# Patient Record
Sex: FEMALE | Race: WHITE | NOT HISPANIC OR LATINO | Employment: FULL TIME | URBAN - METROPOLITAN AREA
[De-identification: names, ages, dates, MRNs, and addresses within clinical notes are randomized per-mention and may not be internally consistent; named-entity substitution may affect disease eponyms.]

---

## 2017-02-22 ENCOUNTER — TELEPHONE (OUTPATIENT)
Dept: FAMILY MEDICINE | Facility: OTHER | Age: 47
End: 2017-02-22

## 2017-02-22 NOTE — TELEPHONE ENCOUNTER
Reason for call:  Medication  Reason for Call:  Medication or medication refill:    Do you use a [facility]?  Name of the pharmacy and phone number for the current request:  [facility] .[phone]    Name of the medication requested: change in medication for hypertension due to side effect of dry mouth    Other request: states has discussed this with Dr Paz in the past    Can we leave a detailed message on this number? YES    Phone number patient can be reached at: Cell number on file:    Telephone Information:   Mobile [phone]       Best Time: aware Dr Paz out until Friday 2/24    Call taken on 2/22/2017 at 2:54 PM by Jailyn Valencia

## 2017-02-22 NOTE — TELEPHONE ENCOUNTER
Spoke to pt to get clarification of message below, pt states that current BP med, chlorthalidone, is making her mouth dry, wondering if her BP medication can be changed to something else. Says she does not have any saliva and she is exercising and still no saliva. Pt states she does not want to come to clinic if required for a visit as she does not want to get sick. Will route to TC for review. Please advise.

## 2017-02-28 ENCOUNTER — VIRTUAL VISIT (OUTPATIENT)
Dept: FAMILY MEDICINE | Facility: OTHER | Age: 47
End: 2017-02-28
Payer: COMMERCIAL

## 2017-02-28 DIAGNOSIS — I10 HYPERTENSION GOAL BP (BLOOD PRESSURE) < 140/90: Primary | ICD-10-CM

## 2017-02-28 PROCEDURE — 99441 ZZC PHYSICIAN TELEPHONE EVALUATION 5-10 MIN: CPT | Performed by: FAMILY MEDICINE

## 2017-02-28 RX ORDER — METOPROLOL SUCCINATE 25 MG/1
25 TABLET, EXTENDED RELEASE ORAL DAILY
Qty: 90 TABLET | Refills: 3 | Status: SHIPPED | OUTPATIENT
Start: 2017-02-28 | End: 2017-04-25

## 2017-02-28 ASSESSMENT — ANXIETY QUESTIONNAIRES
7. FEELING AFRAID AS IF SOMETHING AWFUL MIGHT HAPPEN: NOT AT ALL
2. NOT BEING ABLE TO STOP OR CONTROL WORRYING: NOT AT ALL
5. BEING SO RESTLESS THAT IT IS HARD TO SIT STILL: NOT AT ALL
GAD7 TOTAL SCORE: 0
6. BECOMING EASILY ANNOYED OR IRRITABLE: NOT AT ALL
3. WORRYING TOO MUCH ABOUT DIFFERENT THINGS: NOT AT ALL
1. FEELING NERVOUS, ANXIOUS, OR ON EDGE: NOT AT ALL

## 2017-02-28 ASSESSMENT — PATIENT HEALTH QUESTIONNAIRE - PHQ9: 5. POOR APPETITE OR OVEREATING: NOT AT ALL

## 2017-02-28 NOTE — PROGRESS NOTES
"Hannah Krishna is a 47 year old female who is being evaluated via a telephone visit.      The patient has been notified of following:     \"This telephone visit will be conducted via a call between you and your physician/provider. We have found that certain health care needs can be provided without the need for a physical exam.  This service lets us provide the care you need with a short phone conversation.  If a prescription is necessary we can send it directly to your pharmacy.  If lab work is needed we can place an order for that and you can then stop by our lab to have the test done at a later time.    We will bill your insurance company for this service.  Please check with your medical insurance if this type of visit is covered. You may be responsible for the cost of this type of visit if insurance coverage is denied.  The typical cost is $30 (10min), $59 (11-20min) and $85 (21-30min).  Most often these visits are shorter than 10 minutes.    If during the course of the call the physician/provider feels a telephone visit is not appropriate, you will not be charged for this service.\"       Consent has been obtained for this service by 2 care team members: yes. See the scanned image in the medical record.    Hannah Krishna complains of  Hypertension      I have reviewed and updated the patient's Past Medical History, Social History, Family History and Medication List.    ALLERGIES  No known drug allergies    Kim Juan CMA   (MA signature)    Additional provider notes: Patient wants to change BP medications secondary to dry mouth.  Can't keep saliva in mouth, especially when working out.  Had been on metoprolol in the past, had stopped it as her BP was doing well, but then her BP increased off the medication.    Assessment/Plan:  1. Hypertension goal BP (blood pressure) < 140/90  Stop the chlorthalidone, restart metoprolol.  Have BP checked in pharmacy in the next 1-2 weeks.    - metoprolol (TOPROL-XL) 25 MG 24 " hr tablet; Take 1 tablet (25 mg) by mouth daily  Dispense: 90 tablet; Refill: 3     I have reviewed the note as documented above.  This accurately captures the substance of my conversation with the patient,  Electronically signed by Merlene Paz MD on 2/28/2017      Total time of call between patient and provider was 5 minutes

## 2017-02-28 NOTE — MR AVS SNAPSHOT
After Visit Summary   2/28/2017    Hannah Krishna    MRN: [id number]           Patient Information     Date Of Birth          1970        Visit Information        Provider Department      2/28/2017 11:20 AM Merlene Paz MD [facility]        Today's Diagnoses     Hypertension goal BP (blood pressure) < 140/90    -  1       Follow-ups after your visit        Your next 10 appointments already scheduled     Feb 28, 2017 11:20 AM CST   Telephone Visit with Merlene Paz MD   [facility]    [address]  [facility] [place] [address] [phone]           Note: this is not an onsite visit; there is no need to come to the facility.              Who to contact     If you have questions or need follow up information about today's clinic visit or your schedule please contact [facility] directly at [phone].  Normal or non-critical lab and imaging results will be communicated to you by Outernethart, letter or phone within 4 business days after the clinic has received the results. If you do not hear from us within 7 days, please contact the clinic through Outernethart or phone. If you have a critical or abnormal lab result, we will notify you by phone as soon as possible.  Submit refill requests through BioProtect or call your pharmacy and they will forward the refill request to us. Please allow 3 business days for your refill to be completed.          Additional Information About Your Visit        MyChart Information     BioProtect gives you secure access to your electronic health record. If you see a primary care provider, you can also send messages to your care team and make appointments. If you have questions, please call your primary care clinic.  If you do not have a primary care provider, please call [phone] and they will assist you.        Care EveryWhere ID     This is your Care EveryWhere ID. This could be  used by other organizations to access your [place] medical records  [id number]         Blood Pressure from Last 3 Encounters:   11/25/16 126/82   09/06/16 112/70   07/08/16 120/90    Weight from Last 3 Encounters:   11/25/16 230 lb (104.3 kg)   09/06/16 231 lb (104.8 kg)   08/22/16 230 lb (104.3 kg)              Today, you had the following     No orders found for display         Today's Medication Changes          These changes are accurate as of: 2/28/17 10:27 AM.  If you have any questions, ask your nurse or doctor.               Start taking these medicines.        Dose/Directions    metoprolol 25 MG 24 hr tablet   Commonly known as:  TOPROL-XL   Used for:  Hypertension goal BP (blood pressure) < 140/90   Started by:  Merlene Paz MD        Dose:  25 mg   Take 1 tablet (25 mg) by mouth daily   Quantity:  90 tablet   Refills:  3         Stop taking these medicines if you haven't already. Please contact your care team if you have questions.     chlorthalidone 25 MG tablet   Commonly known as:  HYGROTON   Stopped by:  Merlene Paz MD                Where to get your medicines      These medications were sent to [facility] [address]  [address] [address]     Phone:  [phone]     metoprolol 25 MG 24 hr tablet                Primary Care Provider Office Phone # Fax #    Merlene Paz [phone]       [facility] [address]  [facility] [address]        Thank you!     Thank you for choosing [facility]  for your care. Our goal is always to provide you with excellent care. Hearing back from our patients is one way we can continue to improve our services. Please take a few minutes to complete the written survey that you may receive in the mail after your visit with us. Thank you!             Your Updated Medication List - Protect others around you: Learn how to safely use, store and  throw away your medicines at www.disposemymeds.org.          This list is accurate as of: 2/28/17 10:27 AM.  Always use your most recent med list.                   Brand Name Dispense Instructions for use    buPROPion 300 MG 24 hr tablet    WELLBUTRIN XL    90 tablet    Take 1 tablet (300 mg) by mouth every morning       escitalopram 10 MG tablet    LEXAPRO    90 tablet    Take 1 tablet (10 mg) by mouth daily       FISH OIL PO      Reported on 2/28/2017       metoprolol 25 MG 24 hr tablet    TOPROL-XL    90 tablet    Take 1 tablet (25 mg) by mouth daily       order for DME     2 Device    Equipment being ordered: bilateral wrist splints       VITAMIN B-12 CR PO      Reported on 2/28/2017       VITAMIN B-6 PO      Reported on 2/28/2017

## 2017-03-01 ASSESSMENT — PATIENT HEALTH QUESTIONNAIRE - PHQ9: SUM OF ALL RESPONSES TO PHQ QUESTIONS 1-9: 0

## 2017-03-01 ASSESSMENT — ANXIETY QUESTIONNAIRES: GAD7 TOTAL SCORE: 0

## 2017-03-15 ENCOUNTER — ALLIED HEALTH/NURSE VISIT (OUTPATIENT)
Dept: FAMILY MEDICINE | Facility: OTHER | Age: 47
End: 2017-03-15
Payer: COMMERCIAL

## 2017-03-15 VITALS — SYSTOLIC BLOOD PRESSURE: 118 MMHG | DIASTOLIC BLOOD PRESSURE: 70 MMHG | HEART RATE: 60 BPM

## 2017-03-15 DIAGNOSIS — I10 HYPERTENSION GOAL BP (BLOOD PRESSURE) < 140/90: Primary | ICD-10-CM

## 2017-03-15 PROCEDURE — 99207 ZZC NO CHARGE NURSE ONLY: CPT | Performed by: FAMILY MEDICINE

## 2017-03-15 NOTE — PROGRESS NOTES
Hannah Krishna is enrolled/participating in the retail pharmacy Blood Pressure Goals Achievement Program (BPGAP).  Hannah Krishna was evaluated at [facility] on March 15, 2017 at which time her blood pressure was:    BP Readings from Last 3 Encounters:   03/15/17 118/70   11/25/16 126/82   09/06/16 112/70       Hannah Krishna is not experiencing symptoms.    Follow-Up: BP is at goal of < 140/90mmHg (patient 18+ years of age with or without diabetes).     Completed by:  Alannah Haywood, Pharm.D., [facility], [phone]

## 2017-03-15 NOTE — MR AVS SNAPSHOT
After Visit Summary   3/15/2017    Hannah Krishna    MRN: [id number]           Patient Information     Date Of Birth          1970        Visit Information        Provider Department      3/15/2017 3:43 PM Merlene Pza MD [facility]        Today's Diagnoses     Hypertension goal BP (blood pressure) < 140/90    -  1       Follow-ups after your visit        Who to contact     If you have questions or need follow up information about today's clinic visit or your schedule please contact [facility] directly at [phone].  Normal or non-critical lab and imaging results will be communicated to you by Retargetlyhart, letter or phone within 4 business days after the clinic has received the results. If you do not hear from us within 7 days, please contact the clinic through Retargetlyhart or phone. If you have a critical or abnormal lab result, we will notify you by phone as soon as possible.  Submit refill requests through Valeritas or call your pharmacy and they will forward the refill request to us. Please allow 3 business days for your refill to be completed.          Additional Information About Your Visit        MyChart Information     Valeritas gives you secure access to your electronic health record. If you see a primary care provider, you can also send messages to your care team and make appointments. If you have questions, please call your primary care clinic.  If you do not have a primary care provider, please call [phone] and they will assist you.        Care EveryWhere ID     This is your Care EveryWhere ID. This could be used by other organizations to access your [place] medical records  [id number]        Your Vitals Were     Pulse                   60            Blood Pressure from Last 3 Encounters:   03/15/17 118/70   11/25/16 126/82   09/06/16 112/70    Weight from Last 3 Encounters:   11/25/16 230 lb (104.3 kg)   09/06/16 231 lb (104.8 kg)   08/22/16 230  lb (104.3 kg)              Today, you had the following     No orders found for display       Primary Care Provider Office Phone # Fax #    Merlene GANNON MD [place] [phone]       [facility] [address]  [place] [place] [facility] [address]        Thank you!     Thank you for choosing [facility]  for your care. Our goal is always to provide you with excellent care. Hearing back from our patients is one way we can continue to improve our services. Please take a few minutes to complete the written survey that you may receive in the mail after your visit with us. Thank you!             Your Updated Medication List - Protect others around you: Learn how to safely use, store and throw away your medicines at www.disposemymeds.org.          This list is accurate as of: 3/15/17  3:56 PM.  Always use your most recent med list.                   Brand Name Dispense Instructions for use    buPROPion 300 MG 24 hr tablet    WELLBUTRIN XL    90 tablet    Take 1 tablet (300 mg) by mouth every morning       escitalopram 10 MG tablet    LEXAPRO    90 tablet    Take 1 tablet (10 mg) by mouth daily       FISH OIL PO      Reported on 2/28/2017       metoprolol 25 MG 24 hr tablet    TOPROL-XL    90 tablet    Take 1 tablet (25 mg) by mouth daily       order for DME     2 Device    Equipment being ordered: bilateral wrist splints       VITAMIN B-12 CR PO      Reported on 2/28/2017       VITAMIN B-6 PO      Reported on 2/28/2017

## 2017-04-20 NOTE — PROGRESS NOTES
SUBJECTIVE:                                                    Hannah Krishna is a 47 year old female who presents to clinic today for the following health issues:    History of Present Illness   Hypertension:     Outpatient blood pressures:  Are being checked    Blood pressures checked at:  Home    Dietary sodium intake::  No added salt diet    Has lost 20# in the last 5 months, working out at [employer]  Works out 6 days a week.      Started metoprolol after her virtual visit, but couldn't get her target heart rate, so she stopped it after 4 days.  Had BP checked in March off her medication and it was good.      Problem list and histories reviewed & adjusted, as indicated.  Additional history: as documented    Patient Active Problem List   Diagnosis     Obesity     Polycystic ovaries     Endometriosis of uterus     Hyperlipidemia, unspecified     Major depressive disorder, recurrent episode, moderate (H)     Hypertension goal BP (blood pressure) < 140/90     Anxiety     Esophageal reflux     Lateral epicondylitis     Lesion of left ulnar nerve     Complete tear or right rotator cuff     Hair loss     Past Surgical History:   Procedure Laterality Date     ARTHROSCOPY SHOULDER BICEPS TENODESIS REPAIR Right 4/8/2016    Procedure: ARTHROSCOPY SHOULDER BICEPS TENODESIS REPAIR;  Surgeon: Gilbert Cortes DO;  Location: [facility]     ARTHROSCOPY SHOULDER DECOMPRESSION Right 4/8/2016    Procedure: ARTHROSCOPY SHOULDER DECOMPRESSION;  Surgeon: Gilbert Cortes DO;  Location: [facility]     ARTHROSCOPY SHOULDER DISTAL CLAVICLE REPAIR Right 4/8/2016    Procedure: ARTHROSCOPY SHOULDER DISTAL CLAVICLE RESECTION;  Surgeon: Gilbert Cortes DO;  Location: [facility] OR     ARTHROSCOPY SHOULDER, OPEN ROTATOR CUFF REPAIR, COMBINED Right 4/8/2016    Procedure: COMBINED ARTHROSCOPY SHOULDER, OPEN ROTATOR CUFF REPAIR;  Surgeon: Gilbert Cortes DO;  Location: [facility]     BUNIONECTOMY  10/24/2013    Procedure:  "BUNIONECTOMY;;  Surgeon: Ariel Mcdermott DPM;  Location: [facility] OR     C LIGATE FALLOPIAN TUBE,POSTPARTUM  1998    Tubal Ligation     C NONSPECIFIC PROCEDURE  2002    kidney stones removed     C NONSPECIFIC PROCEDURE  060503    lasik surgery     C STRESS ECHO TEST NL  11/2005    neg (low probability of ischemic disease)     EXCISE EXOSTOSIS FOOT  10/24/2013    Procedure: EXCISE EXOSTOSIS FOOT;  left excision of accessory navicular, bunion repair with osteotomy, and navicular remodeling;  Surgeon: Ariel Mcdermott DPM;  Location: [facility] OR     EYE SURGERY       HC RAD RX IODINE 123 SODIUM IODIDE  MICC, UP  MICC  10/2005    I 123 thryoid -WNL     HEAD & NECK SURGERY      Why is this one highlighted on yes already??     OSTEOTOMY FOOT  10/24/2013    Procedure: OSTEOTOMY FOOT;;  Surgeon: Ariel Mcdermott DPM;  Location: [facility] OR       Social History   Substance Use Topics     Smoking status: Former Smoker     Packs/day: 0.10     Years: 18.00     Types: Cigarettes     Quit date: 11/7/2014     Smokeless tobacco: Never Used      Comment: since 2007     Alcohol use Yes      Comment: weekends <2-4 drinks     Family History   Problem Relation Age of Onset     Adopted: Yes     Unknown/Adopted Mother      Unknown/Adopted Father      Unknown/Adopted Maternal Grandmother      Unknown/Adopted Maternal Grandfather      Unknown/Adopted Paternal Grandmother      Unknown/Adopted Paternal Grandfather            ROS:  C: NEGATIVE for fever, chills  E/M: NEGATIVE for ear, mouth and throat problems  R: NEGATIVE for significant cough or SOB  CV: NEGATIVE for chest pain, palpitations or peripheral edema    OBJECTIVE:                                                    /74  Pulse 57  Temp 98.7  F (37.1  C) (Temporal)  Resp 12  Ht 5' 3.19\" (1.605 m)  Wt 212 lb (96.2 kg)  SpO2 99%  BMI 37.33 kg/m2  Body mass index is 37.33 kg/(m^2).  Gen: no apparent distress  Chest: clear to auscultation without wheeze, rale or " "rhonchi  Cor: regular rate and rhythm without murmur  Ext: warm and dry without edema  Psych: Alert and oriented times 3; coherent speech, normal   rate and volume, able to articulate logical thoughts, able   to abstract reason, no tangential thoughts, no hallucinations   or delusions  Her affect is bright     ASSESSMENT/PLAN:                                                      BMI:   Estimated body mass index is 37.33 kg/(m^2) as calculated from the following:    Height as of this encounter: 5' 3.19\" (1.605 m).    Weight as of this encounter: 212 lb (96.2 kg).   Weight management plan: Continue [facility]      1. Non morbid obesity, unspecified obesity type  Her hypertension has resolved with her weight loss, OK to stay off medications.  Congratulated her on weight loss so far, she is motivated to continue.    2. Major depressive disorder, recurrent episode, moderate (H)  Discussed her mood, it is great, discussed decreasing medication, she is not ready for that currently, she is concerned about recurrent symptoms and wants to continue to do well, will continue medication without change.    Merlene Paz MD  [facility]  "

## 2017-04-25 ENCOUNTER — OFFICE VISIT (OUTPATIENT)
Dept: FAMILY MEDICINE | Facility: OTHER | Age: 47
End: 2017-04-25
Payer: COMMERCIAL

## 2017-04-25 VITALS
RESPIRATION RATE: 12 BRPM | TEMPERATURE: 98.7 F | HEART RATE: 57 BPM | HEIGHT: 63 IN | DIASTOLIC BLOOD PRESSURE: 74 MMHG | WEIGHT: 212 LBS | OXYGEN SATURATION: 99 % | BODY MASS INDEX: 37.56 KG/M2 | SYSTOLIC BLOOD PRESSURE: 122 MMHG

## 2017-04-25 DIAGNOSIS — F33.1 MAJOR DEPRESSIVE DISORDER, RECURRENT EPISODE, MODERATE (H): ICD-10-CM

## 2017-04-25 DIAGNOSIS — E66.9 NON MORBID OBESITY, UNSPECIFIED OBESITY TYPE: Primary | ICD-10-CM

## 2017-04-25 PROCEDURE — 99213 OFFICE O/P EST LOW 20 MIN: CPT | Performed by: FAMILY MEDICINE

## 2017-04-25 ASSESSMENT — ANXIETY QUESTIONNAIRES
7. FEELING AFRAID AS IF SOMETHING AWFUL MIGHT HAPPEN: 0 = NOT AT ALL
GAD7 TOTAL SCORE: 1

## 2017-04-25 ASSESSMENT — PAIN SCALES - GENERAL: PAINLEVEL: NO PAIN (0)

## 2017-04-25 NOTE — NURSING NOTE
"Chief Complaint   Patient presents with     Hypertension     Hyperlipidemia     Panel Management     ht       Initial /74  Pulse 57  Temp 98.7  F (37.1  C) (Temporal)  Resp 12  Ht 5' 3.19\" (1.605 m)  Wt 212 lb (96.2 kg)  SpO2 99%  BMI 37.33 kg/m2 Estimated body mass index is 37.33 kg/(m^2) as calculated from the following:    Height as of this encounter: 5' 3.19\" (1.605 m).    Weight as of this encounter: 212 lb (96.2 kg).  Medication Reconciliation: complete  Lucia Abdalla CMA (AAMA)    "

## 2017-04-25 NOTE — MR AVS SNAPSHOT
"              After Visit Summary   4/25/2017    Hannah Krishna    MRN: [id number]           Patient Information     Date Of Birth          1970        Visit Information        Provider Department      4/25/2017 1:00 PM Merlene Paz MD [facility]         Follow-ups after your visit        Who to contact     If you have questions or need follow up information about today's clinic visit or your schedule please contact [facility] directly at [phone].  Normal or non-critical lab and imaging results will be communicated to you by CollegeWikishart, letter or phone within 4 business days after the clinic has received the results. If you do not hear from us within 7 days, please contact the clinic through CollegeWikishart or phone. If you have a critical or abnormal lab result, we will notify you by phone as soon as possible.  Submit refill requests through ShowClix or call your pharmacy and they will forward the refill request to us. Please allow 3 business days for your refill to be completed.          Additional Information About Your Visit        MyChart Information     ShowClix gives you secure access to your electronic health record. If you see a primary care provider, you can also send messages to your care team and make appointments. If you have questions, please call your primary care clinic.  If you do not have a primary care provider, please call [phone] and they will assist you.        Care EveryWhere ID     This is your Care EveryWhere ID. This could be used by other organizations to access your [place] medical records  [id number]        Your Vitals Were     Pulse Temperature Respirations Height Pulse Oximetry BMI (Body Mass Index)    57 98.7  F (37.1  C) (Temporal) 12 5' 3.19\" (1.605 m) 99% 37.33 kg/m2       Blood Pressure from Last 3 Encounters:   04/25/17 122/74   03/15/17 118/70   11/25/16 126/82    Weight from Last 3 Encounters:   04/25/17 212 lb (96.2 kg)   11/25/16 " 230 lb (104.3 kg)   09/06/16 231 lb (104.8 kg)              Today, you had the following     No orders found for display         Today's Medication Changes          These changes are accurate as of: 4/25/17  1:10 PM.  If you have any questions, ask your nurse or doctor.               Stop taking these medicines if you haven't already. Please contact your care team if you have questions.     metoprolol 25 MG 24 hr tablet   Commonly known as:  TOPROL-XL   Stopped by:  Merlene Paz MD                    Primary Care Provider Office Phone # Fax #    Merlene Paz [phone]       [facility] [address]  [facility] [address]        Thank you!     Thank you for choosing [facility]  for your care. Our goal is always to provide you with excellent care. Hearing back from our patients is one way we can continue to improve our services. Please take a few minutes to complete the written survey that you may receive in the mail after your visit with us. Thank you!             Your Updated Medication List - Protect others around you: Learn how to safely use, store and throw away your medicines at www.disposemymeds.org.          This list is accurate as of: 4/25/17  1:10 PM.  Always use your most recent med list.                   Brand Name Dispense Instructions for use    buPROPion 300 MG 24 hr tablet    WELLBUTRIN XL    90 tablet    Take 1 tablet (300 mg) by mouth every morning       escitalopram 10 MG tablet    LEXAPRO    90 tablet    Take 1 tablet (10 mg) by mouth daily       FISH OIL PO      Reported on 4/25/2017       VITAMIN B-12 CR PO      Reported on 4/25/2017       VITAMIN B-6 PO      Reported on 4/25/2017

## 2017-06-29 NOTE — PROGRESS NOTES
[address]  [facility] [address]  [phone]  Dept: [phone]    PRE-OP EVALUATION:  Today's date: 2017    Answers for HPI/ROS submitted by the patient on 2017   CHITRA 7 TOTAL SCORE: 0  If you checked off any problems, how difficult have these problems made it for you to do your work, take care of things at home, or get along with other people?: Not difficult at all  PHQ9 TOTAL SCORE: 0    Hannah Krishna (: 1970) presents for pre-operative evaluation assessment as requested by Dr. Lara.  She requires evaluation and anesthesia risk assessment prior to undergoing surgery/procedure for treatment of Left Rotator Cuff .  Proposed procedure: Left Rotator Cuff    Date of Surgery/ Procedure: 7/10/17  Time of Surgery/ Procedure: 10:00 am  Hospital/Surgical Facility: [facility]  Fax number for surgical facility: [phone]  Primary Physician: Merlene Paz  Type of Anesthesia Anticipated: General    Patient has a Health Care Directive or Living Will:  NO    Preop Questions 2017   1.  Do you have a history of heart attack, stroke, stent, bypass or surgery on an artery in the head, neck, heart or legs? No   2.  Do you ever have any pain or discomfort in your chest? No   3.  Do you have a history of  Heart Failure? No   4.   Are you troubled by shortness of breath when:  walking on a level surface, or up a slight hill, or at night? No   5.  Do you currently have a cold, bronchitis or other respiratory infection? No   6.  Do you have a cough, shortness of breath, or wheezing? No   7.  Do you sometimes get pains in the calves of your legs when you walk? No   8. Do you or anyone in your family have previous history of blood clots? No   9.  Do you or does anyone in your family have a serious bleeding problem such as prolonged bleeding following surgeries or cuts? No   10. Have you ever had problems with anemia or been  told to take iron pills? No   11. Have you had any abnormal blood loss such as black, tarry or bloody stools, or abnormal vaginal bleeding? No   12. Have you ever had a blood transfusion? No   13. Have you or any of your relatives ever had problems with anesthesia? No   14. Do you have sleep apnea, excessive snoring or daytime drowsiness? No   15. Do you have any prosthetic heart valves? No   16. Do you have prosthetic joints? No   17. Is there any chance that you may be pregnant? No       HPI:                                                      Brief HPI related to upcoming procedure: left shoulder pain worsening for many months      See problem list for active medical problems.  Problems all longstanding and stable, except as noted/documented.  See ROS for pertinent symptoms related to these conditions.                                                                                                  .    MEDICAL HISTORY:                                                      Patient Active Problem List    Diagnosis Date Noted     Esophageal reflux 12/11/2013     Priority: Medium     Anxiety 07/31/2013     Priority: Medium     Major depressive disorder, recurrent episode, moderate (H) 12/12/2011     Priority: Medium     Hyperlipidemia, unspecified 10/31/2010     Priority: Medium     Diagnosis updated by automated process. Provider to review and confirm.       Endometriosis of uterus 08/25/2003     Priority: Medium     Polycystic ovaries 04/08/2003     Priority: Medium     Obesity 04/11/2001     Priority: Medium     Problem list name updated by automated process. Provider to review        Past Medical History:   Diagnosis Date     Chest pain, unspecified 11/2005    atypical - stress echo neg     Chronic depressive personality disorder      Headache 7/31/2013     Hypertension      Lateral epicondylitis 7/28/2015     Malaise and fatigue 8/1/2005     Obesity, unspecified      Other malaise and fatigue 8/2005      Polycystic ovaries     per pt     Sleep disturbance, unspecified 10/2005    see sleep study     Unspecified disorder of thyroid 9/2005    thyroid nodule. I-123 WNL.     Past Surgical History:   Procedure Laterality Date     ARTHROSCOPY SHOULDER BICEPS TENODESIS REPAIR Right 4/8/2016    Procedure: ARTHROSCOPY SHOULDER BICEPS TENODESIS REPAIR;  Surgeon: Gilbert Cortes DO;  [facility]     ARTHROSCOPY SHOULDER DECOMPRESSION Right 4/8/2016    Procedure: ARTHROSCOPY SHOULDER DECOMPRESSION;  Surgeon: Gilbert Cortes DO;  [facility]     ARTHROSCOPY SHOULDER DISTAL CLAVICLE REPAIR Right 4/8/2016    Procedure: ARTHROSCOPY SHOULDER DISTAL CLAVICLE RESECTION;  Surgeon: Gilbert Cortes DO;  Location[facility]     ARTHROSCOPY SHOULDER, OPEN ROTATOR CUFF REPAIR, COMBINED Right 4/8/2016    Procedure: COMBINED ARTHROSCOPY SHOULDER, OPEN ROTATOR CUFF REPAIR;  Surgeon: Gilbert Cortes DO;  [facility]     BUNIONECTOMY  10/24/2013    Procedure: BUNIONECTOMY;;  Surgeon: Ariel Mcdermott DPM;  Location: [facility]     C LIGATE FALLOPIAN TUBE,POSTPARTUM  1998    Tubal Ligation     C NONSPECIFIC PROCEDURE  2002    kidney stones removed     C NONSPECIFIC PROCEDURE  060503    lasik surgery     C STRESS ECHO TEST NL  11/2005    neg (low probability of ischemic disease)     EXCISE EXOSTOSIS FOOT  10/24/2013    Procedure: EXCISE EXOSTOSIS FOOT;  left excision of accessory navicular, bunion repair with osteotomy, and navicular remodeling;  Surgeon: Ariel Mcdermott DPM;  Location: [facility]     EYE SURGERY       HC RAD RX IODINE 123 SODIUM IODIDE  MICC, UP  MICC  10/2005    I 123 thryoid -WNL     HEAD & NECK SURGERY      Why is this one highlighted on yes already??     OSTEOTOMY FOOT  10/24/2013    Procedure: OSTEOTOMY FOOT;;  Surgeon: Ariel Mcdermott DPM;  Location: [facility]     Current Outpatient Prescriptions   Medication Sig Dispense Refill     buPROPion (WELLBUTRIN XL) 300 MG  "24 hr tablet Take 1 tablet (300 mg) by mouth every morning 90 tablet 3     escitalopram (LEXAPRO) 10 MG tablet Take 1 tablet (10 mg) by mouth daily 90 tablet 3     OTC products: None, except as noted above    Allergies   Allergen Reactions     No Known Drug Allergies       Latex Allergy: NO    Social History   Substance Use Topics     Smoking status: Former Smoker     Packs/day: 0.10     Years: 18.00     Types: Cigarettes     Quit date: 11/7/2014     Smokeless tobacco: Never Used      Comment: since 2007     Alcohol use Yes      Comment: weekends <2-4 drinks     History   Drug Use No        REVIEW OF SYSTEMS:                                                    C: NEGATIVE for fever, chills, change in weight  I: NEGATIVE for worrisome rashes, moles or lesions  E: NEGATIVE for vision changes or irritation  E/M: NEGATIVE for ear, mouth and throat problems  R: NEGATIVE for significant cough or SOB  CV: NEGATIVE for chest pain, palpitations or peripheral edema  GI: NEGATIVE for nausea, abdominal pain, heartburn, or change in bowel habits  : NEGATIVE for frequency, dysuria, or hematuria  MUSCULOSKELETAL: left shoulder pain  N: NEGATIVE for weakness, dizziness or paresthesias  E: NEGATIVE for temperature intolerance, skin/hair changes  H: NEGATIVE for bleeding problems  P: NEGATIVE for changes in mood or affect    EXAM:                                                    /72 (BP Location: Left arm, Patient Position: Chair, Cuff Size: Adult Regular)  Pulse 68  Temp 97.6  F (36.4  C) (Oral)  Resp 16  Ht 5' 3\" (1.6 m)  Wt 205 lb (93 kg)  SpO2 100%  BMI 36.31 kg/m2    GENERAL APPEARANCE: healthy, alert and no distress     EYES: EOMI, PERRL     HENT: ear canals and TM's normal and nose and mouth without ulcers or lesions     NECK: no adenopathy, no asymmetry, masses, or scars and thyroid normal to palpation     RESP: lungs clear to auscultation - no rales, rhonchi or wheezes     CV: regular rates and rhythm, normal " S1 S2, no S3 or S4 and no murmur, click or rub     ABDOMEN:  soft, nontender, no HSM or masses and bowel sounds normal     MS: decreased range of motion left shoulder     SKIN: no suspicious lesions or rashes     NEURO: Normal strength and tone, sensory exam grossly normal, mentation intact and speech normal     PSYCH: mentation appears normal. and affect normal/bright     LYMPHATICS: No axillary, cervical, or supraclavicular nodes    DIAGNOSTICS:                                                    No labs or EKG required     Recent Labs   Lab Test  07/08/16   0953  08/28/15   0750  06/02/14   0857   HGB  13.5   --   13.7   PLT  352   --   369   NA  138  138   --    POTASSIUM  4.4  4.1   --    CR  0.86  0.98   --         IMPRESSION:                                                    Reason for surgery/procedure: left rotator cuff syndrome    The proposed surgical procedure is considered INTERMEDIATE risk.    REVISED CARDIAC RISK INDEX  The patient has the following serious cardiovascular risks for perioperative complications such as (MI, PE, VFib and 3  AV Block):  No serious cardiac risks  INTERPRETATION: 0 risks: Class I (very low risk - 0.4% complication rate)    The patient has the following additional risks for perioperative complications:  No identified additional risks      ICD-10-CM    1. Preop general physical exam Z01.818    2. Tear of left rotator cuff, unspecified tear extent M75.102        RECOMMENDATIONS:                                                      --Patient is to take all scheduled medications on the day of surgery (she plans to take her pills after she returns home)    APPROVAL GIVEN to proceed with proposed procedure, without further diagnostic evaluation       Signed Electronically by: Merlene Paz MD    Copy of this evaluation report is provided to requesting physician.    [facility] Preop Guidelines

## 2017-07-07 ENCOUNTER — OFFICE VISIT (OUTPATIENT)
Dept: FAMILY MEDICINE | Facility: OTHER | Age: 47
End: 2017-07-07
Payer: COMMERCIAL

## 2017-07-07 VITALS
HEART RATE: 68 BPM | SYSTOLIC BLOOD PRESSURE: 124 MMHG | WEIGHT: 205 LBS | DIASTOLIC BLOOD PRESSURE: 72 MMHG | BODY MASS INDEX: 36.32 KG/M2 | RESPIRATION RATE: 16 BRPM | OXYGEN SATURATION: 100 % | HEIGHT: 63 IN | TEMPERATURE: 97.6 F

## 2017-07-07 DIAGNOSIS — Z01.818 PREOP GENERAL PHYSICAL EXAM: Primary | ICD-10-CM

## 2017-07-07 DIAGNOSIS — M75.102 TEAR OF LEFT ROTATOR CUFF, UNSPECIFIED TEAR EXTENT: ICD-10-CM

## 2017-07-07 PROCEDURE — 99214 OFFICE O/P EST MOD 30 MIN: CPT | Performed by: FAMILY MEDICINE

## 2017-07-07 ASSESSMENT — PATIENT HEALTH QUESTIONNAIRE - PHQ9
10. IF YOU CHECKED OFF ANY PROBLEMS, HOW DIFFICULT HAVE THESE PROBLEMS MADE IT FOR YOU TO DO YOUR WORK, TAKE CARE OF THINGS AT HOME, OR GET ALONG WITH OTHER PEOPLE: NOT DIFFICULT AT ALL
SUM OF ALL RESPONSES TO PHQ QUESTIONS 1-9: 0
SUM OF ALL RESPONSES TO PHQ QUESTIONS 1-9: 0

## 2017-07-07 ASSESSMENT — ANXIETY QUESTIONNAIRES
GAD7 TOTAL SCORE: 0
4. TROUBLE RELAXING: NOT AT ALL
GAD7 TOTAL SCORE: 0
7. FEELING AFRAID AS IF SOMETHING AWFUL MIGHT HAPPEN: NOT AT ALL
2. NOT BEING ABLE TO STOP OR CONTROL WORRYING: NOT AT ALL
1. FEELING NERVOUS, ANXIOUS, OR ON EDGE: NOT AT ALL
5. BEING SO RESTLESS THAT IT IS HARD TO SIT STILL: NOT AT ALL
GAD7 TOTAL SCORE: 0
6. BECOMING EASILY ANNOYED OR IRRITABLE: NOT AT ALL
7. FEELING AFRAID AS IF SOMETHING AWFUL MIGHT HAPPEN: NOT AT ALL
3. WORRYING TOO MUCH ABOUT DIFFERENT THINGS: NOT AT ALL

## 2017-07-07 ASSESSMENT — PAIN SCALES - GENERAL: PAINLEVEL: NO PAIN (0)

## 2017-07-07 NOTE — MR AVS SNAPSHOT
After Visit Summary   7/7/2017    Hannah Krishna    MRN: [id number]           Patient Information     Date Of Birth          1970        Visit Information        Provider Department      7/7/2017 11:00 AM Merlene Paz MD [facility]        Today's Diagnoses     Preop general physical exam    -  1    Tear of left rotator cuff, unspecified tear extent          Care Instructions      Before Your Surgery      Call your surgeon if there is any change in your health. This includes signs of a cold or flu (such as a sore throat, runny nose, cough, rash or fever).    Do not smoke, drink alcohol or take over the counter medicine (unless your surgeon or primary care doctor tells you to) for the 24 hours before and after surgery.    If you take prescribed drugs: Follow your doctor s orders about which medicines to take and which to stop until after surgery.    Eating and drinking prior to surgery: follow the instructions from your surgeon    Take a shower or bath the night before surgery. Use the soap your surgeon gave you to gently clean your skin. If you do not have soap from your surgeon, use your regular soap. Do not shave or scrub the surgery site.  Wear clean pajamas and have clean sheets on your bed.           Follow-ups after your visit        Who to contact     If you have questions or need follow up information about today's clinic visit or your schedule please contact [facility] directly at [phone].  Normal or non-critical lab and imaging results will be communicated to you by MyChart, letter or phone within 4 business days after the clinic has received the results. If you do not hear from us within 7 days, please contact the clinic through [employer] or phone. If you have a critical or abnormal lab result, we will notify you by phone as soon as possible.  Submit refill requests through [employer] or call your pharmacy and they will forward the refill request  "to us. Please allow 3 business days for your refill to be completed.          Additional Information About Your Visit        MyChart Information     [employer] gives you secure access to your electronic health record. If you see a primary care provider, you can also send messages to your care team and make appointments. If you have questions, please call your primary care clinic.  If you do not have a primary care provider, please call [phone] and they will assist you.        Care EveryWhere ID     This is your Care EveryWhere ID. This could be used by other organizations to access your [facility] medical records  [id number]        Your Vitals Were     Pulse Temperature Respirations Height Pulse Oximetry BMI (Body Mass Index)    68 97.6  F (36.4  C) (Oral) 16 5' 3\" (1.6 m) 100% 36.31 kg/m2       Blood Pressure from Last 3 Encounters:   07/07/17 124/72   04/25/17 122/74   03/15/17 118/70    Weight from Last 3 Encounters:   07/07/17 205 lb (93 kg)   04/25/17 212 lb (96.2 kg)   11/25/16 230 lb (104.3 kg)              Today, you had the following     No orders found for display       Primary Care Provider Office Phone # Fax #    Merlene GANNON MD [facility] [phone]       [facility] [address]  [facility] [address]        Equal Access to Services     LONNY FARIAS : Hadii aad ku hadasho Soomaali, waaxda luqadaha, qaybta kaalmada kenrick, gurmeet brown . So [facility] [phone].    ATENCIÓN: Si habla español, tiene a manley disposición servicios gratuitos de asistencia lingüística. Eugenio al [phone].    We comply with applicable federal civil rights laws and [place] laws. We do not discriminate on the basis of race, color, national origin, age, disability sex, sexual orientation or gender identity.            Thank you!     Thank you for choosing [facility]  for your care. Our goal is always to provide you with excellent care. Hearing back from " our patients is one way we can continue to improve our services. Please take a few minutes to complete the written survey that you may receive in the mail after your visit with us. Thank you!             Your Updated Medication List - Protect others around you: Learn how to safely use, store and throw away your medicines at www.disposemymeds.org.          This list is accurate as of: 7/7/17 11:27 AM.  Always use your most recent med list.                   Brand Name Dispense Instructions for use Diagnosis    buPROPion 300 MG 24 hr tablet    WELLBUTRIN XL    90 tablet    Take 1 tablet (300 mg) by mouth every morning    Major depressive disorder, recurrent episode, moderate (H)       escitalopram 10 MG tablet    LEXAPRO    90 tablet    Take 1 tablet (10 mg) by mouth daily    Anxiety, Major depressive disorder, recurrent episode, moderate (H)

## 2017-07-07 NOTE — NURSING NOTE
"Chief Complaint   Patient presents with     Pre-Op Exam     Panel Management     lipid, dap, phq9/humberto, bmp       Initial /72 (BP Location: Left arm, Patient Position: Chair, Cuff Size: Adult Regular)  Pulse 68  Temp 97.6  F (36.4  C) (Oral)  Resp 16  Ht 5' 3\" (1.6 m)  Wt 205 lb (93 kg)  SpO2 100%  BMI 36.31 kg/m2 Estimated body mass index is 36.31 kg/(m^2) as calculated from the following:    Height as of this encounter: 5' 3\" (1.6 m).    Weight as of this encounter: 205 lb (93 kg).  Medication Reconciliation: complete   Migdalia Mccarthy CMA (AAMA)      "

## 2017-07-08 ASSESSMENT — ANXIETY QUESTIONNAIRES: GAD7 TOTAL SCORE: 0

## 2017-07-08 ASSESSMENT — PATIENT HEALTH QUESTIONNAIRE - PHQ9: SUM OF ALL RESPONSES TO PHQ QUESTIONS 1-9: 0

## 2017-12-04 DIAGNOSIS — F33.1 MAJOR DEPRESSIVE DISORDER, RECURRENT EPISODE, MODERATE (H): ICD-10-CM

## 2017-12-06 RX ORDER — BUPROPION HYDROCHLORIDE 300 MG/1
300 TABLET ORAL EVERY MORNING
Qty: 30 TABLET | Refills: 0 | Status: SHIPPED | OUTPATIENT
Start: 2017-12-06 | End: 2017-12-28

## 2017-12-06 NOTE — TELEPHONE ENCOUNTER
Routing refill request to provider for review/approval because:  Patient needs to be seen because:  Pt's last mood f/u was on 4/25/17.    Lucretia Odonnell RN

## 2017-12-28 DIAGNOSIS — F33.1 MAJOR DEPRESSIVE DISORDER, RECURRENT EPISODE, MODERATE (H): ICD-10-CM

## 2017-12-28 DIAGNOSIS — F41.9 ANXIETY: ICD-10-CM

## 2018-01-02 RX ORDER — BUPROPION HYDROCHLORIDE 300 MG/1
TABLET ORAL
Qty: 30 TABLET | Refills: 0 | Status: SHIPPED | OUTPATIENT
Start: 2018-01-02 | End: 2018-02-12

## 2018-01-02 RX ORDER — ESCITALOPRAM OXALATE 10 MG/1
TABLET ORAL
Qty: 30 TABLET | Refills: 0 | Status: SHIPPED | OUTPATIENT
Start: 2018-01-02 | End: 2018-02-09

## 2018-01-02 NOTE — TELEPHONE ENCOUNTER
Requested Prescriptions   Pending Prescriptions Disp Refills     buPROPion (WELLBUTRIN XL) 300 MG 24 hr tablet [Pharmacy Med Name: BUPROPION HCL ER (XL) 300MG TB24] 30 tablet 0     Sig: TAKE ONE TABLET BY MOUTH EVERY MORNING **DUE FOR FOLLOW UP    SSRIs Protocol Passed    12/28/2017 12:56 PM       Passed - PHQ-9 score less than 5 in past 6 months    The PHQ-9 criteria is meant to fail. It requires a PHQ-9 score review  PHQ-9 SCORE 2/28/2017 4/25/2017 7/7/2017   Total Score - - -   Total Score MyChart - 0 0   Total Score 0 - 0          Passed - Medication is Bupropion    If the medication is Bupropion (Wellbutrin), and the patient is taking for smoking cessation; OK to refill.         Passed - Patient is age 18 or older       Passed - No active pregnancy on record       Passed - No positive pregnancy test in last 12 months       Passed - Recent (6 mo) or future visit with authorizing provider's specialty    Patient had office visit in the last 6 months or has a visit in the next 30 days with authorizing provider.  See chart review.             buPROPion (WELLBUTRIN XL) 300 MG 24 hr tablet  Routing refill request to provider for review/approval because:  Jacque given x1 and patient did not follow up, please advise  Patient needs to be seen because:  Due for mood follow up (scheduled)  Due for updated PHQ-9    Next 5 appointments (look out 90 days)     Cj 15, 2018  8:00 AM CST   [facility]    [address] [facility] [address]  [facility] [address]   [phone]                Roseline Arias, RN, BSN

## 2018-01-02 NOTE — TELEPHONE ENCOUNTER
Refill given for additional one month will need ov for further refills due for labs, phq9 and pe.     Thank you  Cindy Thomas CNP

## 2018-01-02 NOTE — TELEPHONE ENCOUNTER
Requested Prescriptions   Pending Prescriptions Disp Refills     escitalopram (LEXAPRO) 10 MG tablet [Pharmacy Med Name: ESCITALOPRAM OXALATE 10MG TABS] 90 tablet 3     Sig: TAKE ONE TABLET BY MOUTH EVERY DAY    SSRIs Protocol Passed    12/28/2017 12:56 PM       Passed - PHQ-9 score less than 5 in past 6 months    The PHQ-9 criteria is meant to fail. It requires a PHQ-9 score review  PHQ-9 SCORE 2/28/2017 4/25/2017 7/7/2017   Total Score - - -   Total Score MyChart - 0 0   Total Score 0 - 0            Passed - Patient is age 18 or older       Passed - No active pregnancy on record       Passed - No positive pregnancy test in last 12 months       Passed - Recent (6 mo) or future visit with authorizing provider's specialty    Patient had office visit in the last 6 months or has a visit in the next 30 days with authorizing provider.  See chart review.             escitalopram (LEXAPRO) 10 MG tablet  Medication is being filled for 1 time refill only due to:  Patient needs to be seen because due for mood follow up with updated PHQ-9.   Next 5 appointments (look out 90 days)     Cj 15, 2018  8:00 AM CST   Office Visit with [facility]ita [facility]ez MD   [facility]    [address] [id number] [phone]                Roseline Arias, RN, BSN

## 2018-01-02 NOTE — TELEPHONE ENCOUNTER
Pt returned call,relayed message below. Pt scheduled OV for pe/labs phq9 for 02/16/17 with [facility]

## 2018-02-08 NOTE — PROGRESS NOTES
SUBJECTIVE:   CC: Hannah Krishna is an 48 year old woman who presents for preventive health visit.     Physical   Annual:     Getting at least 3 servings of Calcium per day::  Yes    Bi-annual eye exam::  Yes    Dental care twice a year::  Yes    Sleep apnea or symptoms of sleep apnea::  None    Diet::  Regular (no restrictions)    Frequency of exercise::  2-3 days/week    Duration of exercise::  15-30 minutes    Taking medications regularly::  Yes    Medication side effects::  None            Today's PHQ-2 Score:   PHQ-2 ( 1999 [employer]) 2/16/2018   Q1: Little interest or pleasure in doing things 0   Q2: Feeling down, depressed or hopeless 0   PHQ-2 Score 0   Q1: Little interest or pleasure in doing things Not at all   Q2: Feeling down, depressed or hopeless Not at all   PHQ-2 Score 0   Answers for HPI/ROS submitted by the patient on 2/16/2018   PHQ-2 Score: 0  If you checked off any problems, how difficult have these problems made it for you to do your work, take care of things at home, or get along with other people?: Not difficult at all  PHQ9 TOTAL SCORE: 1  CHITRA 7 TOTAL SCORE: 0      Abuse: Current or Past(Physical, Sexual or Emotional)- No  Do you feel safe in your environment - Yes    Social History   Substance Use Topics     Smoking status: Former Smoker     Packs/day: 0.10     Years: 18.00     Types: Cigarettes     Quit date: 11/7/2014     Smokeless tobacco: Never Used      Comment: since 2007     Alcohol use Yes      Comment: weekends <2-4 drinks     Alcohol Use 2/16/2018   If you drink alcohol, do you typically have greater than 3 drinks per day OR greater than 7 drinks per week?   No   No flowsheet data found.    Reviewed orders with patient.  Reviewed health maintenance and updated orders accordingly - Yes    Patient under age 50, mutual decision reflected in health maintenance.      Pertinent mammograms are reviewed under the imaging tab.  History of abnormal Pap smear: NO - age 30- 65 PAP every 3  "years recommended    Reviewed and updated as needed this visit by clinical staff  Tobacco  Allergies  Meds  Problems  Med Hx  Surg Hx  Fam Hx  Soc Hx          Reviewed and updated as needed this visit by Provider  Allergies  Meds  Problems            Review of Systems   Constitutional: Negative for chills and fever.   HENT: Negative for congestion, ear pain, hearing loss and sore throat.    Eyes: Negative for pain and visual disturbance.   Respiratory: Negative for cough and shortness of breath.    Cardiovascular: Negative for chest pain, palpitations and peripheral edema.   Gastrointestinal: Negative for abdominal pain, constipation, diarrhea, heartburn, hematochezia and nausea.   Genitourinary: Positive for menstrual problem (Heavy menses with clots). Negative for dysuria, frequency, genital sores, hematuria, pelvic pain, urgency and vaginal discharge.   Musculoskeletal: Positive for arthralgias. Negative for joint swelling and myalgias.   Skin: Negative for rash.   Neurological: Positive for headaches. Negative for dizziness, weakness and paresthesias.   Psychiatric/Behavioral: Negative for mood changes. The patient is not nervous/anxious.           OBJECTIVE:   /78 (BP Location: Right arm, Patient Position: Chair, Cuff Size: Adult Large)  Pulse 70  Temp 98.2  F (36.8  C) (Temporal)  Resp 14  Ht 5' 3\" (1.6 m)  Wt 220 lb (99.8 kg)  SpO2 97%  BMI 38.97 kg/m2  Physical Exam   Constitutional: She is oriented to person, place, and time. She appears well-developed and well-nourished. No distress.   HENT:   Right Ear: Tympanic membrane and external ear normal.   Left Ear: Tympanic membrane and external ear normal.   Nose: Nose normal.   Mouth/Throat: Oropharynx is clear and moist. No oropharyngeal exudate.   Eyes: Conjunctivae are normal. Pupils are equal, round, and reactive to light. Right eye exhibits no discharge. Left eye exhibits no discharge.   Neck: Neck supple. No tracheal deviation " present. No thyromegaly present.   Cardiovascular: Normal rate, regular rhythm, S1 normal, S2 normal, normal heart sounds and normal pulses.  Exam reveals no S3, no S4 and no friction rub.    No murmur heard.  Pulmonary/Chest: Effort normal and breath sounds normal. No respiratory distress. She has no wheezes. She has no rales.   Abdominal: Soft. Bowel sounds are normal. She exhibits no mass. There is no hepatosplenomegaly. There is no tenderness.   Genitourinary: No breast swelling, tenderness or discharge.   Musculoskeletal: Normal range of motion. She exhibits no edema.   Lymphadenopathy:     She has no cervical adenopathy.   Neurological: She is alert and oriented to person, place, and time. She has normal strength and normal reflexes. She exhibits normal muscle tone.   Skin: Skin is warm and dry. No rash noted.   Psychiatric: She has a normal mood and affect. Judgment and thought content normal. Cognition and memory are normal.     ASSESSMENT/PLAN:   1. Encounter for routine adult health examination without abnormal findings    - Lipid panel reflex to direct LDL Fasting    2. Anxiety  Has ran out of her medication for the last week due to financial concerns.  However now she has the money to  her medications.  She noticed is that she is more irritable and her mood is poor when she is off of the medication.  Refills given    - escitalopram (LEXAPRO) 10 MG tablet; Take 1 tablet (10 mg) by mouth daily  Dispense: 90 tablet; Refill: 3    3. Major depressive disorder, recurrent episode, moderate (H)  As above.    - escitalopram (LEXAPRO) 10 MG tablet; Take 1 tablet (10 mg) by mouth daily  Dispense: 90 tablet; Refill: 3  - buPROPion (WELLBUTRIN XL) 300 MG 24 hr tablet; Take 1 tablet (300 mg) by mouth every morning  Dispense: 90 tablet; Refill: 3    4. Hyperlipidemia, unspecified hyperlipidemia type    - Glucose    5. Morbid obesity (H)  Recommended starting to improve her exercise and work on her diet in order  "to achieve weight loss    6. Encounter for screening mammogram for breast cancer    - MA Screening Digital Bilateral; Future    7. Excessive or frequent menstruation  She would like to discuss with gynecology regarding her options for menstrual control    - TSH with free T4 reflex  - CBC with platelets  - OB/GYN REFERRAL    COUNSELING:  Reviewed preventive health counseling, as reflected in patient instructions    BP Screening:   Last 3 BP Readings:    BP Readings from Last 3 Encounters:   02/16/18 124/78   07/07/17 124/72   04/25/17 122/74       The following was recommended to the patient:  Re-screen BP within a year and recommended lifestyle modifications     reports that she quit smoking about 3 years ago. Her smoking use included Cigarettes. She has a 1.80 pack-year smoking history. She has never used smokeless tobacco.    Estimated body mass index is 38.97 kg/(m^2) as calculated from the following:    Height as of this encounter: 5' 3\" (1.6 m).    Weight as of this encounter: 220 lb (99.8 kg).   Weight management plan: Discussed healthy diet and exercise guidelines and patient will follow up in 12 months in clinic to re-evaluate.    Counseling Resources:  ATP IV Guidelines  Pooled Cohorts Equation Calculator  Breast Cancer Risk Calculator  FRAX Risk Assessment  ICSI Preventive Guidelines  Dietary Guidelines for Americans, 2010  USDA's MyPlate  ASA Prophylaxis  Lung CA Screening    Merlene Paz MD  [facility]  "

## 2018-02-09 DIAGNOSIS — F41.9 ANXIETY: ICD-10-CM

## 2018-02-09 DIAGNOSIS — F33.1 MAJOR DEPRESSIVE DISORDER, RECURRENT EPISODE, MODERATE (H): ICD-10-CM

## 2018-02-12 DIAGNOSIS — F33.1 MAJOR DEPRESSIVE DISORDER, RECURRENT EPISODE, MODERATE (H): ICD-10-CM

## 2018-02-12 RX ORDER — BUPROPION HYDROCHLORIDE 300 MG/1
TABLET ORAL
Qty: 30 TABLET | Refills: 0 | Status: SHIPPED | OUTPATIENT
Start: 2018-02-12 | End: 2018-02-16

## 2018-02-12 RX ORDER — ESCITALOPRAM OXALATE 10 MG/1
TABLET ORAL
Qty: 30 TABLET | Refills: 0 | Status: SHIPPED | OUTPATIENT
Start: 2018-02-12 | End: 2018-02-16

## 2018-02-16 ENCOUNTER — OFFICE VISIT (OUTPATIENT)
Dept: FAMILY MEDICINE | Facility: OTHER | Age: 48
End: 2018-02-16
Payer: COMMERCIAL

## 2018-02-16 VITALS
TEMPERATURE: 98.2 F | RESPIRATION RATE: 14 BRPM | SYSTOLIC BLOOD PRESSURE: 124 MMHG | BODY MASS INDEX: 38.98 KG/M2 | DIASTOLIC BLOOD PRESSURE: 78 MMHG | HEART RATE: 70 BPM | WEIGHT: 220 LBS | OXYGEN SATURATION: 97 % | HEIGHT: 63 IN

## 2018-02-16 DIAGNOSIS — F33.1 MAJOR DEPRESSIVE DISORDER, RECURRENT EPISODE, MODERATE (H): ICD-10-CM

## 2018-02-16 DIAGNOSIS — E66.01 MORBID OBESITY (H): ICD-10-CM

## 2018-02-16 DIAGNOSIS — E78.5 HYPERLIPIDEMIA, UNSPECIFIED HYPERLIPIDEMIA TYPE: ICD-10-CM

## 2018-02-16 DIAGNOSIS — F41.9 ANXIETY: ICD-10-CM

## 2018-02-16 DIAGNOSIS — Z00.00 ENCOUNTER FOR ROUTINE ADULT HEALTH EXAMINATION WITHOUT ABNORMAL FINDINGS: Primary | ICD-10-CM

## 2018-02-16 DIAGNOSIS — N92.0 EXCESSIVE OR FREQUENT MENSTRUATION: ICD-10-CM

## 2018-02-16 DIAGNOSIS — Z12.31 ENCOUNTER FOR SCREENING MAMMOGRAM FOR BREAST CANCER: ICD-10-CM

## 2018-02-16 LAB
CHOLEST SERPL-MCNC: 183 MG/DL
ERYTHROCYTE [DISTWIDTH] IN BLOOD BY AUTOMATED COUNT: 13.4 % (ref 10–15)
GLUCOSE SERPL-MCNC: 95 MG/DL (ref 70–99)
HCT VFR BLD AUTO: 36.4 % (ref 35–47)
HDLC SERPL-MCNC: 53 MG/DL
HGB BLD-MCNC: 11.9 G/DL (ref 11.7–15.7)
LDLC SERPL CALC-MCNC: 108 MG/DL
MCH RBC QN AUTO: 29.3 PG (ref 26.5–33)
MCHC RBC AUTO-ENTMCNC: 32.7 G/DL (ref 31.5–36.5)
MCV RBC AUTO: 90 FL (ref 78–100)
NONHDLC SERPL-MCNC: 130 MG/DL
PLATELET # BLD AUTO: 333 10E9/L (ref 150–450)
RBC # BLD AUTO: 4.06 10E12/L (ref 3.8–5.2)
TRIGL SERPL-MCNC: 108 MG/DL
TSH SERPL DL<=0.005 MIU/L-ACNC: 2.67 MU/L (ref 0.4–4)
WBC # BLD AUTO: 5.5 10E9/L (ref 4–11)

## 2018-02-16 PROCEDURE — 36415 COLL VENOUS BLD VENIPUNCTURE: CPT | Performed by: FAMILY MEDICINE

## 2018-02-16 PROCEDURE — 84443 ASSAY THYROID STIM HORMONE: CPT | Performed by: FAMILY MEDICINE

## 2018-02-16 PROCEDURE — 80061 LIPID PANEL: CPT | Performed by: FAMILY MEDICINE

## 2018-02-16 PROCEDURE — 82947 ASSAY GLUCOSE BLOOD QUANT: CPT | Performed by: FAMILY MEDICINE

## 2018-02-16 PROCEDURE — 85027 COMPLETE CBC AUTOMATED: CPT | Performed by: FAMILY MEDICINE

## 2018-02-16 PROCEDURE — 99396 PREV VISIT EST AGE 40-64: CPT | Performed by: FAMILY MEDICINE

## 2018-02-16 RX ORDER — BUPROPION HYDROCHLORIDE 300 MG/1
300 TABLET ORAL EVERY MORNING
Qty: 90 TABLET | Refills: 3 | Status: SHIPPED | OUTPATIENT
Start: 2018-02-16 | End: 2019-03-13

## 2018-02-16 RX ORDER — ESCITALOPRAM OXALATE 10 MG/1
10 TABLET ORAL DAILY
Qty: 90 TABLET | Refills: 3 | Status: SHIPPED | OUTPATIENT
Start: 2018-02-16 | End: 2019-03-13

## 2018-02-16 ASSESSMENT — ANXIETY QUESTIONNAIRES
3. WORRYING TOO MUCH ABOUT DIFFERENT THINGS: NOT AT ALL
4. TROUBLE RELAXING: NOT AT ALL
1. FEELING NERVOUS, ANXIOUS, OR ON EDGE: NOT AT ALL
GAD7 TOTAL SCORE: 0
GAD7 TOTAL SCORE: 0
5. BEING SO RESTLESS THAT IT IS HARD TO SIT STILL: NOT AT ALL
GAD7 TOTAL SCORE: 0
7. FEELING AFRAID AS IF SOMETHING AWFUL MIGHT HAPPEN: NOT AT ALL
2. NOT BEING ABLE TO STOP OR CONTROL WORRYING: NOT AT ALL
7. FEELING AFRAID AS IF SOMETHING AWFUL MIGHT HAPPEN: NOT AT ALL
6. BECOMING EASILY ANNOYED OR IRRITABLE: NOT AT ALL

## 2018-02-16 ASSESSMENT — ENCOUNTER SYMPTOMS
CONSTIPATION: 0
CHILLS: 0
EYE PAIN: 0
JOINT SWELLING: 0
PARESTHESIAS: 0
HEMATOCHEZIA: 0
SHORTNESS OF BREATH: 0
FREQUENCY: 0
PALPITATIONS: 0
SORE THROAT: 0
WEAKNESS: 0
DIARRHEA: 0
NERVOUS/ANXIOUS: 0
FEVER: 0
ARTHRALGIAS: 1
COUGH: 0
ABDOMINAL PAIN: 0
DYSURIA: 0
NAUSEA: 0
DIZZINESS: 0
MYALGIAS: 0
HEMATURIA: 0
HEADACHES: 1
HEARTBURN: 0

## 2018-02-16 ASSESSMENT — PATIENT HEALTH QUESTIONNAIRE - PHQ9
SUM OF ALL RESPONSES TO PHQ QUESTIONS 1-9: 1
10. IF YOU CHECKED OFF ANY PROBLEMS, HOW DIFFICULT HAVE THESE PROBLEMS MADE IT FOR YOU TO DO YOUR WORK, TAKE CARE OF THINGS AT HOME, OR GET ALONG WITH OTHER PEOPLE: NOT DIFFICULT AT ALL
SUM OF ALL RESPONSES TO PHQ QUESTIONS 1-9: 1

## 2018-02-16 ASSESSMENT — PAIN SCALES - GENERAL: PAINLEVEL: NO PAIN (0)

## 2018-02-16 NOTE — MR AVS SNAPSHOT
After Visit Summary   2/16/2018    Hannah Krishna    MRN: [id number]           Patient Information     Date Of Birth          1970        Visit Information        Provider Department      2/16/2018 11:40 AM Merlene Paz MD [facility]        Today's Diagnoses     Encounter for routine adult health examination without abnormal findings    -  1    Anxiety        Major depressive disorder, recurrent episode, moderate (H)        Hyperlipidemia, unspecified hyperlipidemia type        Morbid obesity (H)        Encounter for screening mammogram for breast cancer        Excessive or frequent menstruation          Care Instructions      Preventive Health Recommendations  Female Ages 40 to 49    Yearly exam:     See your health care provider every year in order to  1. Review health changes.   2. Discuss preventive care.    3. Review your medicines if your doctor prescribed any.      Get a Pap test every three years (unless you have an abnormal result and your provider advises testing more often).      If you get Pap tests with HPV test, you only need to test every 5 years, unless you have an abnormal result. You do not need a Pap test if your uterus was removed (hysterectomy) and you have not had cancer.      You should be tested each year for STDs (sexually transmitted diseases), if you're at risk.       Ask your doctor if you should have a mammogram.      Have a colonoscopy (test for colon cancer) if someone in your family has had colon cancer or polyps before age 50.       Have a cholesterol test every 5 years.       Have a diabetes test (fasting glucose) after age 45. If you are at risk for diabetes, you should have this test every 3 years.    Shots: Get a flu shot each year. Get a tetanus shot every 10 years.     Nutrition:     Eat at least 5 servings of fruits and vegetables each day.    Eat whole-grain bread, whole-wheat pasta and brown rice instead of white grains and  rice.    Talk to your provider about Calcium and Vitamin D.     Lifestyle    Exercise at least 150 minutes a week (an average of 30 minutes a day, 5 days a week). This will help you control your weight and prevent disease.    Limit alcohol to one drink per day.    No smoking.     Wear sunscreen to prevent skin cancer.    See your dentist every six months for an exam and cleaning.          Follow-ups after your visit        Additional Services     OB/GYN REFERRAL       Your provider has referred you to:  [facility]: [facility] [phone]   [email or web address]    Please be aware that coverage of these services is subject to the terms and limitations of your health insurance plan.  Call member services at your health plan with any benefit or coverage questions.      Please bring the following with you to your appointment:    (1) Any X-Rays, CTs or MRIs which have been performed.  Contact the facility where they were done to arrange for  prior to your scheduled appointment.   (2) List of current medications   (3) This referral request   (4) Any documents/labs given to you for this referral                  Future tests that were ordered for you today     Open Future Orders        Priority Expected Expires Ordered    MA Screening Digital Bilateral Routine  2/16/2019 2/16/2018            Who to contact     If you have questions or need follow up information about today's clinic visit or your schedule please contact [facility] directly at [phone].  Normal or non-critical lab and imaging results will be communicated to you by MyChart, letter or phone within 4 business days after the clinic has received the results. If you do not hear from us within 7 days, please contact the clinic through MyChart or phone. If you have a critical or abnormal lab result, we will notify you by phone as soon as possible.  Submit refill requests through [employer] or call  "your pharmacy and they will forward the refill request to us. Please allow 3 business days for your refill to be completed.          Additional Information About Your Visit        [employer] Information     [employer] gives you secure access to your electronic health record. If you see a primary care provider, you can also send messages to your care team and make appointments. If you have questions, please call your primary care clinic.  If you do not have a primary care provider, please call [phone] and they will assist you.        Care EveryWhere ID     This is your Care EveryWhere ID. This could be used by other organizations to access your [facility] medical records  [id number]        Your Vitals Were     Pulse Temperature Respirations Height Pulse Oximetry BMI (Body Mass Index)    70 98.2  F (36.8  C) (Temporal) 14 5' 3\" (1.6 m) 97% 38.97 kg/m2       Blood Pressure from Last 3 Encounters:   02/16/18 124/78   07/07/17 124/72   04/25/17 122/74    Weight from Last 3 Encounters:   02/16/18 220 lb (99.8 kg)   07/07/17 205 lb (93 kg)   04/25/17 212 lb (96.2 kg)              We Performed the Following     CBC with platelets     Glucose     Lipid panel reflex to direct LDL Fasting     OB/GYN REFERRAL     TSH with free T4 reflex          Today's Medication Changes          These changes are accurate as of 2/16/18 12:17 PM.  If you have any questions, ask your nurse or doctor.               These medicines have changed or have updated prescriptions.        Dose/Directions    buPROPion 300 MG 24 hr tablet   Commonly known as:  WELLBUTRIN XL   This may have changed:    - how much to take  - how to take this  - when to take this  - additional instructions   Used for:  Major depressive disorder, recurrent episode, moderate (H)   Changed by:  Merlene Paz MD        Dose:  300 mg   Take 1 tablet (300 mg) by mouth every morning   Quantity:  90 tablet   Refills:  3       escitalopram 10 MG tablet   Commonly known as:  " LEXAPRO   This may have changed:  See the new instructions.   Used for:  Anxiety, Major depressive disorder, recurrent episode, moderate (H)   Changed by:  Merlene Paz MD        Dose:  10 mg   Take 1 tablet (10 mg) by mouth daily   Quantity:  90 tablet   Refills:  3            Where to get your medicines      These medications were [place] [facility] [place] [address]  [address] [facility] [address]     Phone:  [phone]     buPROPion 300 MG 24 hr tablet    escitalopram 10 MG tablet                Primary Care Provider Office Phone # Fax #    Merlene Paz [phone]       [address]  [facility] [address]        Equal Access to Services     [facility]: Hadii aad ku hadasho Soomaali, waaxda luqadaha, qaybta kaalmada adeegyada, gurmeet brown . So [facility] [phone].    ATENCIÓN: Si habla español, tiene a manley disposición servicios gratuitos de asistencia lingüística. [facility] [phone].    We comply with applicable federal civil rights laws and [place] laws. We do not discriminate on the basis of race, color, national origin, age, disability, sex, sexual orientation, or gender identity.            Thank you!     Thank you for choosing [facility]  for your care. Our goal is always to provide you with excellent care. Hearing back from our patients is one way we can continue to improve our services. Please take a few minutes to complete the written survey that you may receive in the mail after your visit with us. Thank you!             Your Updated Medication List - Protect others around you: Learn how to safely use, store and throw away your medicines at www.disposemymeds.org.          This list is accurate as of 2/16/18 12:17 PM.  Always use your most recent med list.                   Brand Name Dispense Instructions for use Diagnosis    buPROPion 300 MG 24 hr tablet    WELLBUTRIN XL     90 tablet    Take 1 tablet (300 mg) by mouth every morning    Major depressive disorder, recurrent episode, moderate (H)       escitalopram 10 MG tablet    LEXAPRO    90 tablet    Take 1 tablet (10 mg) by mouth daily    Anxiety, Major depressive disorder, recurrent episode, moderate (H)

## 2018-02-16 NOTE — NURSING NOTE
"Chief Complaint   Patient presents with     Physical     Panel Management     height, flu, lipid, phq9       Initial /78 (BP Location: Right arm, Patient Position: Chair, Cuff Size: Adult Large)  Pulse 70  Temp 98.2  F (36.8  C) (Temporal)  Resp 14  Ht 5' 3\" (1.6 m)  Wt 220 lb (99.8 kg)  SpO2 97%  BMI 38.97 kg/m2 Estimated body mass index is 38.97 kg/(m^2) as calculated from the following:    Height as of this encounter: 5' 3\" (1.6 m).    Weight as of this encounter: 220 lb (99.8 kg).  Medication Reconciliation: complete   Ирина Arita CMA      "

## 2018-02-17 ASSESSMENT — PATIENT HEALTH QUESTIONNAIRE - PHQ9: SUM OF ALL RESPONSES TO PHQ QUESTIONS 1-9: 1

## 2018-02-17 ASSESSMENT — ANXIETY QUESTIONNAIRES: GAD7 TOTAL SCORE: 0

## 2018-03-08 ENCOUNTER — RADIANT APPOINTMENT (OUTPATIENT)
Dept: ULTRASOUND IMAGING | Facility: OTHER | Age: 48
End: 2018-03-08
Attending: OBSTETRICS & GYNECOLOGY
Payer: COMMERCIAL

## 2018-03-08 ENCOUNTER — OFFICE VISIT (OUTPATIENT)
Dept: OBGYN | Facility: OTHER | Age: 48
End: 2018-03-08
Payer: COMMERCIAL

## 2018-03-08 VITALS
DIASTOLIC BLOOD PRESSURE: 84 MMHG | WEIGHT: 219.5 LBS | SYSTOLIC BLOOD PRESSURE: 144 MMHG | HEART RATE: 62 BPM | BODY MASS INDEX: 38.88 KG/M2

## 2018-03-08 DIAGNOSIS — N93.9 ABNORMAL UTERINE BLEEDING (AUB): Primary | ICD-10-CM

## 2018-03-08 DIAGNOSIS — N93.9 ABNORMAL UTERINE BLEEDING (AUB): ICD-10-CM

## 2018-03-08 PROCEDURE — 99214 OFFICE O/P EST MOD 30 MIN: CPT | Performed by: OBSTETRICS & GYNECOLOGY

## 2018-03-08 PROCEDURE — 76856 US EXAM PELVIC COMPLETE: CPT

## 2018-03-08 PROCEDURE — 76830 TRANSVAGINAL US NON-OB: CPT

## 2018-03-08 NOTE — MR AVS SNAPSHOT
After Visit Summary   3/8/2018    Hannah Krishna    MRN: [id number]           Patient Information     Date Of Birth          1970        Visit Information        Provider Department      3/8/2018 1:00 PM Radha Oakley MD [facility]        Today's Diagnoses     Abnormal uterine bleeding (AUB)    -  1       Follow-ups after your visit        Future tests that were ordered for you today     Open Future Orders        Priority Expected Expires Ordered    US Pelvic Complete with Transvaginal Routine  6/6/2018 3/8/2018            Who to contact     If you have questions or need follow up information about today's clinic visit or your schedule please contact [facility] directly at [phone].  Normal or non-critical lab and imaging results will be communicated to you by MyChart, letter or phone within 4 business days after the clinic has received the results. If you do not hear from us within 7 days, please contact the clinic through [employer] or phone. If you have a critical or abnormal lab result, we will notify you by phone as soon as possible.  Submit refill requests through [employer] or call your pharmacy and they will forward the refill request to us. Please allow 3 business days for your refill to be completed.          Additional Information About Your Visit        MyChart Information     [employer] gives you secure access to your electronic health record. If you see a primary care provider, you can also send messages to your care team and make appointments. If you have questions, please call your primary care clinic.  If you do not have a primary care provider, please call [phone] and they will assist you.        Care EveryWhere ID     This is your Care EveryWhere ID. This could be used by other organizations to access your [facility] medical records  [id number]        Your Vitals Were     Pulse Last Period BMI (Body Mass Index)             62 03/03/2018  (Approximate) 38.88 kg/m2          Blood Pressure from Last 3 Encounters:   03/08/18 144/84   02/16/18 124/78   07/07/17 124/72    Weight from Last 3 Encounters:   03/08/18 219 lb 8 oz (99.6 kg)   02/16/18 220 lb (99.8 kg)   07/07/17 205 lb (93 kg)               Primary Care Provider Office Phone # Fax #    Merlene GANNON MD [place] [phone]       [address]  [facility] [place] [facility] [address]        Equal Access to Services     [facility]: Hadii paige wilder hadasho Soomaali, waaxda luqadaha, qaybta kaalmada ademallika, gurmeet brown . So [facility] [phone].    ATENCIÓN: Si habla español, tiene a manley disposición servicios gratuitos de asistencia lingüística. [facility] [phone].    We comply with applicable federal civil rights laws and [place] laws. We do not discriminate on the basis of race, color, national origin, age, disability, sex, sexual orientation, or gender identity.            Thank you!     Thank you for choosing [facility]  for your care. Our goal is always to provide you with excellent care. Hearing back from our patients is one way we can continue to improve our services. Please take a few minutes to complete the written survey that you may receive in the mail after your visit with us. Thank you!             Your Updated Medication List - Protect others around you: Learn how to safely use, store and throw away your medicines at www.disposemymeds.org.          This list is accurate as of 3/8/18  1:23 PM.  Always use your most recent med list.                   Brand Name Dispense Instructions for use Diagnosis    buPROPion 300 MG 24 hr tablet    WELLBUTRIN XL    90 tablet    Take 1 tablet (300 mg) by mouth every morning    Major depressive disorder, recurrent episode, moderate (H)       escitalopram 10 MG tablet    LEXAPRO    90 tablet    Take 1 tablet (10 mg) by mouth daily    Anxiety, Major depressive disorder, recurrent episode, moderate (H)

## 2018-03-08 NOTE — NURSING NOTE
"Chief Complaint   Patient presents with     Abnormal Uterine Bleeding     heavy peirods       Initial /84 (BP Location: Right arm, Patient Position: Chair, Cuff Size: Adult Regular)  Pulse 62  Wt 219 lb 8 oz (99.6 kg)  LMP 2018 (Approximate)  BMI 38.88 kg/m2 Estimated body mass index is 38.88 kg/(m^2) as calculated from the following:    Height as of 18: 5' 3\" (1.6 m).    Weight as of this encounter: 219 lb 8 oz (99.6 kg).  BP completed using cuff size: regular        The following HM Due: mammogram      The following patient reported/Care Every where data was sent to:  P ABSTRACT QUALITY INITIATIVES [[id number]]       orders have been placed    Sunita Campbell CMA  2018           "

## 2018-03-08 NOTE — PROGRESS NOTES
OB/GYN   3/8/2018      NAME:  Hannah Krishna  PCP:  CortneyronMerlene  MRN:  [id number]    Impression / Plan     48 year old  with:      ICD-10-CM    1. Abnormal uterine bleeding (AUB) N93.9 US Pelvic Complete with Transvaginal     Body mass index is 38.88 kg/(m^2).     Discussed possible etiologies for abnormal uterine bleeding.  Plan US.    Consider Mirena, endometrial biopsy with placement.     I will call her with the result and plan to have her come in for follow up and possible IUD placement/endometrial biopsy.         Chief Complaint     Chief Complaint   Patient presents with     Abnormal Uterine Bleeding     heavy peirods       HPI     Hannah Krishna is a  48 year old female who is seen for heavy bleeding    Patient's last menstrual period was 2018 (approximate). Monthly.  5-7 days.  Heavy for 3 days.  Clots.  Changes a tampon every couple of hours or so when it is heavy.  Regular and super tampons.   Midol helps but makes tired.      Patient has had a tubal.    She has a history of htn.      Date of Last Pap Smear:   Lab Results   Component Value Date    PAP NIL 2016         Problem List     Patient Active Problem List    Diagnosis Date Noted     Morbid obesity (H) 2018     Priority: Medium     Anxiety 2013     Priority: Medium     Major depressive disorder, recurrent episode, moderate (H) 2011     Priority: Medium     Hyperlipidemia, unspecified 10/31/2010     Priority: Medium     Endometriosis of uterus 2003     Priority: Medium     Polycystic ovaries 2003     Priority: Medium       Medications     Current Outpatient Prescriptions   Medication     escitalopram (LEXAPRO) 10 MG tablet     buPROPion (WELLBUTRIN XL) 300 MG 24 hr tablet     No current facility-administered medications for this visit.         Allergies     Allergies   Allergen Reactions     No Known Drug Allergies        Past Medical/Surgical History     Past Medical History:    Diagnosis Date     Chest pain, unspecified 11/2005    atypical - stress echo neg     Chronic depressive personality disorder      Esophageal reflux 12/11/2013     Headache 7/31/2013     Hypertension      Lateral epicondylitis 7/28/2015     Malaise and fatigue 8/1/2005     Obesity, unspecified      Other malaise and fatigue 8/2005     Polycystic ovaries     per pt     Sleep disturbance, unspecified 10/2005    see sleep study     Unspecified disorder of thyroid 9/2005    thyroid nodule. I-123 WNL.       Past Surgical History:   Procedure Laterality Date     ARTHROSCOPY SHOULDER BICEPS TENODESIS REPAIR Right 4/8/2016    Procedure: ARTHROSCOPY SHOULDER BICEPS TENODESIS REPAIR;  Surgeon: Gilbert Cortes DO;  [facility]     ARTHROSCOPY SHOULDER DECOMPRESSION Right 4/8/2016    Procedure: ARTHROSCOPY SHOULDER DECOMPRESSION;  Surgeon: Gilbert Cortes DO;  [facility]     ARTHROSCOPY SHOULDER DISTAL CLAVICLE REPAIR Right 4/8/2016    Procedure: ARTHROSCOPY SHOULDER DISTAL CLAVICLE RESECTION;  Surgeon: Gilbert Cortes DO;  [facility]     ARTHROSCOPY SHOULDER, OPEN ROTATOR CUFF REPAIR, COMBINED Right 4/8/2016    Procedure: COMBINED ARTHROSCOPY SHOULDER, OPEN ROTATOR CUFF REPAIR;  Surgeon: Gilbert Cortes DO;  [facility]     BUNIONECTOMY  10/24/2013    Procedure: BUNIONECTOMY;;  Surgeon: Ariel Mcdermott DPM;  Location:  OR     C LIGATE FALLOPIAN TUBE,POSTPARTUM  1998    Tubal Ligation     C NONSPECIFIC PROCEDURE  2002    kidney stones removed     C NONSPECIFIC PROCEDURE  060503    lasik surgery     C STRESS ECHO TEST NL  11/2005    neg (low probability of ischemic disease)     EXCISE EXOSTOSIS FOOT  10/24/2013    Procedure: EXCISE EXOSTOSIS FOOT;  left excision of accessory navicular, bunion repair with osteotomy, and navicular remodeling;  Surgeon: Ariel Mcdermott DPM;  [facility]     EYE SURGERY       HC RAD RX IODINE 123 SODIUM IODIDE  [facility]   [facility] [place]    I 123 thryoid -WNL     HEAD & NECK SURGERY      Why is this one highlighted on yes already??     OSTEOTOMY FOOT  10/24/2013    Procedure: OSTEOTOMY FOOT;;  Surgeon: Ariel Mcdermott DPM;  Location:  OR        Social History     Social History     Social History     Marital status:      Spouse name: N/A     Number of children: 2     Years of education: 12     Occupational History     [employer]     Social History Main Topics     Smoking status: Former Smoker     Packs/day: 0.10     Years: 18.00     Types: Cigarettes     Quit date: 11/7/2014     Smokeless tobacco: Never Used      Comment: since 2007     Alcohol use Yes      Comment: weekends <2-4 drinks     Drug use: No     Sexual activity: Yes     Partners: Male     Birth control/ protection: Surgical      Comment: tubal ligation     Other Topics Concern     Parent/Sibling W/ Cabg, Mi Or Angioplasty Before 65f 55m? No     Social History Narrative    Denies domestic violence issues.       Family History      Family History   Problem Relation Age of Onset     Adopted: Yes     Unknown/Adopted Mother      Unknown/Adopted Father      Unknown/Adopted Maternal Grandmother      Unknown/Adopted Maternal Grandfather      Unknown/Adopted Paternal Grandmother      Unknown/Adopted Paternal Grandfather        ROS     A 10 organ review of systems was asked and the pertinent positives and negatives are listed in the HPI. All other organ systems can be considered negative.     Physical Exam   Vitals: /84 (BP Location: Right arm, Patient Position: Chair, Cuff Size: Adult Regular)  Pulse 62  Wt 219 lb 8 oz (99.6 kg)  LMP 03/03/2018 (Approximate)  BMI 38.88 kg/m2    General: Comfortable, no obvious distress, normal obese body habitus  NPsych: Alert and orientated x 3. Appropriate affect, good insight.   : deferred until next visit.       Labs/Imaging       Labs were reviewed in Epic   TSH   Date Value Ref Range  Status   02/16/2018 2.67 0.40 - 4.00 mU/L Final     Hemoglobin   Date Value Ref Range Status   02/16/2018 11.9 11.7 - 15.7 g/dL Final       Imaging was reviewed in Epic.   No recent US    25 minutes was spent with patient, more than 50% counseling and coordinating care    Radha Oakley MD

## 2018-03-14 DIAGNOSIS — F41.9 ANXIETY: ICD-10-CM

## 2018-03-14 DIAGNOSIS — F33.1 MAJOR DEPRESSIVE DISORDER, RECURRENT EPISODE, MODERATE (H): ICD-10-CM

## 2018-03-14 RX ORDER — ESCITALOPRAM OXALATE 10 MG/1
TABLET ORAL
Qty: 30 TABLET | Refills: 0 | OUTPATIENT
Start: 2018-03-14

## 2018-03-14 NOTE — TELEPHONE ENCOUNTER
escitalopram    Sent 2/16/2018 with 1 year supply. Refill not appropriate at this time.     Yadira Gardiner, RN, BSN

## 2018-04-09 ENCOUNTER — OFFICE VISIT (OUTPATIENT)
Dept: OBGYN | Facility: OTHER | Age: 48
End: 2018-04-09
Payer: COMMERCIAL

## 2018-04-09 VITALS
WEIGHT: 219 LBS | DIASTOLIC BLOOD PRESSURE: 90 MMHG | BODY MASS INDEX: 38.79 KG/M2 | SYSTOLIC BLOOD PRESSURE: 150 MMHG | HEART RATE: 64 BPM

## 2018-04-09 DIAGNOSIS — N93.9 ABNORMAL UTERINE BLEEDING (AUB): ICD-10-CM

## 2018-04-09 DIAGNOSIS — Z30.430 ENCOUNTER FOR IUD INSERTION: Primary | ICD-10-CM

## 2018-04-09 PROBLEM — Z97.5 IUD (INTRAUTERINE DEVICE) IN PLACE: Status: ACTIVE | Noted: 2018-04-09

## 2018-04-09 PROCEDURE — 58300 INSERT INTRAUTERINE DEVICE: CPT | Performed by: OBSTETRICS & GYNECOLOGY

## 2018-04-09 PROCEDURE — 88305 TISSUE EXAM BY PATHOLOGIST: CPT | Performed by: OBSTETRICS & GYNECOLOGY

## 2018-04-09 PROCEDURE — 58100 BIOPSY OF UTERUS LINING: CPT | Performed by: OBSTETRICS & GYNECOLOGY

## 2018-04-09 PROCEDURE — 99201 ZZC OFFICE/OUTPT VISIT, NEW, LEVEL I: CPT | Mod: 25 | Performed by: OBSTETRICS & GYNECOLOGY

## 2018-04-09 NOTE — MR AVS SNAPSHOT
After Visit Summary   4/9/2018    Hannah Krishna    MRN: [id number]           Patient Information     Date Of Birth          1970        Visit Information        Provider Department      4/9/2018 1:30 PM Radha Oakley MD [facility]        Today's Diagnoses     Encounter for IUD insertion    -  1    Abnormal uterine bleeding (AUB)          Care Instructions    You had a Mirena  intrauterine device (IUD) placed today.  You should abstain from intercourse for one week after insertion.   The IUD will be effective for 5 years and then will need removal or replacement.    Call if:     You have bad pain in your lower belly     You have excessive bleeding    You cannot feel the string of the IUD or if the string seems shorter than usual    You think your IUD might have moved or fallen out    You had sex with someone who has or might have an STD, or you think you have an STD    You have foul-smelling discharge    You have an unexplained fever    If you think you might be pregnant                 Follow-ups after your visit        Follow-up notes from your care team     Return in about 3 months (around 7/9/2018), or if symptoms worsen or fail to improve.      Who to contact     If you have questions or need follow up information about today's clinic visit or your schedule please contact [facility] directly at [phone].  Normal or non-critical lab and imaging results will be communicated to you by MyChart, letter or phone within 4 business days after the clinic has received the results. If you do not hear from us within 7 days, please contact the clinic through MyChart or phone. If you have a critical or abnormal lab result, we will notify you by phone as soon as possible.  Submit refill requests through [employer] or call your pharmacy and they will forward the refill request to us. Please allow 3 business days for your refill to be completed.          Additional  Information About Your Visit        [employer] Information     [employer] gives you secure access to your electronic health record. If you see a primary care provider, you can also send messages to your care team and make appointments. If you have questions, please call your primary care clinic.  If you do not have a primary care provider, please call [phone] and they will assist you.        Care EveryWhere ID     This is your Care EveryWhere ID. This could be used by other organizations to access your [facility] medical records  [id number]        Your Vitals Were     Pulse Last Period BMI (Body Mass Index)             64 03/31/2018 38.79 kg/m2          Blood Pressure from Last 3 Encounters:   04/09/18 150/90   03/08/18 144/84   02/16/18 124/78    Weight from Last 3 Encounters:   04/09/18 219 lb (99.3 kg)   03/08/18 219 lb 8 oz (99.6 kg)   02/16/18 220 lb (99.8 kg)              We Performed the Following     ENDOMETRIAL BIOPSY W/O CERVICAL DILATION     HC LEVONORGESTREL IU 52MG 5 YR     INSERTION INTRAUTERINE DEVICE     Surgical pathology exam        Primary Care Provider Office Phone # Fax #    Merlene GANNON MD [place] [phone]       [address]  [place] [facility] [address]        Equal Access to Services     LONNY FARIAS : Hadii aad ku hadasho Soomaali, waaxda luqadaha, qaybta kaalmada adeegyada, gurmeet brown . So [facility] [phone].    ATENCIÓN: Si habla español, tiene a manley disposición servicios gratuitos de asistencia lingüística. Llame al [phone].    We comply with applicable federal civil rights laws and [place] laws. We do not discriminate on the basis of race, color, national origin, age, disability, sex, sexual orientation, or gender identity.            Thank you!     Thank you for choosing [facility]  for your care. Our goal is always to provide you with excellent care. Hearing back from our patients is one way we can continue to improve  our services. Please take a few minutes to complete the written survey that you may receive in the mail after your visit with us. Thank you!             Your Updated Medication List - Protect others around you: Learn how to safely use, store and throw away your medicines at www.disposemymeds.org.          This list is accurate as of 4/9/18  1:54 PM.  Always use your most recent med list.                   Brand Name Dispense Instructions for use Diagnosis    buPROPion 300 MG 24 hr tablet    WELLBUTRIN XL    90 tablet    Take 1 tablet (300 mg) by mouth every morning    Major depressive disorder, recurrent episode, moderate (H)       escitalopram 10 MG tablet    LEXAPRO    90 tablet    Take 1 tablet (10 mg) by mouth daily    Anxiety, Major depressive disorder, recurrent episode, moderate (H)

## 2018-04-09 NOTE — PROGRESS NOTES
OB/GYN  2018      NAME:  Hannah Krishna  PCP:  Merlene Paz  MRN:  [id number]    Impression / Plan     48 year old  with:      ICD-10-CM    1. Encounter for IUD insertion Z30.430 INSERTION INTRAUTERINE DEVICE     HC LEVONORGESTREL IU 52MG 5 YR   2. Abnormal uterine bleeding (AUB) N93.9 INSERTION INTRAUTERINE DEVICE     HC LEVONORGESTREL IU 52MG 5 YR     ENDOMETRIAL BIOPSY W/O CERVICAL DILATION     Surgical pathology exam     Obesity - Body mass index is 38.79 kg/(m^2).       Discussed management options for abnormal uterine bleeding and ultrasound findings. Patient would like to proceed with endometrial biopsy and IUD insertion as scheduled. See below.    Chief Complaint     Chief Complaint   Patient presents with     IUD     placement       HPI     Hannah Krishna is a  48 year old female who is seen for IUD placement.     I last saw the patient 2018 for abnormal uterine bleeding.    US 3/8/18:  Uterus 8.1 x 4.6 x 5 cm.  EMS 3 mm.  Normal US.      Management options were discussed at her last visit. She would like to proceed with IUD placement today for management of abnormal uterine bleeding.      Patient's last menstrual period was 2018.         Problem List     Patient Active Problem List    Diagnosis Date Noted     Morbid obesity (H) 2018     Priority: Medium     Anxiety 2013     Priority: Medium     Major depressive disorder, recurrent episode, moderate (H) 2011     Priority: Medium     Hyperlipidemia, unspecified 10/31/2010     Priority: Medium     Endometriosis of uterus 2003     Priority: Medium     Polycystic ovaries 2003     Priority: Medium       Medications     Current Outpatient Prescriptions   Medication     escitalopram (LEXAPRO) 10 MG tablet     buPROPion (WELLBUTRIN XL) 300 MG 24 hr tablet     No current facility-administered medications for this visit.         Allergies     Allergies   Allergen Reactions     No Known Drug  Allergies        ROS     A /GI organ review of systems was asked and the pertinent positives and negatives are listed in the HPI.     Physical Exam   Vitals: /90 (BP Location: Right arm, Patient Position: Chair, Cuff Size: Adult Regular)  Pulse 64  Wt 219 lb (99.3 kg)  LMP 03/31/2018  BMI 38.79 kg/m2    General: Comfortable, no obvious distress, obese body habitus    Psych: Alert and orientated x 3. Appropriate affect, good insight.   : Normal female external genitalia.  No lesions.  Urethral meatus normal.  Speculum exam reveals a normal vaginal vault, normal cervix.  No abnormal discharge.      Labs/Imaging       Labs were reviewed in Epic   TSH   Date Value Ref Range Status   02/16/2018 2.67 0.40 - 4.00 mU/L Final     Hemoglobin   Date Value Ref Range Status   02/16/2018 11.9 11.7 - 15.7 g/dL Final       Imaging was reviewed in Epic.       10 minutes was spent with patient, more than 50% counseling and coordinating care, exclusive of IUD and endometrial biopsy     Radha Oakley MD          IUD INSERTION    Hannah Krishna is a 48 year old  Women who presents today requesting placement of an Mirena iud.    We discussed risks, benefits, and alternatives including but not limited to:     Possibility of pregnancy and ectopic pregnancy.    Possibility of pelvic inflammatory disease, particularly in the first 20 days and with new partners.    Risk of uterine perforation or IUD expulsion.    Possibility of difficult removal.    Spotting or heavy bleeding.    Cramping, pain or infection during or after insertion.    The patient was given patient information on the IUD and the patient education brochure from the .  She understands the main indication for removal is abnormal bleeding.  The patient has given consent to proceed with placement of the IUD.  She wishes to proceed.  All questions answered.    PROCEDURE:    PROCEDURE: ENDOMETRIAL BIOPSY      After discussing the procedure and obtaining  consent the patient was positioned in lithotomy and the cervix visualized with the speculum. The cervix was cleansed with betadine, allis placed and the pipelle was inserted to  8  cm and small amount of tissue obtained. She tolerated this without difficulty.         Type of IUD: Mirena   After the endometrial biopsy, the IUD is inserted into the uterus under sterile conditions in the usual fashion.    Lot number [id number] and expiration date Oct 2020.  NDC#  20303-595-63 The IUD string is then cut to 3.5 cm.    The patient tolerated this procedure without immediate complication.  The patient is to return or call immediately for any unexplained fever, abdominal or pelvic pain, excessive bleeding, possibility of pregnancy, foul-smelling discharge, sense that the IUD has been expelled.  All questions were answered.  Patient is aware that the IUD will be effective for 5 years and then will need removal or replacement.  Barrier methods for the first week advised.    Return to clinic in 3 months for IUD check as needed    Radha Oakley MD

## 2018-04-09 NOTE — NURSING NOTE
"Chief Complaint   Patient presents with     IUD     placement       Initial /90 (BP Location: Right arm, Patient Position: Chair, Cuff Size: Adult Regular)  Pulse 64  Wt 219 lb (99.3 kg)  LMP 2018  BMI 38.79 kg/m2 Estimated body mass index is 38.79 kg/(m^2) as calculated from the following:    Height as of 18: 5' 3\" (1.6 m).    Weight as of this encounter: 219 lb (99.3 kg).  BP completed using cuff size: regular        The following HM Due: NONE      The following patient reported/Care Every where data was sent to:  P ABSTRACT QUALITY INITIATIVES [90325]       N/a    Sunita Campbell CMA  2018           "

## 2018-04-09 NOTE — PATIENT INSTRUCTIONS
You had a Mirena  intrauterine device (IUD) placed today.  You should abstain from intercourse for one week after insertion.   The IUD will be effective for 5 years and then will need removal or replacement.    Call if:     You have bad pain in your lower belly     You have excessive bleeding    You cannot feel the string of the IUD or if the string seems shorter than usual    You think your IUD might have moved or fallen out    You had sex with someone who has or might have an STD, or you think you have an STD    You have foul-smelling discharge    You have an unexplained fever    If you think you might be pregnant

## 2018-04-11 LAB — COPATH REPORT: NORMAL

## 2018-07-26 NOTE — PROGRESS NOTES
SUBJECTIVE:   Hannah Krishna is a 48 year old female who presents to clinic today for the following health issues:      History of Present Illness     Depression & Anxiety Follow-up:     Depression/Anxiety:  Depression only    Status since last visit::  Stable    Other associated symptoms of depression::  None    Significant life event::  YES    Current substance use::  None    Anxiety/Panic symptoms::  No       Today's PHQ-9         PHQ-9 Total Score:     (P) 0   PHQ-9 Q9 Suicidal ideation:   (P) Not at all   Thoughts of suicide or self harm:      Self-harm Plan:        Self-harm Action:          Safety concerns for self or others:       CHITRA-7 Total Score: (P) 0  Answers for HPI/ROS submitted by the patient on 7/31/2018   If you checked off any problems, how difficult have these problems made it for you to do your work, take care of things at home, or get along with other people?: Not difficult at all  PHQ9 TOTAL SCORE: 0  CHITRA 7 TOTAL SCORE: 0    Concern - lump on head  Onset: several years    Description:   Lump on left side of skull that is growing    Intensity: moderate    Progression of Symptoms:  worsening    Accompanying Signs & Symptoms:  Tenderness to touch    Previous history of similar problem:   yes    Precipitating factors:   Worsened by: none    Alleviating factors:  Improved by: none    Therapies Tried and outcome: none   Problem list and histories reviewed & adjusted, as indicated.  Additional history: as documented    Patient Active Problem List   Diagnosis     Polycystic ovaries     Endometriosis of uterus     Hyperlipidemia, unspecified     Major depressive disorder, recurrent episode, moderate (H)     Anxiety     Morbid obesity (H)     IUD (intrauterine device) in place     Past Surgical History:   Procedure Laterality Date     ARTHROSCOPY SHOULDER BICEPS TENODESIS REPAIR Right 4/8/2016    Procedure: ARTHROSCOPY SHOULDER BICEPS TENODESIS REPAIR;  Surgeon: Gilbert Cortes DO;   [facility]     ARTHROSCOPY SHOULDER DECOMPRESSION Right 4/8/2016    Procedure: ARTHROSCOPY SHOULDER DECOMPRESSION;  Surgeon: Gilbert Cortes DO;  [facility]     ARTHROSCOPY SHOULDER DISTAL CLAVICLE REPAIR Right 4/8/2016    Procedure: ARTHROSCOPY SHOULDER DISTAL CLAVICLE RESECTION;  Surgeon: Gilbert Cortes DO;  [facility]     ARTHROSCOPY SHOULDER, OPEN ROTATOR CUFF REPAIR, COMBINED Right 4/8/2016    Procedure: COMBINED ARTHROSCOPY SHOULDER, OPEN ROTATOR CUFF REPAIR;  Surgeon: Gilbert Cortes DO;  [facility]     BUNIONECTOMY  10/24/2013    Procedure: BUNIONECTOMY;;  Surgeon: Ariel Mcdermott DPM;  [facility]     C LIGATE FALLOPIAN TUBE,POSTPARTUM  1998    Tubal Ligation     C NONSPECIFIC PROCEDURE  2002    kidney stones removed     C NONSPECIFIC PROCEDURE  060503    lasik surgery     C STRESS ECHO TEST NL  11/2005    neg (low probability of ischemic disease)     EXCISE EXOSTOSIS FOOT  10/24/2013    Procedure: EXCISE EXOSTOSIS FOOT;  left excision of accessory navicular, bunion repair with osteotomy, and navicular remodeling;  Surgeon: Ariel Mcdermott DPM;  Location: [facility]     EYE SURGERY       HC RAD RX IODINE 123 SODIUM IODIDE  [facility], UP  [facility]  10/2005    I 123 thryoid -WNL     HEAD & NECK SURGERY      Why is this one highlighted on yes already??     OSTEOTOMY FOOT  10/24/2013    Procedure: OSTEOTOMY FOOT;;  Surgeon: Ariel Mcdermott DPM;  Location: [facility]       Social History   Substance Use Topics     Smoking status: Former Smoker     Packs/day: 0.10     Years: 18.00     Types: Cigarettes     Quit date: 11/7/2014     Smokeless tobacco: Never Used      Comment: since 2007     Alcohol use Yes      Comment: weekends <2-4 drinks     Family History   Problem Relation Age of Onset     Adopted: Yes     Unknown/Adopted Mother      Unknown/Adopted Father      Unknown/Adopted Maternal Grandmother      Unknown/Adopted Maternal Grandfather       Unknown/Adopted Paternal Grandmother      Unknown/Adopted Paternal Grandfather            ROS:  CONSTITUTIONAL: NEGATIVE for fever, chills, change in weight  ENT/MOUTH: NEGATIVE for ear, mouth and throat problems  RESP: NEGATIVE for significant cough or SOB  CV: NEGATIVE for chest pain, palpitations or peripheral edema    OBJECTIVE:     /76 (BP Location: Right arm, Patient Position: Chair, Cuff Size: Adult Large)  Pulse 72  Temp 97.6  F (36.4  C) (Temporal)  Resp 16  Wt 219 lb (99.3 kg)  SpO2 98%  BMI 38.79 kg/m2  Body mass index is 38.79 kg/(m^2).  Gen: no apparent distress  Head:  Top of head there is a 1.5 cm subcutaneous mass consistent with cyst.  No overlying skin disruption or rash.  No tenderness.  Psych: Alert and oriented times 3; coherent speech, normal   rate and volume, able to articulate logical thoughts, able   to abstract reason, no tangential thoughts, no hallucinations   or delusions  Her affect is neutral.    ASSESSMENT/PLAN:     1. Cyst of skin  Suspect pilar cyst.  It is growing and bothering her.  She would like this removed.  Will schedule for removal in the office.      Merlene Paz MD  [facility]

## 2018-07-29 ASSESSMENT — ANXIETY QUESTIONNAIRES
1. FEELING NERVOUS, ANXIOUS, OR ON EDGE: NOT AT ALL
6. BECOMING EASILY ANNOYED OR IRRITABLE: NOT AT ALL
4. TROUBLE RELAXING: NOT AT ALL
7. FEELING AFRAID AS IF SOMETHING AWFUL MIGHT HAPPEN: NOT AT ALL
7. FEELING AFRAID AS IF SOMETHING AWFUL MIGHT HAPPEN: NOT AT ALL
5. BEING SO RESTLESS THAT IT IS HARD TO SIT STILL: NOT AT ALL
2. NOT BEING ABLE TO STOP OR CONTROL WORRYING: NOT AT ALL
GAD7 TOTAL SCORE: 0
3. WORRYING TOO MUCH ABOUT DIFFERENT THINGS: NOT AT ALL
GAD7 TOTAL SCORE: 0

## 2018-07-29 ASSESSMENT — PATIENT HEALTH QUESTIONNAIRE - PHQ9: SUM OF ALL RESPONSES TO PHQ QUESTIONS 1-9: 0

## 2018-07-30 ASSESSMENT — ANXIETY QUESTIONNAIRES
5. BEING SO RESTLESS THAT IT IS HARD TO SIT STILL: NOT AT ALL
3. WORRYING TOO MUCH ABOUT DIFFERENT THINGS: NOT AT ALL
1. FEELING NERVOUS, ANXIOUS, OR ON EDGE: NOT AT ALL
7. FEELING AFRAID AS IF SOMETHING AWFUL MIGHT HAPPEN: NOT AT ALL
GAD7 TOTAL SCORE: 0
6. BECOMING EASILY ANNOYED OR IRRITABLE: NOT AT ALL
2. NOT BEING ABLE TO STOP OR CONTROL WORRYING: NOT AT ALL
4. TROUBLE RELAXING: NOT AT ALL
GAD7 TOTAL SCORE: 0

## 2018-07-30 ASSESSMENT — PATIENT HEALTH QUESTIONNAIRE - PHQ9
SUM OF ALL RESPONSES TO PHQ QUESTIONS 1-9: 0
SUM OF ALL RESPONSES TO PHQ QUESTIONS 1-9: 0

## 2018-07-31 ENCOUNTER — OFFICE VISIT (OUTPATIENT)
Dept: FAMILY MEDICINE | Facility: OTHER | Age: 48
End: 2018-07-31
Payer: COMMERCIAL

## 2018-07-31 VITALS
HEART RATE: 72 BPM | SYSTOLIC BLOOD PRESSURE: 118 MMHG | TEMPERATURE: 97.6 F | DIASTOLIC BLOOD PRESSURE: 76 MMHG | BODY MASS INDEX: 38.79 KG/M2 | RESPIRATION RATE: 16 BRPM | OXYGEN SATURATION: 98 % | WEIGHT: 219 LBS

## 2018-07-31 DIAGNOSIS — L72.9 CYST OF SKIN: Primary | ICD-10-CM

## 2018-07-31 PROCEDURE — 99213 OFFICE O/P EST LOW 20 MIN: CPT | Performed by: FAMILY MEDICINE

## 2018-07-31 ASSESSMENT — ANXIETY QUESTIONNAIRES
3. WORRYING TOO MUCH ABOUT DIFFERENT THINGS: NOT AT ALL
6. BECOMING EASILY ANNOYED OR IRRITABLE: NOT AT ALL
4. TROUBLE RELAXING: NOT AT ALL
7. FEELING AFRAID AS IF SOMETHING AWFUL MIGHT HAPPEN: NOT AT ALL
GAD7 TOTAL SCORE: 0
5. BEING SO RESTLESS THAT IT IS HARD TO SIT STILL: NOT AT ALL
1. FEELING NERVOUS, ANXIOUS, OR ON EDGE: NOT AT ALL
2. NOT BEING ABLE TO STOP OR CONTROL WORRYING: NOT AT ALL
GAD7 TOTAL SCORE: 0

## 2018-07-31 ASSESSMENT — PATIENT HEALTH QUESTIONNAIRE - PHQ9
SUM OF ALL RESPONSES TO PHQ QUESTIONS 1-9: 0
10. IF YOU CHECKED OFF ANY PROBLEMS, HOW DIFFICULT HAVE THESE PROBLEMS MADE IT FOR YOU TO DO YOUR WORK, TAKE CARE OF THINGS AT HOME, OR GET ALONG WITH OTHER PEOPLE: NOT DIFFICULT AT ALL
SUM OF ALL RESPONSES TO PHQ QUESTIONS 1-9: 0

## 2018-07-31 NOTE — MR AVS SNAPSHOT
After Visit Summary   7/31/2018    Hannah Krishna    MRN: [id number]           Patient Information     Date Of Birth          1970        Visit Information        Provider Department      7/31/2018 7:00 AM Merlene Paz MD [facility]        Today's Diagnoses     Cyst of skin    -  1       Follow-ups after your visit        Your next 10 appointments already scheduled     Aug 10, 2018  9:20 AM CDT   Office Visit with Merlene Paz MD   [facility] ([facility])    [address]  [facility] [address]   [phone]           Bring a current list of meds and any records pertaining to this visit. For Physicals, please bring immunization records and any forms needing to be filled out. Please arrive 10 minutes early to complete paperwork.              Who to contact     If you have questions or need follow up information about today's clinic visit or your schedule please contact [facility] directly at [phone].  Normal or non-critical lab and imaging results will be communicated to you by Wave Technology Solutionshart, letter or phone within 4 business days after the clinic has received the results. If you do not hear from us within 7 days, please contact the clinic through QA on Requestt or phone. If you have a critical or abnormal lab result, we will notify you by phone as soon as possible.  Submit refill requests through Sisteer or call your pharmacy and they will forward the refill request to us. Please allow 3 business days for your refill to be completed.          Additional Information About Your Visit        Wave Technology Solutionshart Information     Sisteer gives you secure access to your electronic health record. If you see a primary care provider, you can also send messages to your care team and make appointments. If you have questions, please call your primary care clinic.  If you do not have a primary care provider, please call [phone] and  they will assist you.        Care EveryWhere ID     This is your Care EveryWhere ID. This could be used by other organizations to access your [place] medical records  [id number]        Your Vitals Were     Pulse Temperature Respirations Pulse Oximetry BMI (Body Mass Index)       72 97.6  F (36.4  C) (Temporal) 16 98% 38.79 kg/m2        Blood Pressure from Last 3 Encounters:   07/31/18 118/76   04/09/18 150/90   03/08/18 144/84    Weight from Last 3 Encounters:   07/31/18 219 lb (99.3 kg)   04/09/18 219 lb (99.3 kg)   03/08/18 219 lb 8 oz (99.6 kg)              Today, you had the following     No orders found for display       Primary Care Provider Office Phone # Fax #    Merlene JAGDEEP Paz -[phone]       [address]  [facility] [address]        Equal Access to Services     [facility] : Hadii paige camachoo Socarly, waaxda luqadaha, qaybta kaalmada adecolinyada, gurmeet brown . So [facility] [phone].    ATENCIÓN: Si habla español, tiene a manley disposición servicios gratuitos de asistencia lingüística. Llame al [phone].    We comply with applicable federal civil rights laws and [place] laws. We do not discriminate on the basis of race, color, national origin, age, disability, sex, sexual orientation, or gender identity.            Thank you!     Thank you for choosing [facility]  for your care. Our goal is always to provide you with excellent care. Hearing back from our patients is one way we can continue to improve our services. Please take a few minutes to complete the written survey that you may receive in the mail after your visit with us. Thank you!             Your Updated Medication List - Protect others around you: Learn how to safely use, store and throw away your medicines at www.disposemymeds.org.          This list is accurate as of 7/31/18  7:35 AM.  Always use your most recent med list.                   Brand Name Dispense  Instructions for use Diagnosis    buPROPion 300 MG 24 hr tablet    WELLBUTRIN XL    90 tablet    Take 1 tablet (300 mg) by mouth every morning    Major depressive disorder, recurrent episode, moderate (H)       escitalopram 10 MG tablet    LEXAPRO    90 tablet    Take 1 tablet (10 mg) by mouth daily    Anxiety, Major depressive disorder, recurrent episode, moderate (H)

## 2018-08-07 NOTE — PROGRESS NOTES
SUBJECTIVE:   Hannah Krishna is a 48 year old female who presents to clinic today for the following health issues:      HPI     Patient is here today for a mass removal from the skull.     SUBJECTIVE:  Hannah is a 48 year old female who presents to the clinic today for removal of a mass on the top of her scalp that has been growing over the last year.           The risks, complications, and alternatives to the procedure were  discussed with the patient today.  Questions were answered to her satisfaction.  Patient understands the possibility of infection, bleeding, or a poor cosmetic result.  Consent was signed.                PROCEDURE:         The patient was then placed in a reclined position. The lesion was injected locally with 2 cc's of Lidocaine 1% with epinephrine.  Good anesthesia was achieved.  The lesion prepped in the ususal sterile fashion.  It was then removed by excision using a #15 blade using sterile technique.   The resulting wound was closed with 7 4-0 Ethilon interrupted sutures.  Bacitracin was then applied.         Signs of infection were discussed.  The patient is to keep the wound clean and dry for the next 24-48 hours.  Sutures should be   removed in 10 days.  The specimen was sent to pathology.    Electronically signed by Merlene Paz MD on 8/10/2018     8/21/2018 Addendum:  Cyst about 1.5 cm in diameter, total excision 2 cm in diameter.    Electronically signed by Merlene Paz MD on 8/21/2018

## 2018-08-10 ENCOUNTER — OFFICE VISIT (OUTPATIENT)
Dept: FAMILY MEDICINE | Facility: OTHER | Age: 48
End: 2018-08-10
Payer: COMMERCIAL

## 2018-08-10 VITALS
WEIGHT: 221 LBS | DIASTOLIC BLOOD PRESSURE: 68 MMHG | SYSTOLIC BLOOD PRESSURE: 126 MMHG | RESPIRATION RATE: 16 BRPM | HEART RATE: 71 BPM | BODY MASS INDEX: 39.15 KG/M2 | OXYGEN SATURATION: 99 % | TEMPERATURE: 98.3 F

## 2018-08-10 DIAGNOSIS — L72.9 CYST OF SKIN: Primary | ICD-10-CM

## 2018-08-10 PROCEDURE — 88305 TISSUE EXAM BY PATHOLOGIST: CPT | Mod: TC | Performed by: FAMILY MEDICINE

## 2018-08-10 PROCEDURE — 99207 ZZC DROP WITH A PROCEDURE: CPT | Mod: 25 | Performed by: FAMILY MEDICINE

## 2018-08-10 PROCEDURE — 11422 EXC H-F-NK-SP B9+MARG 1.1-2: CPT | Performed by: FAMILY MEDICINE

## 2018-08-10 NOTE — MR AVS SNAPSHOT
After Visit Summary   8/10/2018    Hannah Krishna    MRN: [id number]           Patient Information     Date Of Birth          1970        Visit Information        Provider Department      8/10/2018 9:20 AM Merlene Paz MD [facility]        Today's Diagnoses     Cyst of skin    -  1       Follow-ups after your visit        Your next 10 appointments already scheduled     Aug 20, 2018  9:30 AM CDT   Nurse Only with NL RN TEAM A, MERVIN   [facility]    [address]  [facility] [id number] [phone]              Who to contact     If you have questions or need follow up information about today's clinic visit or your schedule please contact [facility] directly at [phone].  Normal or non-critical lab and imaging results will be communicated to you by MyChart, letter or phone within 4 business days after the clinic has received the results. If you do not hear from us within 7 days, please contact the clinic through MyChart or phone. If you have a critical or abnormal lab result, we will notify you by phone as soon as possible.  Submit refill requests through Hubkick or call your pharmacy and they will forward the refill request to us. Please allow 3 business days for your refill to be completed.          Additional Information About Your Visit        MyChart Information     Hubkick gives you secure access to your electronic health record. If you see a primary care provider, you can also send messages to your care team and make appointments. If you have questions, please call your primary care clinic.  If you do not have a primary care provider, please call [phone] and they will assist you.        Care EveryWhere ID     This is your Care EveryWhere ID. This could be used by other organizations to access your [place] medical records  [id number]        Your Vitals Were     Pulse Temperature  Respirations Pulse Oximetry BMI (Body Mass Index)       71 98.3  F (36.8  C) 16 99% 39.15 kg/m2        Blood Pressure from Last 3 Encounters:   08/10/18 126/68   07/31/18 118/76   04/09/18 150/90    Weight from Last 3 Encounters:   08/10/18 221 lb (100.2 kg)   07/31/18 219 lb (99.3 kg)   04/09/18 219 lb (99.3 kg)              We Performed the Following     1.1-2CM REMAINDR BODY     Dermatological path order and indications        Primary Care Provider Office Phone # Fax #    Merlene JAGDEEP Paz [phone]       [address]  [facility] [address]        Equal Access to Services     LONNY FARIAS : Beny camachoo Solazaraali, waaxda luqadaha, qaybta kaalmada adeegyada, gurmeet brown . So [facility] [phone].    ATENCIÓN: Si habla español, tiene a manley disposición servicios gratuitos de asistencia lingüística. [facility] [phone].    We comply with applicable federal civil rights laws and [place] laws. We do not discriminate on the basis of race, color, national origin, age, disability, sex, sexual orientation, or gender identity.            Thank you!     Thank you for choosing [facility]  for your care. Our goal is always to provide you with excellent care. Hearing back from our patients is one way we can continue to improve our services. Please take a few minutes to complete the written survey that you may receive in the mail after your visit with us. Thank you!             Your Updated Medication List - Protect others around you: Learn how to safely use, store and throw away your medicines at www.disposemymeds.org.          This list is accurate as of 8/10/18 10:18 AM.  Always use your most recent med list.                   Brand Name Dispense Instructions for use Diagnosis    buPROPion 300 MG 24 hr tablet    WELLBUTRIN XL    90 tablet    Take 1 tablet (300 mg) by mouth every morning    Major depressive disorder, recurrent episode, moderate  (H)       escitalopram 10 MG tablet    LEXAPRO    90 tablet    Take 1 tablet (10 mg) by mouth daily    Anxiety, Major depressive disorder, recurrent episode, moderate (H)

## 2018-08-14 LAB — COPATH REPORT: NORMAL

## 2018-08-20 ENCOUNTER — ALLIED HEALTH/NURSE VISIT (OUTPATIENT)
Dept: FAMILY MEDICINE | Facility: OTHER | Age: 48
End: 2018-08-20
Payer: COMMERCIAL

## 2018-08-20 DIAGNOSIS — Z48.02 ENCOUNTER FOR REMOVAL OF SUTURES: Primary | ICD-10-CM

## 2018-08-20 PROCEDURE — 99207 ZZC NO CHARGE NURSE ONLY: CPT

## 2018-08-20 NOTE — MR AVS SNAPSHOT
After Visit Summary   8/20/2018    Hannah Krishna    MRN: [id number]           Patient Information     Date Of Birth          1970        Visit Information        Provider Department      8/20/2018 9:30 AM NL RN TEAM A, MERVIN [facility]        Today's Diagnoses     Encounter for removal of sutures    -  1       Follow-ups after your visit        Who to contact     If you have questions or need follow up information about today's clinic visit or your schedule please contact [facility] directly at [phone].  Normal or non-critical lab and imaging results will be communicated to you by HundredAppleshart, letter or phone within 4 business days after the clinic has received the results. If you do not hear from us within 7 days, please contact the clinic through Source MDxt or phone. If you have a critical or abnormal lab result, we will notify you by phone as soon as possible.  Submit refill requests through NONO or call your pharmacy and they will forward the refill request to us. Please allow 3 business days for your refill to be completed.          Additional Information About Your Visit        MyChart Information     NONO gives you secure access to your electronic health record. If you see a primary care provider, you can also send messages to your care team and make appointments. If you have questions, please call your primary care clinic.  If you do not have a primary care provider, please call [phone] and they will assist you.        Care EveryWhere ID     This is your Care EveryWhere ID. This could be used by other organizations to access your [place] medical records  [id number]         Blood Pressure from Last 3 Encounters:   08/10/18 126/68   07/31/18 118/76   04/09/18 150/90    Weight from Last 3 Encounters:   08/10/18 221 lb (100.2 kg)   07/31/18 219 lb (99.3 kg)   04/09/18 219 lb (99.3 kg)              Today, you had the following     No orders found for  display       Primary Care Provider Office Phone # Fax #    Merlene GANNON MD [facility] [phone]       [address] [place] [address] [address]  [facility] [address]        Equal Access to Services     LONNY FARIAS : Hadii aad ku hadkaminio Solazaraali, waaxda luqadaha, qaybta kaalmada ademallika, gurmeet rjin hayaapat chappelljesicadarlene vega. So [facility] [phone].    ATENCIÓN: Si habla español, tiene a manley disposición servicios gratuitos de asistencia lingüística. Llame al [phone].    We comply with applicable federal civil rights laws and [place] laws. We do not discriminate on the basis of race, color, national origin, age, disability, sex, sexual orientation, or gender identity.            Thank you!     Thank you for choosing [facility]  for your care. Our goal is always to provide you with excellent care. Hearing back from our patients is one way we can continue to improve our services. Please take a few minutes to complete the written survey that you may receive in the mail after your visit with us. Thank you!             Your Updated Medication List - Protect others around you: Learn how to safely use, store and throw away your medicines at www.disposemymeds.org.          This list is accurate as of 8/20/18  9:33 AM.  Always use your most recent med list.                   Brand Name Dispense Instructions for use Diagnosis    buPROPion 300 MG 24 hr tablet    WELLBUTRIN XL    90 tablet    Take 1 tablet (300 mg) by mouth every morning    Major depressive disorder, recurrent episode, moderate (H)       escitalopram 10 MG tablet    LEXAPRO    90 tablet    Take 1 tablet (10 mg) by mouth daily    Anxiety, Major depressive disorder, recurrent episode, moderate (H)

## 2018-08-20 NOTE — PROGRESS NOTES
Hannah Krishna presents to the clinic for removal of sutures. The patient has had sutures in place for 10 days. There has been no patient reported signs or symptoms of infection or drainage. 7  sutures are seen and located on the head. Tetanus status is up to date. All sutures were easily removed today. Routine wound care discussed by the RN or provider. The patient will follow up as needed.    Joy Ashley, RN, BSN

## 2018-11-27 NOTE — PROGRESS NOTES
SUBJECTIVE:   Hannah Krishna is a 48 year old female who presents to clinic today for the following health issues:    History of Present Illness     Diet:  Regular (no restrictions)  Frequency of exercise:  2-3 days/week  Duration of exercise:  15-30 minutes  Taking medications regularly:  Yes  Medication side effects:  None  Additional concerns today:  No    Concern - mass  Onset: a couple of months    Description:   Mass on top of right foot    Intensity: moderate    Progression of Symptoms:  intermittent    Accompanying Signs & Symptoms:  tenderness    Previous history of similar problem:   none    Precipitating factors:   Worsened by: rubbing on shoe    Alleviating factors:  Improved by: none    Therapies Tried and outcome: none   Problem list and histories reviewed & adjusted, as indicated.  Additional history: as documented    Patient Active Problem List   Diagnosis     Polycystic ovaries     Endometriosis of uterus     Hyperlipidemia, unspecified     Major depressive disorder, recurrent episode, moderate (H)     Anxiety     Morbid obesity (H)     IUD (intrauterine device) in place     Past Surgical History:   Procedure Laterality Date     ARTHROSCOPY SHOULDER BICEPS TENODESIS REPAIR Right 4/8/2016    Procedure: ARTHROSCOPY SHOULDER BICEPS TENODESIS REPAIR;  Surgeon: Gilbert Cortes DO;  Location: [facility]     ARTHROSCOPY SHOULDER DECOMPRESSION Right 4/8/2016    Procedure: ARTHROSCOPY SHOULDER DECOMPRESSION;  Surgeon: Gilbert Cortes DO;  Location: [facility]     ARTHROSCOPY SHOULDER DISTAL CLAVICLE REPAIR Right 4/8/2016    Procedure: ARTHROSCOPY SHOULDER DISTAL CLAVICLE RESECTION;  Surgeon: Gilbert Cortes DO;  Location: [facility]     ARTHROSCOPY SHOULDER, OPEN ROTATOR CUFF REPAIR, COMBINED Right 4/8/2016    Procedure: COMBINED ARTHROSCOPY SHOULDER, OPEN ROTATOR CUFF REPAIR;  Surgeon: Gilbert Cortes DO;  Location: [facility]     BUNIONECTOMY  10/24/2013    Procedure: BUNIONECTOMY;;   Surgeon: Ariel Mcdermott DPM;  Location: [facility] OR     C LIGATE FALLOPIAN TUBE,POSTPARTUM  1998    Tubal Ligation     C NONSPECIFIC PROCEDURE  2002    kidney stones removed     C NONSPECIFIC PROCEDURE  988523    lasik surgery     C STRESS ECHO TEST NL  11/2005    neg (low probability of ischemic disease)     EXCISE EXOSTOSIS FOOT  10/24/2013    Procedure: EXCISE EXOSTOSIS FOOT;  left excision of accessory navicular, bunion repair with osteotomy, and navicular remodeling;  Surgeon: Ariel Mcdermott DPM;  Location: [facility] OR     EYE SURGERY       HC RAD RX IODINE 123 SODIUM IODIDE  UCI, UP  UCI  10/2005    I 123 thryoid -WNL     HEAD & NECK SURGERY      Why is this one highlighted on yes already??     OSTEOTOMY FOOT  10/24/2013    Procedure: OSTEOTOMY FOOT;;  Surgeon: Ariel Mcdermott DPM;  Location:  OR       Social History   Substance Use Topics     Smoking status: Former Smoker     Packs/day: 0.10     Years: 18.00     Types: Cigarettes     Quit date: 11/7/2014     Smokeless tobacco: Never Used      Comment: since 2007     Alcohol use Yes      Comment: weekends <2-4 drinks     Family History   Problem Relation Age of Onset     Adopted: Yes     Unknown/Adopted Mother      Unknown/Adopted Father      Unknown/Adopted Maternal Grandmother      Unknown/Adopted Maternal Grandfather      Unknown/Adopted Paternal Grandmother      Unknown/Adopted Paternal Grandfather            ROS:  CONSTITUTIONAL: NEGATIVE for fever, chills, change in weight  RESP: NEGATIVE for significant cough or shortness of breath   CV: NEGATIVE for chest pain, palpitations or peripheral edema    OBJECTIVE:     /86 (BP Location: Right arm, Patient Position: Chair, Cuff Size: Adult Large)  Pulse 74  Temp 97  F (36.1  C) (Temporal)  Resp 14  Wt 217 lb (98.4 kg)  SpO2 98%  BMI 38.44 kg/m2  Body mass index is 38.44 kg/(m^2).  Gen: no apparent distress  Right foot:  Mid foot there is a 7 mm subcutaneous mass, soft, no  erythema, no tenderness  Psych: Alert and oriented times 3; coherent speech, normal   rate and volume, able to articulate logical thoughts, able   to abstract reason, no tangential thoughts, no hallucinations   or delusions  Her affect is neutral    ASSESSMENT/PLAN:     1. Ganglion cyst of foot  Currently not giving her any problems, states gets discomfort intermittently.  Found that her discomfort improved when she bought shoes that aren't tight on the top of her foot.  Offered Podiatry, she declines.    Merlene Paz MD  [facility]

## 2018-11-30 ENCOUNTER — OFFICE VISIT (OUTPATIENT)
Dept: FAMILY MEDICINE | Facility: OTHER | Age: 48
End: 2018-11-30
Payer: COMMERCIAL

## 2018-11-30 VITALS
HEART RATE: 74 BPM | DIASTOLIC BLOOD PRESSURE: 86 MMHG | BODY MASS INDEX: 38.44 KG/M2 | RESPIRATION RATE: 14 BRPM | TEMPERATURE: 97 F | WEIGHT: 217 LBS | SYSTOLIC BLOOD PRESSURE: 132 MMHG | OXYGEN SATURATION: 98 %

## 2018-11-30 DIAGNOSIS — M67.479 GANGLION CYST OF FOOT: Primary | ICD-10-CM

## 2018-11-30 PROCEDURE — 99213 OFFICE O/P EST LOW 20 MIN: CPT | Performed by: FAMILY MEDICINE

## 2018-11-30 NOTE — MR AVS SNAPSHOT
After Visit Summary   11/30/2018    Hannah Krishna    MRN: [id number]           Patient Information     Date Of Birth          1970        Visit Information        Provider Department      11/30/2018 1:20 PM Merlene Paz MD [facility]        Today's Diagnoses     Ganglion cyst of foot    -  1       Follow-ups after your visit        Who to contact     If you have questions or need follow up information about today's clinic visit or your schedule please contact [facility] directly at [phone].  Normal or non-critical lab and imaging results will be communicated to you by Anthillzhart, letter or phone within 4 business days after the clinic has received the results. If you do not hear from us within 7 days, please contact the clinic through Afoundriat or phone. If you have a critical or abnormal lab result, we will notify you by phone as soon as possible.  Submit refill requests through "[employer] or call your pharmacy and they will forward the refill request to us. Please allow 3 business days for your refill to be completed.          Additional Information About Your Visit        MyChart Information     "[employer] gives you secure access to your electronic health record. If you see a primary care provider, you can also send messages to your care team and make appointments. If you have questions, please call your primary care clinic.  If you do not have a primary care provider, please call [phone] and they will assist you.        Care EveryWhere ID     This is your Care EveryWhere ID. This could be used by other organizations to access your [place] medical records  [id number]        Your Vitals Were     Pulse Temperature Respirations Pulse Oximetry BMI (Body Mass Index)       74 97  F (36.1  C) (Temporal) 14 98% 38.44 kg/m2        Blood Pressure from Last 3 Encounters:   11/30/18 132/86   08/10/18 126/68   07/31/18 118/76    Weight from Last 3 Encounters:    11/30/18 217 lb (98.4 kg)   08/10/18 221 lb (100.2 kg)   07/31/18 219 lb (99.3 kg)              Today, you had the following     No orders found for display       Primary Care Provider Office Phone # Fax #    Merlene GANNON [facility] [phone]       [address]  [facility] [address]        Equal Access to Services     [facility] : Hadii aad ku hadasho Soomaali, waaxda luqadaha, qaybta kaalmada adeegyada, waxay rjin hayaan adeeg mistijesicadarlene brown . So [facility] [phone].    ATENCIÓN: Si habla espifrah, tiene a manley disposición servicios gratuitos de asistencia lingüística. Llame al [phone].    We comply with applicable federal civil rights laws and [place] laws. We do not discriminate on the basis of race, color, national origin, age, disability, sex, sexual orientation, or gender identity.            Thank you!     Thank you for choosing [facility]  for your care. Our goal is always to provide you with excellent care. Hearing back from our patients is one way we can continue to improve our services. Please take a few minutes to complete the written survey that you may receive in the mail after your visit with us. Thank you!             Your Updated Medication List - Protect others around you: Learn how to safely use, store and throw away your medicines at www.disposemymeds.org.          This list is accurate as of 11/30/18  1:28 PM.  Always use your most recent med list.                   Brand Name Dispense Instructions for use Diagnosis    buPROPion 300 MG 24 hr tablet    WELLBUTRIN XL    90 tablet    Take 1 tablet (300 mg) by mouth every morning    Major depressive disorder, recurrent episode, moderate (H)       escitalopram 10 MG tablet    LEXAPRO    90 tablet    Take 1 tablet (10 mg) by mouth daily    Anxiety, Major depressive disorder, recurrent episode, moderate (H)

## 2018-12-11 ENCOUNTER — MYC MEDICAL ADVICE (OUTPATIENT)
Dept: FAMILY MEDICINE | Facility: OTHER | Age: 48
End: 2018-12-11

## 2019-01-18 ENCOUNTER — OFFICE VISIT (OUTPATIENT)
Dept: FAMILY MEDICINE | Facility: OTHER | Age: 49
End: 2019-01-18
Payer: COMMERCIAL

## 2019-01-18 VITALS
SYSTOLIC BLOOD PRESSURE: 126 MMHG | WEIGHT: 213 LBS | DIASTOLIC BLOOD PRESSURE: 84 MMHG | RESPIRATION RATE: 14 BRPM | HEIGHT: 64 IN | TEMPERATURE: 98.5 F | BODY MASS INDEX: 36.37 KG/M2 | HEART RATE: 70 BPM

## 2019-01-18 DIAGNOSIS — S46.911A STRAIN OF RIGHT SHOULDER, INITIAL ENCOUNTER: Primary | ICD-10-CM

## 2019-01-18 PROCEDURE — 99213 OFFICE O/P EST LOW 20 MIN: CPT | Performed by: PHYSICIAN ASSISTANT

## 2019-01-18 RX ORDER — CYCLOBENZAPRINE HCL 5 MG
5-10 TABLET ORAL 3 TIMES DAILY PRN
Qty: 60 TABLET | Refills: 1 | Status: SHIPPED | OUTPATIENT
Start: 2019-01-18 | End: 2019-01-28

## 2019-01-18 ASSESSMENT — PAIN SCALES - GENERAL: PAINLEVEL: MILD PAIN (3)

## 2019-01-18 ASSESSMENT — ANXIETY QUESTIONNAIRES
4. TROUBLE RELAXING: NOT AT ALL
5. BEING SO RESTLESS THAT IT IS HARD TO SIT STILL: NOT AT ALL
7. FEELING AFRAID AS IF SOMETHING AWFUL MIGHT HAPPEN: NOT AT ALL
GAD7 TOTAL SCORE: 0
GAD7 TOTAL SCORE: 0
2. NOT BEING ABLE TO STOP OR CONTROL WORRYING: NOT AT ALL
1. FEELING NERVOUS, ANXIOUS, OR ON EDGE: NOT AT ALL
6. BECOMING EASILY ANNOYED OR IRRITABLE: NOT AT ALL
3. WORRYING TOO MUCH ABOUT DIFFERENT THINGS: NOT AT ALL
GAD7 TOTAL SCORE: 0

## 2019-01-18 ASSESSMENT — MIFFLIN-ST. JEOR: SCORE: 1586.16

## 2019-01-18 NOTE — PROGRESS NOTES
SUBJECTIVE:   Hannah Krishna is a 48 year old female who presents to clinic today for the following health issues:      HPI  Right arm pain    Onset: 6 weeks    Description:   Location: right shoulder right arm  Character: Dull ache    Intensity: mild    Progression of Symptoms: intermittent    Accompanying Signs & Symptoms:  Other symptoms: radiation of pain to right elbow    History:   Previous similar pain: no       Precipitating factors:   Trauma or overuse: no     Alleviating factors:  Improved by: heat, acetaminophen and Ibuprofen  Therapies Tried and outcome: ibuprofen    Patient presents today for evaluation of right neck/shoulder/upper arm pain. Patient reports symptoms started 6 weeks ago. She denies any acute injury. She reports the pain starts on the posterior aspect of her neck and radiates down into shoulder. Has a constant aching pain in her right upper arm. Still has full ROM of shoulder and no pain with this. Arm sometimes feels weak. She saw the chiropractor which helped a little bit.     Problem list and histories reviewed & adjusted, as indicated.  Additional history: as documented    Patient Active Problem List   Diagnosis     Polycystic ovaries     Endometriosis of uterus     Hyperlipidemia, unspecified     Major depressive disorder, recurrent episode, moderate (H)     Anxiety     Morbid obesity (H)     IUD (intrauterine device) in place     Past Surgical History:   Procedure Laterality Date     ARTHROSCOPY SHOULDER BICEPS TENODESIS REPAIR Right 4/8/2016    Procedure: ARTHROSCOPY SHOULDER BICEPS TENODESIS REPAIR;  Surgeon: Gilbert Cortes DO;  [facility]     ARTHROSCOPY SHOULDER DECOMPRESSION Right 4/8/2016    Procedure: ARTHROSCOPY SHOULDER DECOMPRESSION;  Surgeon: Gilbert Cortes DO;  [facility]     ARTHROSCOPY SHOULDER DISTAL CLAVICLE REPAIR Right 4/8/2016    Procedure: ARTHROSCOPY SHOULDER DISTAL CLAVICLE RESECTION;  Surgeon: Gilbert Cortes DO;   [facility]     ARTHROSCOPY SHOULDER, OPEN ROTATOR CUFF REPAIR, COMBINED Right 2016    Procedure: COMBINED ARTHROSCOPY SHOULDER, OPEN ROTATOR CUFF REPAIR;  Surgeon: Gilbert Cortes DO;  [facility]     BUNIONECTOMY  10/24/2013    Procedure: BUNIONECTOMY;;  Surgeon: Ariel Mcdermott DPM;  [facility]     C LIGATE FALLOPIAN TUBE,POSTPARTUM  1998    Tubal Ligation     C NONSPECIFIC PROCEDURE      kidney stones removed     C NONSPECIFIC PROCEDURE      lasik surgery     C STRESS ECHO TEST NL  2005    neg (low probability of ischemic disease)     EXCISE EXOSTOSIS FOOT  10/24/2013    Procedure: EXCISE EXOSTOSIS FOOT;  left excision of accessory navicular, bunion repair with osteotomy, and navicular remodeling;  Surgeon: Ariel Mcdermott DPM;  [facility]     EYE SURGERY       HC RAD RX IODINE 123 SODIUM IODIDE  UCI, UP  UCI  10/2005    I 123 thryoid -WNL     HEAD & NECK SURGERY      Why is this one highlighted on yes already??     OSTEOTOMY FOOT  10/24/2013    Procedure: OSTEOTOMY FOOT;;  Surgeon: Ariel Mcdermott DPM;  [facility]       Social History     Tobacco Use     Smoking status: Former Smoker     Packs/day: 0.10     Years: 18.00     Pack years: 1.80     Types: Cigarettes     Last attempt to quit: 2014     Years since quittin.2     Smokeless tobacco: Never Used     Tobacco comment: since    Substance Use Topics     Alcohol use: Yes     Comment: weekends <2-4 drinks     Family History   Adopted: Yes   Problem Relation Age of Onset     Unknown/Adopted Mother      Unknown/Adopted Father      Unknown/Adopted Maternal Grandmother      Unknown/Adopted Maternal Grandfather      Unknown/Adopted Paternal Grandmother      Unknown/Adopted Paternal Grandfather          Current Outpatient Medications   Medication Sig Dispense Refill     buPROPion (WELLBUTRIN XL) 300 MG 24 hr tablet Take 1 tablet (300 mg) by mouth every morning 90 tablet 3      "cyclobenzaprine (FLEXERIL) 5 MG tablet Take 1-2 tablets (5-10 mg) by mouth 3 times daily as needed for muscle spasms 60 tablet 1     escitalopram (LEXAPRO) 10 MG tablet Take 1 tablet (10 mg) by mouth daily 90 tablet 3     Allergies   Allergen Reactions     No Known Drug Allergies      BP Readings from Last 3 Encounters:   01/18/19 126/84   11/30/18 132/86   08/10/18 126/68    Wt Readings from Last 3 Encounters:   01/18/19 96.6 kg (213 lb)   11/30/18 98.4 kg (217 lb)   08/10/18 100.2 kg (221 lb)            ROS:  Constitutional, HEENT, cardiovascular, pulmonary, gi and gu systems are negative, except as otherwise noted.    OBJECTIVE:     /84   Pulse 70   Temp 98.5  F (36.9  C) (Temporal)   Resp 14   Ht 1.626 m (5' 4\")   Wt 96.6 kg (213 lb)   BMI 36.56 kg/m    Body mass index is 36.56 kg/m .  GENERAL: healthy, alert and no distress  EYES: Eyes grossly normal to inspection, PERRL and conjunctivae and sclerae normal  HENT: ear canals and TM's normal, nose and mouth without ulcers or lesions  NECK: no adenopathy, no asymmetry, masses, or scars and thyroid normal to palpation  RESP: lungs clear to auscultation - no rales, rhonchi or wheezes  CV: regular rate and rhythm, normal S1 S2, no S3 or S4, no murmur, click or rub, no peripheral edema and peripheral pulses strong  MS: No obvious deformity of the right upper extremity. Tenderness over posterior neck extending into shoulder. Full ROM of shoulder without pain. Radial pulse intact.   NEURO: Normal strength and tone, mentation intact and speech normal  PSYCH: mentation appears normal, affect normal/bright    Diagnostic Test Results:  none     ASSESSMENT/PLAN:     1. Strain of right shoulder, initial encounter  Symptoms consistent with muscular strain. Will have patient start Flexeril TID as needed. Stretching exercises printed and provided to patient today. Encouraged rest, ice/heat and tylenol/ibuprofen as needed. OK to continue chiropractic care. Follow-up " in 1-2 weeks if symptoms fail to improve. May need to consider additional imaging or ortho referral at that time.   - cyclobenzaprine (FLEXERIL) 5 MG tablet; Take 1-2 tablets (5-10 mg) by mouth 3 times daily as needed for muscle spasms  Dispense: 60 tablet; Refill: 1    The patient indicates understanding of these issues and agrees with the plan.    Carolina Carmona PA-C  [facility]  Answers for HPI/ROS submitted by the patient on 1/18/2019   CHITRA 7 TOTAL SCORE: 0  If you checked off any problems, how difficult have these problems made it for you to do your work, take care of things at home, or get along with other people?: Not difficult at all  PHQ9 TOTAL SCORE: 0

## 2019-01-19 ASSESSMENT — ANXIETY QUESTIONNAIRES: GAD7 TOTAL SCORE: 0

## 2019-01-19 ASSESSMENT — PATIENT HEALTH QUESTIONNAIRE - PHQ9: SUM OF ALL RESPONSES TO PHQ QUESTIONS 1-9: 0

## 2019-03-13 DIAGNOSIS — F41.9 ANXIETY: ICD-10-CM

## 2019-03-13 DIAGNOSIS — F33.1 MAJOR DEPRESSIVE DISORDER, RECURRENT EPISODE, MODERATE (H): ICD-10-CM

## 2019-03-13 RX ORDER — ESCITALOPRAM OXALATE 10 MG/1
TABLET ORAL
Qty: 90 TABLET | Refills: 1 | Status: SHIPPED | OUTPATIENT
Start: 2019-03-13 | End: 2019-08-20

## 2019-03-13 RX ORDER — BUPROPION HYDROCHLORIDE 300 MG/1
TABLET ORAL
Qty: 90 TABLET | Refills: 1 | Status: SHIPPED | OUTPATIENT
Start: 2019-03-13 | End: 2019-08-20

## 2019-03-13 NOTE — TELEPHONE ENCOUNTER
escitalopram    Prescription approved per [facility] [place].    Bupropion    Prescription approved per [facility] [place] [facility].    Yadira Gardiner, RN, BSN

## 2019-04-19 ENCOUNTER — OFFICE VISIT (OUTPATIENT)
Dept: OBGYN | Facility: OTHER | Age: 49
End: 2019-04-19
Payer: COMMERCIAL

## 2019-04-19 VITALS
SYSTOLIC BLOOD PRESSURE: 142 MMHG | DIASTOLIC BLOOD PRESSURE: 88 MMHG | WEIGHT: 214.25 LBS | HEART RATE: 68 BPM | BODY MASS INDEX: 36.78 KG/M2

## 2019-04-19 DIAGNOSIS — Z12.31 VISIT FOR SCREENING MAMMOGRAM: ICD-10-CM

## 2019-04-19 DIAGNOSIS — N95.1 MENOPAUSAL SYNDROME (HOT FLASHES): Primary | ICD-10-CM

## 2019-04-19 PROCEDURE — 99214 OFFICE O/P EST MOD 30 MIN: CPT | Performed by: ADVANCED PRACTICE MIDWIFE

## 2019-04-19 RX ORDER — ESTRADIOL 0.03 MG/D
1 PATCH TRANSDERMAL WEEKLY
Qty: 12 PATCH | Refills: 0 | Status: SHIPPED | OUTPATIENT
Start: 2019-04-19 | End: 2019-08-20

## 2019-04-19 RX ORDER — CHOLECALCIFEROL (VITAMIN D3) 50 MCG
1 TABLET ORAL DAILY
COMMUNITY

## 2019-04-19 NOTE — PROGRESS NOTES
Hannah Krishna is a 49 year old who presents to the clinic for evaluation of hot flashes and night sweats.   Had a mirena placed about a year ago for menorrhagia and that is working well.  Still having periods and they are regular but the bleeding is manageable 3 days and light.   For about a year on and off she has had hot flashes about 3 a day.   Night sweats are far less often and happening a couple times a week.  Also has had a decrease in her libido  Has a history of HTN and has been off meds for about 2 years.       Histories reviewed and updated  Past Medical History:   Diagnosis Date     Chest pain, unspecified 11/2005    atypical - stress echo neg     Chronic depressive personality disorder      Esophageal reflux 12/11/2013     Headache 7/31/2013     Hypertension      Lateral epicondylitis 7/28/2015     Malaise and fatigue 8/1/2005     Obesity, unspecified      Other malaise and fatigue 8/2005     Polycystic ovaries     per pt     Sleep disturbance, unspecified 10/2005    see sleep study     Unspecified disorder of thyroid 9/2005    thyroid nodule. I-123 WNL.     Past Surgical History:   Procedure Laterality Date     ARTHROSCOPY SHOULDER BICEPS TENODESIS REPAIR Right 4/8/2016    Procedure: ARTHROSCOPY SHOULDER BICEPS TENODESIS REPAIR;  Surgeon: Gilbert Cortes DO;  [facility]     ARTHROSCOPY SHOULDER DECOMPRESSION Right 4/8/2016    Procedure: ARTHROSCOPY SHOULDER DECOMPRESSION;  Surgeon: Gilbert Cortes DO;  [facility]     ARTHROSCOPY SHOULDER DISTAL CLAVICLE REPAIR Right 4/8/2016    Procedure: ARTHROSCOPY SHOULDER DISTAL CLAVICLE RESECTION;  Surgeon: Gilbert Cortes DO;  [facility]     ARTHROSCOPY SHOULDER, OPEN ROTATOR CUFF REPAIR, COMBINED Right 4/8/2016    Procedure: COMBINED ARTHROSCOPY SHOULDER, OPEN ROTATOR CUFF REPAIR;  Surgeon: Gilbert Cortes DO;  [facility]     BUNIONECTOMY  10/24/2013    Procedure: BUNIONECTOMY;;  Surgeon: Baldemar  Ariel VACA DPM;  [facility]     C LIGATE FALLOPIAN TUBE,POSTPARTUM      Tubal Ligation     C NONSPECIFIC PROCEDURE      kidney stones removed     C NONSPECIFIC PROCEDURE  152106    lasik surgery     C STRESS ECHO TEST NL  2005    neg (low probability of ischemic disease)     EXCISE EXOSTOSIS FOOT  10/24/2013    Procedure: EXCISE EXOSTOSIS FOOT;  left excision of accessory navicular, bunion repair with osteotomy, and navicular remodeling;  Surgeon: Ariel Mcdermott DPM;  [facility]     EYE SURGERY       HC RAD RX IODINE 123 SODIUM IODIDE  UCI, UP  UCI  10/2005    I 123 thryoid -WNL     HEAD & NECK SURGERY      Why is this one highlighted on yes already??     OSTEOTOMY FOOT  10/24/2013    Procedure: OSTEOTOMY FOOT;;  Surgeon: Ariel Mcdermott DPM;  [facility]     Social History     Socioeconomic History     Marital status:      Spouse name: Not on file     Number of children: 2     Years of education: 12     Highest education level: Not on file   Occupational History     Occupation: [employer]     Employer: [employer]     Employer: [employer]   Social Needs     Financial resource strain: Not on file     Food insecurity:     Worry: Not on file     Inability: Not on file     Transportation needs:     Medical: Not on file     Non-medical: Not on file   Tobacco Use     Smoking status: Former Smoker     Packs/day: 0.10     Years: 18.00     Pack years: 1.80     Types: Cigarettes     Last attempt to quit: 2014     Years since quittin.4     Smokeless tobacco: Never Used     Tobacco comment: since    Substance and Sexual Activity     Alcohol use: Yes     Comment: weekends <2-4 drinks     Drug use: No     Sexual activity: Yes     Partners: Male     Birth control/protection: Surgical, IUD     Comment: tubal ligation   Lifestyle     Physical activity:     Days per week: Not on file     Minutes per session: Not on file     Stress: Not on file   Relationships      Social connections:     Talks on phone: Not on file     Gets together: Not on file     Attends Evangelical service: Not on file     Active member of club or organization: Not on file     Attends meetings of clubs or organizations: Not on file     Relationship status: Not on file     Intimate partner violence:     Fear of current or ex partner: Not on file     Emotionally abused: Not on file     Physically abused: Not on file     Forced sexual activity: Not on file   Other Topics Concern     Parent/sibling w/ CABG, MI or angioplasty before 65F 55M? No   Social History Narrative    Denies domestic violence issues.     Family History   Adopted: Yes   Problem Relation Age of Onset     Unknown/Adopted Mother      Unknown/Adopted Father      Unknown/Adopted Maternal Grandmother      Unknown/Adopted Maternal Grandfather      Unknown/Adopted Paternal Grandmother      Unknown/Adopted Paternal Grandfather             ROS: 10 point ROS neg other than the symptoms noted above in the HPI.    EXAM:  /88 (BP Location: Right arm, Patient Position: Sitting, Cuff Size: Adult Large)   Pulse 68   Wt 97.2 kg (214 lb 4 oz)   BMI 36.78 kg/m            ASSESSMENT/PLAN:    (N95.1) Menopausal syndrome (hot flashes)  (primary encounter diagnosis)  Comment:   Plan: estradiol (FEMPATCH) 0.025 MG/24HR weekly patch            (Z12.31) Visit for screening mammogram  Comment:   Plan: *MA Screening Digital Bilateral            We discussed menopause, average age, definition, length, symptoms and treatments, triggers and relief measures. .   She is mostly bothered with hot flashes at work and would like to try something to decrease the occurrence.   Wants to try low dose patch.   Reviewed instructions and warning signs.   Will return in 3 months for med evaluation and blood pressure check.   Encouraged to journal to see how she is doing with numbers of events per day.       Visit length 30 minutes was spent face to face with the patient today  discussing her history, diagnosis, and follow-up plan as noted above.  Over 50% of the visit was spent in counseling and coordination of care.    Time noted does not include time required to complete procedures.

## 2019-04-19 NOTE — NURSING NOTE
"Chief Complaint   Patient presents with     Menopausal Sx     hot flashes and excessive sweating       Initial /88 (BP Location: Right arm, Patient Position: Sitting, Cuff Size: Adult Large)   Pulse 68   Wt 97.2 kg (214 lb 4 oz)   BMI 36.78 kg/m   Estimated body mass index is 36.78 kg/m  as calculated from the following:    Height as of 1/18/19: 1.626 m (5' 4\").    Weight as of this encounter: 97.2 kg (214 lb 4 oz).  Medication Reconciliation: complete    Cinthya Love CMA    "

## 2019-07-29 ENCOUNTER — TRANSFERRED RECORDS (OUTPATIENT)
Dept: HEALTH INFORMATION MANAGEMENT | Facility: CLINIC | Age: 49
End: 2019-07-29

## 2019-07-30 ENCOUNTER — TRANSFERRED RECORDS (OUTPATIENT)
Dept: HEALTH INFORMATION MANAGEMENT | Facility: CLINIC | Age: 49
End: 2019-07-30

## 2019-08-15 NOTE — PROGRESS NOTES
[address]  [facility] [address]  [phone]  Dept: [phone]    PRE-OP EVALUATION:  Today's date: 2019    Hannah Krishna (: 1970) presents for pre-operative evaluation assessment as requested by Dr. Redd.  She requires evaluation and anesthesia risk assessment prior to undergoing surgery/procedure for treatment of neck .    Fax number for surgical facility: [phone]  Primary Physician: Merlene Paz  Type of Anesthesia Anticipated: General    Patient has a Health Care Directive or Living Will:  NO    Preop Questions 2019   Who is doing your surgery? Dr. Redd   What are you having done? cervical neck surgery   Date of Surgery/Procedure: 19   Facility or Hospital where procedure/surgery will be performed: [facility]   1.  Do you have a history of Heart attack, stroke, stent, coronary bypass surgery, or other heart surgery? No   2.  Do you ever have any pain or discomfort in your chest? No   3.  Do you have a history of  Heart Failure? No   4.   Are you troubled by shortness of breath when:  walking on a level surface, or up a slight hill, or at night? No   5.  Do you currently have a cold, bronchitis or other respiratory infection? No   6.  Do you have a cough, shortness of breath, or wheezing? No   7.  Do you sometimes get pains in the calves of your legs when you walk? No   8. Do you or anyone in your family have previous history of blood clots? No   9.  Do you or does anyone in your family have a serious bleeding problem such as prolonged bleeding following surgeries or cuts? No   10. Have you ever had problems with anemia or been told to take iron pills? No   11. Have you had any abnormal blood loss such as black, tarry or bloody stools, or abnormal vaginal bleeding? No   12. Have you ever had a blood transfusion? No   13. Have you or any of your relatives ever had problems with anesthesia? No   14. Do you have sleep apnea,  excessive snoring or daytime drowsiness? No   15. Do you have any prosthetic heart valves? No   16. Do you have prosthetic joints? No   17. Is there any chance that you may be pregnant? No       Answers for HPI/ROS submitted by the patient on 8/20/2019   If you checked off any problems, how difficult have these problems made it for you to do your work, take care of things at home, or get along with other people?: Not difficult at all  PHQ9 TOTAL SCORE: 3  CHITRA 7 TOTAL SCORE: 3    HPI:     HPI related to upcoming procedure: Has had 8 months of neck pain, initially saw chiropractor, not improving, went to Neurology, diagnosed with right C6 radiculopathy, with numbness/tingling and weakness, sent to neurosurgeon, surgery was recommended.    DEPRESSION - Patient has a long history of Depression of moderate severity requiring medication for control with recent symptoms being stable.    MEDICAL HISTORY:     Patient Active Problem List    Diagnosis Date Noted     IUD (intrauterine device) in place 04/09/2018     Priority: Medium     Mirena placed 4/9/18 for abnormal uterine bleeding.  Due for removal April 2023.         Morbid obesity (H) 02/16/2018     Priority: Medium     Anxiety 07/31/2013     Priority: Medium     Major depressive disorder, recurrent episode, moderate (H) 12/12/2011     Priority: Medium     Hyperlipidemia, unspecified 10/31/2010     Priority: Medium     Endometriosis of uterus 08/25/2003     Priority: Medium     Polycystic ovaries 04/08/2003     Priority: Medium      Past Medical History:   Diagnosis Date     Chest pain, unspecified 11/2005    atypical - stress echo neg     Chronic depressive personality disorder      Esophageal reflux 12/11/2013     Headache 7/31/2013     Hypertension      Lateral epicondylitis 7/28/2015     Malaise and fatigue 8/1/2005     Obesity, unspecified      Other malaise and fatigue 8/2005     Polycystic ovaries     per pt     Sleep disturbance, unspecified 10/2005    see sleep  study     Unspecified disorder of thyroid 9/2005    thyroid nodule. I-123 WNL.     Past Surgical History:   Procedure Laterality Date     ARTHROSCOPY SHOULDER BICEPS TENODESIS REPAIR Right 4/8/2016    Procedure: ARTHROSCOPY SHOULDER BICEPS TENODESIS REPAIR;  Surgeon: Gilbert Cortes DO;  [facility]     ARTHROSCOPY SHOULDER DECOMPRESSION Right 4/8/2016    Procedure: ARTHROSCOPY SHOULDER DECOMPRESSION;  Surgeon: Gilbert Cortes DO;  [facility]     ARTHROSCOPY SHOULDER DISTAL CLAVICLE REPAIR Right 4/8/2016    Procedure: ARTHROSCOPY SHOULDER DISTAL CLAVICLE RESECTION;  Surgeon: Gilbert Cortes DO;  [facility]     ARTHROSCOPY SHOULDER, OPEN ROTATOR CUFF REPAIR, COMBINED Right 4/8/2016    Procedure: COMBINED ARTHROSCOPY SHOULDER, OPEN ROTATOR CUFF REPAIR;  Surgeon: Gilbert Cortes DO;  [facility]     BUNIONECTOMY  10/24/2013    Procedure: BUNIONECTOMY;;  Surgeon: Ariel Mcdermott DPM;  [facility]     C LIGATE FALLOPIAN TUBE,POSTPARTUM  1998    Tubal Ligation     C NONSPECIFIC PROCEDURE  2002    kidney stones removed     C NONSPECIFIC PROCEDURE  [id number]    lasik surgery     C STRESS ECHO TEST NL  11/2005    neg (low probability of ischemic disease)     EXCISE EXOSTOSIS FOOT  10/24/2013    Procedure: EXCISE EXOSTOSIS FOOT;  left excision of accessory navicular, bunion repair with osteotomy, and navicular remodeling;  Surgeon: Ariel Mcdermott DPM;  [facility]     EYE SURGERY       HC RAD RX IODINE 123 SODIUM IODIDE  UCI, UP  UCI  10/2005    I 123 thryoid -WNL     HEAD & NECK SURGERY      Why is this one highlighted on yes already??     OSTEOTOMY FOOT  10/24/2013    Procedure: OSTEOTOMY FOOT;;  Surgeon: Ariel Mcdermott DPM;  [facility]     Current Outpatient Medications   Medication Sig Dispense Refill     buPROPion (WELLBUTRIN XL) 300 MG 24 hr tablet TAKE ONE TABLET BY MOUTH EVERY MORNING 90 tablet 1     Cyanocobalamin  (VITAMIN B12 PO)        escitalopram (LEXAPRO) 10 MG tablet TAKE ONE TABLET BY MOUTH EVERY DAY 90 tablet 1     levonorgestrel (MIRENA) 20 MCG/24HR IUD 1 each by Intrauterine route once       Pyridoxine HCl (VITAMIN B-6 PO)        vitamin D3 (CHOLECALCIFEROL) 2000 units tablet Take 1 tablet by mouth daily       OTC products: None, except as noted above    Allergies   Allergen Reactions     No Known Drug Allergies       Latex Allergy: NO    Social History     Tobacco Use     Smoking status: Former Smoker     Packs/day: 0.10     Years: 18.00     Pack years: 1.80     Types: Cigarettes     Last attempt to quit: 2014     Years since quittin.7     Smokeless tobacco: Never Used     Tobacco comment: since    Substance Use Topics     Alcohol use: Yes     Comment: weekends <2-4 drinks     History   Drug Use No       REVIEW OF SYSTEMS:   CONSTITUTIONAL: NEGATIVE for fever, chills, change in weight  INTEGUMENTARY/SKIN: NEGATIVE for worrisome rashes, moles or lesions  EYES: NEGATIVE for vision changes or irritation  ENT/MOUTH: NEGATIVE for ear, mouth and throat problems  RESP: NEGATIVE for significant cough or SOB  CV: NEGATIVE for chest pain, palpitations or peripheral edema  GI: NEGATIVE for nausea, abdominal pain, heartburn, or change in bowel habits  : NEGATIVE for frequency, dysuria, or hematuria  MUSCULOSKELETAL: NEGATIVE for significant arthralgias or myalgia  NEURO:  numbness/tingling and weakness of right arm  ENDOCRINE: NEGATIVE for temperature intolerance, skin/hair changes  HEME: NEGATIVE for bleeding problems  PSYCHIATRIC: NEGATIVE for changes in mood or affect    EXAM:   /82 (BP Location: Right arm, Patient Position: Chair, Cuff Size: Adult Large)   Pulse 62   Temp 97.7  F (36.5  C) (Temporal)   Resp 14   Wt 98.4 kg (217 lb)   SpO2 99%   BMI 37.25 kg/m      GENERAL APPEARANCE: healthy, alert and no distress     EYES: EOMI, PERRL     HENT: ear canals and TM's normal and nose and mouth  without ulcers or lesions     NECK: no adenopathy, no asymmetry, masses, or scars and thyroid normal to palpation     RESP: lungs clear to auscultation - no rales, rhonchi or wheezes     CV: regular rates and rhythm, normal S1 S2, no S3 or S4 and no murmur, click or rub     ABDOMEN:  soft, nontender, no HSM or masses and bowel sounds normal     MS: extremities normal- no gross deformities noted, no evidence of inflammation in joints, FROM in all extremities.     SKIN: no suspicious lesions or rashes     NEURO: Normal strength and tone, sensory exam grossly normal, mentation intact and speech normal     PSYCH: mentation appears normal. and affect normal/bright     LYMPHATICS: No cervical adenopathy    DIAGNOSTICS:   EKG: sinus bradycardia, normal axis, normal intervals, no acute ST/T changes c/w ischemia, no LVH by voltage criteria    Recent Labs   Lab Test 02/16/18  1221 07/08/16  0953 08/28/15  0750   HGB 11.9 13.5  --     352  --    NA  --  138 138   POTASSIUM  --  4.4 4.1   CR  --  0.86 0.98      IMPRESSION:   Reason for surgery/procedure: C6 radiculopathy    The proposed surgical procedure is considered INTERMEDIATE risk.    REVISED CARDIAC RISK INDEX  The patient has the following serious cardiovascular risks for perioperative complications such as (MI, PE, VFib and 3  AV Block):  No serious cardiac risks  INTERPRETATION: 0 risks: Class I (very low risk - 0.4% complication rate)    The patient has the following additional risks for perioperative complications:  No identified additional risks      ICD-10-CM    1. Preop general physical exam Z01.818    2. C6 radiculopathy M54.12    3. Morbid obesity (H) E66.01    4. Major depressive disorder, recurrent episode, moderate (H) F33.1    5. Anxiety F41.9        RECOMMENDATIONS:     APPROVAL GIVEN to proceed with proposed procedure, without further diagnostic evaluation     Feeling more irritable.  PHQ-9 and CHITRA unremarkable, however, she just feels that her  mood is off.  Will increase Lexapro to 20 mg and follow up in 3 months for complete physical examination.     Signed Electronically by: Merlene Paz MD    Copy of this evaluation report is provided to requesting physician.    [place] Preop Guidelines    Revised Cardiac Risk Index

## 2019-08-20 ENCOUNTER — OFFICE VISIT (OUTPATIENT)
Dept: FAMILY MEDICINE | Facility: OTHER | Age: 49
End: 2019-08-20
Payer: COMMERCIAL

## 2019-08-20 VITALS
HEART RATE: 62 BPM | DIASTOLIC BLOOD PRESSURE: 82 MMHG | OXYGEN SATURATION: 99 % | TEMPERATURE: 97.7 F | RESPIRATION RATE: 14 BRPM | WEIGHT: 217 LBS | SYSTOLIC BLOOD PRESSURE: 124 MMHG | BODY MASS INDEX: 37.25 KG/M2

## 2019-08-20 DIAGNOSIS — F33.1 MAJOR DEPRESSIVE DISORDER, RECURRENT EPISODE, MODERATE (H): ICD-10-CM

## 2019-08-20 DIAGNOSIS — E66.01 MORBID OBESITY (H): ICD-10-CM

## 2019-08-20 DIAGNOSIS — Z01.818 PREOP GENERAL PHYSICAL EXAM: Primary | ICD-10-CM

## 2019-08-20 DIAGNOSIS — M54.12 C6 RADICULOPATHY: ICD-10-CM

## 2019-08-20 DIAGNOSIS — F41.9 ANXIETY: ICD-10-CM

## 2019-08-20 LAB
ERYTHROCYTE [DISTWIDTH] IN BLOOD BY AUTOMATED COUNT: 13.4 % (ref 10–15)
HCT VFR BLD AUTO: 36.5 % (ref 35–47)
HGB BLD-MCNC: 12 G/DL (ref 11.7–15.7)
MCH RBC QN AUTO: 29.3 PG (ref 26.5–33)
MCHC RBC AUTO-ENTMCNC: 32.9 G/DL (ref 31.5–36.5)
MCV RBC AUTO: 89 FL (ref 78–100)
PLATELET # BLD AUTO: 316 10E9/L (ref 150–450)
RBC # BLD AUTO: 4.1 10E12/L (ref 3.8–5.2)
WBC # BLD AUTO: 5.5 10E9/L (ref 4–11)

## 2019-08-20 PROCEDURE — 99214 OFFICE O/P EST MOD 30 MIN: CPT | Performed by: FAMILY MEDICINE

## 2019-08-20 PROCEDURE — 93000 ELECTROCARDIOGRAM COMPLETE: CPT | Performed by: FAMILY MEDICINE

## 2019-08-20 PROCEDURE — 36415 COLL VENOUS BLD VENIPUNCTURE: CPT | Performed by: FAMILY MEDICINE

## 2019-08-20 PROCEDURE — 85027 COMPLETE CBC AUTOMATED: CPT | Performed by: FAMILY MEDICINE

## 2019-08-20 RX ORDER — BUPROPION HYDROCHLORIDE 300 MG/1
300 TABLET ORAL EVERY MORNING
Qty: 90 TABLET | Refills: 1 | Status: SHIPPED | OUTPATIENT
Start: 2019-08-20 | End: 2020-02-07

## 2019-08-20 RX ORDER — ESCITALOPRAM OXALATE 20 MG/1
20 TABLET ORAL DAILY
Qty: 90 TABLET | Refills: 1 | Status: SHIPPED | OUTPATIENT
Start: 2019-08-20 | End: 2020-01-10

## 2019-08-20 ASSESSMENT — ANXIETY QUESTIONNAIRES
5. BEING SO RESTLESS THAT IT IS HARD TO SIT STILL: NOT AT ALL
GAD7 TOTAL SCORE: 3
2. NOT BEING ABLE TO STOP OR CONTROL WORRYING: NOT AT ALL
6. BECOMING EASILY ANNOYED OR IRRITABLE: SEVERAL DAYS
7. FEELING AFRAID AS IF SOMETHING AWFUL MIGHT HAPPEN: NOT AT ALL
GAD7 TOTAL SCORE: 3
7. FEELING AFRAID AS IF SOMETHING AWFUL MIGHT HAPPEN: NOT AT ALL
4. TROUBLE RELAXING: NOT AT ALL
1. FEELING NERVOUS, ANXIOUS, OR ON EDGE: SEVERAL DAYS
3. WORRYING TOO MUCH ABOUT DIFFERENT THINGS: SEVERAL DAYS
GAD7 TOTAL SCORE: 3

## 2019-08-20 ASSESSMENT — PATIENT HEALTH QUESTIONNAIRE - PHQ9
SUM OF ALL RESPONSES TO PHQ QUESTIONS 1-9: 3
SUM OF ALL RESPONSES TO PHQ QUESTIONS 1-9: 3
10. IF YOU CHECKED OFF ANY PROBLEMS, HOW DIFFICULT HAVE THESE PROBLEMS MADE IT FOR YOU TO DO YOUR WORK, TAKE CARE OF THINGS AT HOME, OR GET ALONG WITH OTHER PEOPLE: NOT DIFFICULT AT ALL

## 2019-08-21 ASSESSMENT — PATIENT HEALTH QUESTIONNAIRE - PHQ9: SUM OF ALL RESPONSES TO PHQ QUESTIONS 1-9: 3

## 2019-08-21 ASSESSMENT — ANXIETY QUESTIONNAIRES: GAD7 TOTAL SCORE: 3

## 2019-09-19 ENCOUNTER — TRANSFERRED RECORDS (OUTPATIENT)
Dept: HEALTH INFORMATION MANAGEMENT | Facility: CLINIC | Age: 49
End: 2019-09-19

## 2019-09-25 ENCOUNTER — MYC MEDICAL ADVICE (OUTPATIENT)
Dept: FAMILY MEDICINE | Facility: OTHER | Age: 49
End: 2019-09-25

## 2019-09-25 ENCOUNTER — NURSE TRIAGE (OUTPATIENT)
Dept: FAMILY MEDICINE | Facility: OTHER | Age: 49
End: 2019-09-25

## 2019-09-25 NOTE — TELEPHONE ENCOUNTER
"Patient calls to report she had cervical fusion surgery last week.  - taking robaxin, Percocet, and stool softener since after surgery  - since yesterday, back, sides have been itchy  - vaginal area is swollen and slightly itchy, 'tender' today  - LMP: 9/23/19 - currently menstruating, discharge is only bloody  - denies urinary symptoms  - denies fever    *She has spoken to her pharmacist, verified that none of the new medications she is on s/p surgery increase risk for UTI or vaginal infections  *she has been recovering from surgery, however, less active than usual      Reason for Disposition    All other vulvar symptoms  (Exception: feels like prior yeast infection, minor abrasion, mild rash < 24 hour duration, mild itching)    Additional Information    Negative: Sounds like a life-threatening emergency to the triager    Itching or rash of external female genital area (vulva)    Negative: Followed a genital area injury    Negative: Symptoms could be from sexual assault    Negative: Pain or burning with urination is main symptom    Negative: Vaginal discharge is main symptom    Negative: Pubic lice suspected    Negative: Pregnant    Negative: Patient sounds very sick or weak to the triager    Negative: [1] SEVERE pain AND [2] not improved 2 hours after pain medicine    Negative: [1] Genital area looks infected (e.g., draining sore, spreading redness) AND [2] fever    Negative: MODERATE-SEVERE itching (i.e., interferes with school, work, or sleep)    Negative: Genital area looks infected (e.g., draining sore, spreading redness)    Negative: Rash with painful tiny water blisters    Negative: [1] Rash (e.g., redness, tiny bumps, sore) of genital area AND [2] present > 24 hours    Negative: Tender lump (swelling or \"ball\") at vaginal opening    Negative: [1] Symptoms of a \"yeast infection\" (i.e., itchy, white discharge, not bad smelling) AND [2] not improved > 3 days following CARE ADVICE    Negative: [1] Vulvar " itching AND [2] not improved > 3 days following CARE ADVICE    Negative: Patient is worried about sexually transmitted disease (STD)    Protocols used: VULVAR SYMPTOMS-A-AH, VAGINAL SYMPTOMS-A-AH    Eliz Self RN, BSN

## 2019-09-26 ENCOUNTER — E-VISIT (OUTPATIENT)
Dept: FAMILY MEDICINE | Facility: OTHER | Age: 49
End: 2019-09-26
Payer: COMMERCIAL

## 2019-09-26 DIAGNOSIS — Z53.9 ERRONEOUS ENCOUNTER--DISREGARD: Primary | ICD-10-CM

## 2019-09-26 NOTE — TELEPHONE ENCOUNTER
See Mychart encounter from yesterday.  LM for patient regarding message below.  Migdalia Mccarthy CMA ([facility])

## 2019-09-26 NOTE — TELEPHONE ENCOUNTER
LM for patient to return phone call to clinic.  Patient submitted a Evisit and provider reviewed symptoms.  Patient will need to schedule an OV in clinic to discuss symptoms, testing and possible treatment if needed.  Migdalia Mccarthy CMA ([facility])

## 2019-09-26 NOTE — TELEPHONE ENCOUNTER
Patient sent MyChart message. Based on a chart review, it appears the patient was maybe trying to do an E-Visit.     Responded to patient via [employer] to let her know she sent another [employer] message instead of doing an E-Visit. Also offered her the option to schedule with a provider in clinic.       Rosalina Corona RN BSN

## 2019-09-26 NOTE — TELEPHONE ENCOUNTER
Patient scheduled an OV tomorrow with RK.  Migdalia Mccarthy CMA ([facility])

## 2019-09-27 ENCOUNTER — OFFICE VISIT (OUTPATIENT)
Dept: FAMILY MEDICINE | Facility: OTHER | Age: 49
End: 2019-09-27
Payer: COMMERCIAL

## 2019-09-27 VITALS
RESPIRATION RATE: 16 BRPM | HEIGHT: 64 IN | SYSTOLIC BLOOD PRESSURE: 120 MMHG | TEMPERATURE: 97.9 F | DIASTOLIC BLOOD PRESSURE: 80 MMHG | OXYGEN SATURATION: 98 % | BODY MASS INDEX: 36.37 KG/M2 | WEIGHT: 213 LBS | HEART RATE: 71 BPM

## 2019-09-27 DIAGNOSIS — L30.4 INTERTRIGO: Primary | ICD-10-CM

## 2019-09-27 PROCEDURE — 99213 OFFICE O/P EST LOW 20 MIN: CPT | Performed by: FAMILY MEDICINE

## 2019-09-27 RX ORDER — METHOCARBAMOL 750 MG/1
750 TABLET, FILM COATED ORAL PRN
COMMUNITY
Start: 2019-09-20 | End: 2019-12-09

## 2019-09-27 RX ORDER — NYSTATIN 100000 [USP'U]/G
POWDER TOPICAL 2 TIMES DAILY PRN
Qty: 60 G | Refills: 0 | Status: SHIPPED | OUTPATIENT
Start: 2019-09-27 | End: 2019-12-09

## 2019-09-27 RX ORDER — OXYCODONE AND ACETAMINOPHEN 5; 325 MG/1; MG/1
1-2 TABLET ORAL PRN
COMMUNITY
Start: 2019-09-20 | End: 2019-12-09

## 2019-09-27 RX ORDER — AMOXICILLIN 250 MG
2-4 CAPSULE ORAL 2 TIMES DAILY
COMMUNITY
Start: 2019-09-20 | End: 2019-12-09

## 2019-09-27 ASSESSMENT — PAIN SCALES - GENERAL: PAINLEVEL: NO PAIN (0)

## 2019-09-27 ASSESSMENT — MIFFLIN-ST. JEOR: SCORE: 1581.16

## 2019-09-27 NOTE — ASSESSMENT & PLAN NOTE
Patient is noted to have significant intertrigo along labia majora and labial folds.trial nystatin.

## 2019-09-27 NOTE — PROGRESS NOTES
Pt at rehab facility for generalized weakness and inability to walk. 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 Pt experienced headache  and lightheadness this am and was hypotensive at rehab facility. Pt also c/o right arm weakness for 10 days pta. Pt alert and oriented and in no acute distress    Subjective     Hannah Krishna is a 49 year old female who presents to clinic today for the following health issues:    HPI     Vaginal Symptoms  Onset: x 5 days    Description:  Vaginal Discharge: none   Itching (Pruritis): YES  Burning sensation:  no   Odor: no     Accompanying Signs & Symptoms:  Pain with Urination: no   Abdominal Pain: no   Fever: no     History:   Sexually active: no   New Partner: no   Possibility of Pregnancy:  No    Precipitating factors:   Recent Antibiotic Use: YES    Alleviating factors:  NA  Therapies Tried and outcome: None        -------------------------------------    Patient Active Problem List   Diagnosis     Polycystic ovaries     Endometriosis of uterus     Hyperlipidemia, unspecified     Major depressive disorder, recurrent episode, moderate (H)     Anxiety     Morbid obesity (H)     IUD (intrauterine device) in place     Intertrigo     Past Surgical History:   Procedure Laterality Date     ARTHROSCOPY SHOULDER BICEPS TENODESIS REPAIR Right 4/8/2016    Procedure: ARTHROSCOPY SHOULDER BICEPS TENODESIS REPAIR;  Surgeon: Gilbert Cortes DO;  [facility]     ARTHROSCOPY SHOULDER DECOMPRESSION Right 4/8/2016    Procedure: ARTHROSCOPY SHOULDER DECOMPRESSION;  Surgeon: Gilbert Cortes DO;  [facility]     ARTHROSCOPY SHOULDER DISTAL CLAVICLE REPAIR Right 4/8/2016    Procedure: ARTHROSCOPY SHOULDER DISTAL CLAVICLE RESECTION;  Surgeon: Gilbert Cortes DO;  [facility]     ARTHROSCOPY SHOULDER, OPEN ROTATOR CUFF REPAIR, COMBINED Right 4/8/2016    Procedure: COMBINED ARTHROSCOPY SHOULDER, OPEN ROTATOR CUFF REPAIR;  Surgeon: Gilbert Cortes DO;  [facility]     BUNIONECTOMY  10/24/2013    Procedure: BUNIONECTOMY;;  Surgeon: Ariel Mcdermott DPM;  [facility]     C LIGATE FALLOPIAN TUBE,POSTPARTUM  1998    Tubal Ligation     C NONSPECIFIC PROCEDURE  2002    kidney stones removed     C NONSPECIFIC PROCEDURE  582563    lasik  surgery     C STRESS ECHO TEST NL  2005    neg (low probability of ischemic disease)     EXCISE EXOSTOSIS FOOT  10/24/2013    Procedure: EXCISE EXOSTOSIS FOOT;  left excision of accessory navicular, bunion repair with osteotomy, and navicular remodeling;  Surgeon: Ariel Mcdermott DPM;  [facility]     EYE SURGERY       HC RAD RX IODINE 123 SODIUM IODIDE  UCI, UP  UCI  10/2005    I 123 thryoid -WNL     HEAD & NECK SURGERY      Why is this one highlighted on yes already??     OSTEOTOMY FOOT  10/24/2013    Procedure: OSTEOTOMY FOOT;;  Surgeon: Ariel Mcdermott DPM;  [facility]       Social History     Tobacco Use     Smoking status: Former Smoker     Packs/day: 0.10     Years: 18.00     Pack years: 1.80     Types: Cigarettes     Last attempt to quit: 2014     Years since quittin.8     Smokeless tobacco: Never Used     Tobacco comment: since    Substance Use Topics     Alcohol use: Yes     Comment: weekends <2-4 drinks     Family History   Adopted: Yes   Problem Relation Age of Onset     Unknown/Adopted Mother      Unknown/Adopted Father      Unknown/Adopted Maternal Grandmother      Unknown/Adopted Maternal Grandfather      Unknown/Adopted Paternal Grandmother      Unknown/Adopted Paternal Grandfather          Current Outpatient Medications   Medication Sig Dispense Refill     buPROPion (WELLBUTRIN XL) 300 MG 24 hr tablet Take 1 tablet (300 mg) by mouth every morning 90 tablet 1     escitalopram (LEXAPRO) 20 MG tablet Take 1 tablet (20 mg) by mouth daily 90 tablet 1     levonorgestrel (MIRENA) 20 MCG/24HR IUD 1 each by Intrauterine route once       methocarbamol (ROBAXIN) 750 MG tablet Take 750 mg by mouth as needed       nystatin (MYCOSTATIN) 266637 UNIT/GM external powder Apply topically 2 times daily as needed for other (fungal infection) 60 g 0     oxyCODONE-acetaminophen (PERCOCET) 5-325 MG tablet Take 1-2 tablets by mouth as needed       senna-docusate  "(SENOKOT-S/PERICOLACE) 8.6-50 MG tablet Take 2-4 tablets by mouth 2 times daily       Cyanocobalamin (VITAMIN B12 PO)        Pyridoxine HCl (VITAMIN B-6 PO)        vitamin D3 (CHOLECALCIFEROL) 2000 units tablet Take 1 tablet by mouth daily       Allergies   Allergen Reactions     No Known Drug Allergies      BP Readings from Last 3 Encounters:   09/27/19 120/80   08/20/19 124/82   04/19/19 142/88    Wt Readings from Last 3 Encounters:   09/27/19 96.6 kg (213 lb)   08/20/19 98.4 kg (217 lb)   04/19/19 97.2 kg (214 lb 4 oz)                    Reviewed and updated as needed this visit by Provider         Review of Systems   ROS COMP: Constitutional, HEENT, cardiovascular, pulmonary, gi and gu systems are negative, except as otherwise noted.      Objective    /80 (BP Location: Right arm, Patient Position: Chair, Cuff Size: Adult Regular)   Pulse 71   Temp 97.9  F (36.6  C) (Temporal)   Resp 16   Ht 1.626 m (5' 4\")   Wt 96.6 kg (213 lb)   SpO2 98%   BMI 36.56 kg/m    Body mass index is 36.56 kg/m .  Physical Exam  Constitutional:       Appearance: Normal appearance.   HENT:      Head: Normocephalic and atraumatic.   Eyes:      Extraocular Movements: Extraocular movements intact.   Neck:      Musculoskeletal: Normal range of motion and neck supple.   Cardiovascular:      Rate and Rhythm: Normal rate and regular rhythm.   Skin:     Comments: Erythematous rash along the labial fold consistent with intertrigo   Neurological:      Mental Status: She is alert.            Diagnostic Test Results:  Labs reviewed in Epic        Assessment & Plan   Problem List Items Addressed This Visit     Intertrigo - Primary     Patient is noted to have significant intertrigo along labia majora and labial folds.trial nystatin.         Relevant Medications    nystatin (MYCOSTATIN) 419683 UNIT/GM external powder             BMI:   Estimated body mass index is 36.56 kg/m  as calculated from the following:    Height as of this " "encounter: 1.626 m (5' 4\").    Weight as of this encounter: 96.6 kg (213 lb).           See Patient Instructions  Return in about 3 months (around 12/27/2019).    Brooke Cotter MD  [facility]    "

## 2019-09-28 ENCOUNTER — HEALTH MAINTENANCE LETTER (OUTPATIENT)
Age: 49
End: 2019-09-28

## 2019-10-21 ENCOUNTER — TRANSFERRED RECORDS (OUTPATIENT)
Dept: HEALTH INFORMATION MANAGEMENT | Facility: CLINIC | Age: 49
End: 2019-10-21

## 2019-12-09 ENCOUNTER — OFFICE VISIT (OUTPATIENT)
Dept: FAMILY MEDICINE | Facility: OTHER | Age: 49
End: 2019-12-09
Payer: COMMERCIAL

## 2019-12-09 VITALS
BODY MASS INDEX: 37.68 KG/M2 | HEIGHT: 64 IN | WEIGHT: 220.7 LBS | HEART RATE: 71 BPM | TEMPERATURE: 98.2 F | OXYGEN SATURATION: 99 % | DIASTOLIC BLOOD PRESSURE: 72 MMHG | RESPIRATION RATE: 16 BRPM | SYSTOLIC BLOOD PRESSURE: 120 MMHG

## 2019-12-09 DIAGNOSIS — R05.9 COUGH: ICD-10-CM

## 2019-12-09 DIAGNOSIS — J01.00 ACUTE NON-RECURRENT MAXILLARY SINUSITIS: Primary | ICD-10-CM

## 2019-12-09 PROCEDURE — 99213 OFFICE O/P EST LOW 20 MIN: CPT | Performed by: STUDENT IN AN ORGANIZED HEALTH CARE EDUCATION/TRAINING PROGRAM

## 2019-12-09 RX ORDER — DEXTROMETHORPHAN HBR. AND GUAIFENESIN 10; 100 MG/5ML; MG/5ML
5 SOLUTION ORAL 4 TIMES DAILY PRN
Qty: 118 ML | Refills: 0 | Status: SHIPPED | OUTPATIENT
Start: 2019-12-09 | End: 2019-12-12

## 2019-12-09 ASSESSMENT — MIFFLIN-ST. JEOR: SCORE: 1616.34

## 2019-12-09 NOTE — PROGRESS NOTES
Subjective     Hannah Krishna is a 49 year old female who presents to clinic today for the following health issues:  Patient no longer taking methocarbamol, nystatin, senna, or percocet. Please remove if appropriate.  HPI   Acute Illness   Acute illness concerns: Cold Symptoms  Onset: 1 week    Fever: YES- low grade    Chills/Sweats: YES    Headache (location?): YES    Sinus Pressure:YES    Conjunctivitis:  no    Ear Pain: YES: both, mostly right    Rhinorrhea: YES    Congestion: YES    Sore Throat: no     Cough: YES-productive of green sputum    Wheeze: Hard to tell    Decreased Appetite: YES    Nausea: no    Vomiting: no    Diarrhea:  no    Dysuria/Freq.: YES- frequent    Fatigue/Achiness: YES    Sick/Strep Exposure: no     Therapies Tried and outcome: Mucinex, tylenol - does not help symptoms      Patient Active Problem List   Diagnosis     Polycystic ovaries     Endometriosis of uterus     Hyperlipidemia, unspecified     Major depressive disorder, recurrent episode, moderate (H)     Anxiety     Morbid obesity (H)     IUD (intrauterine device) in place     Intertrigo     Past Surgical History:   Procedure Laterality Date     ARTHROSCOPY SHOULDER BICEPS TENODESIS REPAIR Right 4/8/2016    Procedure: ARTHROSCOPY SHOULDER BICEPS TENODESIS REPAIR;  Surgeon: Gilbert Cortes DO;  [facility]     ARTHROSCOPY SHOULDER DECOMPRESSION Right 4/8/2016    Procedure: ARTHROSCOPY SHOULDER DECOMPRESSION;  Surgeon: Gilbert Cortes DO;  [facility]     ARTHROSCOPY SHOULDER DISTAL CLAVICLE REPAIR Right 4/8/2016    Procedure: ARTHROSCOPY SHOULDER DISTAL CLAVICLE RESECTION;  Surgeon: Gilbert Cortes DO;  [facility]     ARTHROSCOPY SHOULDER, OPEN ROTATOR CUFF REPAIR, COMBINED Right 4/8/2016    Procedure: COMBINED ARTHROSCOPY SHOULDER, OPEN ROTATOR CUFF REPAIR;  Surgeon: Gilbert Cortes DO;  [facility]     BUNIONECTOMY  10/24/2013    Procedure: BUNIONECTOMY;;  Surgeon: Baldemar  Ariel VACA DPM;  [facility]     C LIGATE FALLOPIAN TUBE,POSTPARTUM      Tubal Ligation     C NONSPECIFIC PROCEDURE      kidney stones removed     C NONSPECIFIC PROCEDURE  [id number]    lasik surgery     C STRESS ECHO TEST NL  2005    neg (low probability of ischemic disease)     EXCISE EXOSTOSIS FOOT  10/24/2013    Procedure: EXCISE EXOSTOSIS FOOT;  left excision of accessory navicular, bunion repair with osteotomy, and navicular remodeling;  Surgeon: Ariel Mcdermott DPM;  [facility]     EYE SURGERY       HC RAD RX IODINE 123 SODIUM IODIDE  UCI, UP  UCI  10/2005    I 123 thryoid -WNL     HEAD & NECK SURGERY      Why is this one highlighted on yes already??     OSTEOTOMY FOOT  10/24/2013    Procedure: OSTEOTOMY FOOT;;  Surgeon: Ariel Mcdermott DPM;  [facility]       Social History     Tobacco Use     Smoking status: Former Smoker     Packs/day: 0.10     Years: 18.00     Pack years: 1.80     Types: Cigarettes     Last attempt to quit: 2014     Years since quittin.0     Smokeless tobacco: Never Used     Tobacco comment: since    Substance Use Topics     Alcohol use: Yes     Comment: weekends <2-4 drinks     Family History   Adopted: Yes   Problem Relation Age of Onset     Unknown/Adopted Mother      Unknown/Adopted Father      Unknown/Adopted Maternal Grandmother      Unknown/Adopted Maternal Grandfather      Unknown/Adopted Paternal Grandmother      Unknown/Adopted Paternal Grandfather          Current Outpatient Medications   Medication Sig Dispense Refill     amoxicillin-clavulanate (AUGMENTIN) 875-125 MG tablet Take 1 tablet by mouth 2 times daily for 10 days 20 tablet 0     buPROPion (WELLBUTRIN XL) 300 MG 24 hr tablet Take 1 tablet (300 mg) by mouth every morning 90 tablet 1     Cyanocobalamin (VITAMIN B12 PO)        dextromethorphan-guaiFENesin (TUSSIN DM)  MG/5ML liquid Take 5 mLs by mouth 4 times daily as needed (cough) 118 mL 0     escitalopram  "(LEXAPRO) 20 MG tablet Take 1 tablet (20 mg) by mouth daily 90 tablet 1     levonorgestrel (MIRENA) 20 MCG/24HR IUD 1 each by Intrauterine route once       Pyridoxine HCl (VITAMIN B-6 PO)        vitamin D3 (CHOLECALCIFEROL) 2000 units tablet Take 1 tablet by mouth daily       methocarbamol (ROBAXIN) 750 MG tablet Take 750 mg by mouth as needed       nystatin (MYCOSTATIN) 826538 UNIT/GM external powder Apply topically 2 times daily as needed for other (fungal infection) (Patient not taking: Reported on 12/9/2019) 60 g 0     oxyCODONE-acetaminophen (PERCOCET) 5-325 MG tablet Take 1-2 tablets by mouth as needed       senna-docusate (SENOKOT-S/PERICOLACE) 8.6-50 MG tablet Take 2-4 tablets by mouth 2 times daily       Allergies   Allergen Reactions     No Known Drug Allergies      BP Readings from Last 3 Encounters:   12/09/19 120/72   09/27/19 120/80   08/20/19 124/82    Wt Readings from Last 3 Encounters:   12/09/19 100.1 kg (220 lb 11.2 oz)   09/27/19 96.6 kg (213 lb)   08/20/19 98.4 kg (217 lb)                    Reviewed and updated as needed this visit by Provider         Review of Systems   ROS COMP: Constitutional, HEENT, cardiovascular, pulmonary, gi and gu systems are negative, except as otherwise noted.      Objective    /72   Pulse 71   Temp 98.2  F (36.8  C) (Oral)   Resp 16   Ht 1.626 m (5' 4.02\")   Wt 100.1 kg (220 lb 11.2 oz)   SpO2 99%   BMI 37.86 kg/m    Body mass index is 37.86 kg/m .  Physical Exam   GENERAL: alert and no distress  EYES: Eyes grossly normal to inspection, PERRL and conjunctivae and sclerae normal  HENT: ear canals normal, TMs dull with clear effusions, maxillary and frontal sinuses tender to palpation, purulent postnasal drainage, mouth without ulcers or lesions  NECK: no adenopathy, no asymmetry, masses, or scars  RESP: lungs clear to auscultation - no rales, rhonchi or wheezes  CV: regular rate and rhythm, normal S1 S2, no S3 or S4, no murmur, click or rub  MS: no gross " musculoskeletal defects noted  SKIN: no suspicious lesions or rashes  NEURO: Normal strength and tone, mentation intact and speech normal    Diagnostic Test Results:  Labs reviewed in Epic        Assessment & Plan     1. Acute non-recurrent maxillary sinusitis  Treat with antibiotic for sinus infection. Supportive cares reviewed. Follow up if symptoms persist or worsen.   - amoxicillin-clavulanate (AUGMENTIN) 875-125 MG tablet; Take 1 tablet by mouth 2 times daily for 10 days  Dispense: 20 tablet; Refill: 0    2. Cough  Presumed to be due to postnasal drainage as it worsens at night. Lungs are clear. Recommended she follow up if her cough worsens or persists or if she starts to feel feverish or increasing fatigue.   - dextromethorphan-guaiFENesin (TUSSIN DM)  MG/5ML liquid; Take 5 mLs by mouth 4 times daily as needed (cough)  Dispense: 118 mL; Refill: 0     No follow-ups on file.    VEENA Erickson [facility]

## 2019-12-09 NOTE — PATIENT INSTRUCTIONS
Start antibiotic called Augmentin twice daily for 10 days for sinus infection.    Okay to take Robitussin with codeine up to 4 times daily as needed for cough.    Jenniffer Johnson NP-C

## 2019-12-11 ENCOUNTER — TELEPHONE (OUTPATIENT)
Dept: FAMILY MEDICINE | Facility: OTHER | Age: 49
End: 2019-12-11

## 2019-12-11 DIAGNOSIS — R05.9 COUGH: ICD-10-CM

## 2019-12-11 NOTE — TELEPHONE ENCOUNTER
Reason for Call:  Medication or medication refill:    Do you use a [facility]?  Name of the pharmacy and phone number for the current request:  [facility] - [phone]    Name of the medication requested: Codeine (or something similar)    Other request: Patient was seen on 12/9 and was prescribed amoxicillin but is needing something for the pain and coughing. She states her ribs and head hurt terribly when she coughs and tylenol isnt helping. Patient is requesting something like codeine to help with symptoms.     Can we leave a detailed message on this number? YES    Phone number patient can be reached at: Home number on file [phone] (home)    Best Time: any    Call taken on 12/11/2019 at 9:56 AM by Ed De

## 2019-12-12 RX ORDER — DEXTROMETHORPHAN HBR. AND GUAIFENESIN 10; 100 MG/5ML; MG/5ML
5 SOLUTION ORAL 4 TIMES DAILY PRN
Qty: 118 ML | Refills: 0 | Status: SHIPPED | OUTPATIENT
Start: 2019-12-12 | End: 2020-01-10

## 2019-12-12 NOTE — TELEPHONE ENCOUNTER
Spoke with Patient and she is going to try switching over to taking ibuprofen rather than the Tylenol. She is still having a hard time getting rid of her cough and is almost out of the Tussin DM. She is looking to get something with Codeine in it to help stop her coughing. Also asked her is she would like to get the steroid as suggested and she was not sure if that will help.Please advise.    MADAI Magdaleno

## 2019-12-12 NOTE — TELEPHONE ENCOUNTER
Please call patient and see if she has tried ibuprofen as this would work better than Tylenol. I could send in a prescription for a steroid as this may help with the cough and the sinus infection. I cannot send in pain medication as they are controlled substances and not indicated for pain related to coughing.   Jenniffer Johnson, NP-C

## 2019-12-13 NOTE — TELEPHONE ENCOUNTER
I sent in a refill of the robitussin with codeine. I recommend she follow up if the symptoms persist or worsen.  Jenniffer Johnson, GABBIE-C

## 2019-12-17 ENCOUNTER — TRANSFERRED RECORDS (OUTPATIENT)
Dept: HEALTH INFORMATION MANAGEMENT | Facility: CLINIC | Age: 49
End: 2019-12-17

## 2020-01-10 ENCOUNTER — OFFICE VISIT (OUTPATIENT)
Dept: FAMILY MEDICINE | Facility: OTHER | Age: 50
End: 2020-01-10
Payer: COMMERCIAL

## 2020-01-10 VITALS
HEIGHT: 63 IN | TEMPERATURE: 97 F | WEIGHT: 216 LBS | BODY MASS INDEX: 38.27 KG/M2 | HEART RATE: 70 BPM | DIASTOLIC BLOOD PRESSURE: 74 MMHG | SYSTOLIC BLOOD PRESSURE: 126 MMHG | RESPIRATION RATE: 16 BRPM | OXYGEN SATURATION: 98 %

## 2020-01-10 DIAGNOSIS — F33.1 MAJOR DEPRESSIVE DISORDER, RECURRENT EPISODE, MODERATE (H): ICD-10-CM

## 2020-01-10 DIAGNOSIS — F41.9 ANXIETY: ICD-10-CM

## 2020-01-10 PROBLEM — E66.01 MORBID OBESITY (H): Status: RESOLVED | Noted: 2018-02-16 | Resolved: 2020-01-10

## 2020-01-10 PROBLEM — M43.22 FUSION OF SPINE OF CERVICAL REGION: Status: ACTIVE | Noted: 2020-01-10

## 2020-01-10 PROBLEM — L30.4 INTERTRIGO: Status: RESOLVED | Noted: 2019-09-27 | Resolved: 2020-01-10

## 2020-01-10 PROCEDURE — 99214 OFFICE O/P EST MOD 30 MIN: CPT | Performed by: FAMILY MEDICINE

## 2020-01-10 RX ORDER — ESCITALOPRAM OXALATE 20 MG/1
TABLET ORAL
Qty: 90 TABLET | Refills: 1
Start: 2020-01-10 | End: 2020-02-07

## 2020-01-10 ASSESSMENT — PATIENT HEALTH QUESTIONNAIRE - PHQ9
SUM OF ALL RESPONSES TO PHQ QUESTIONS 1-9: 9
5. POOR APPETITE OR OVEREATING: NOT AT ALL

## 2020-01-10 ASSESSMENT — ANXIETY QUESTIONNAIRES
3. WORRYING TOO MUCH ABOUT DIFFERENT THINGS: SEVERAL DAYS
1. FEELING NERVOUS, ANXIOUS, OR ON EDGE: SEVERAL DAYS
5. BEING SO RESTLESS THAT IT IS HARD TO SIT STILL: NOT AT ALL
2. NOT BEING ABLE TO STOP OR CONTROL WORRYING: SEVERAL DAYS
7. FEELING AFRAID AS IF SOMETHING AWFUL MIGHT HAPPEN: NOT AT ALL
GAD7 TOTAL SCORE: 6
6. BECOMING EASILY ANNOYED OR IRRITABLE: NEARLY EVERY DAY

## 2020-01-10 ASSESSMENT — MIFFLIN-ST. JEOR: SCORE: 1582.86

## 2020-01-11 ASSESSMENT — ANXIETY QUESTIONNAIRES: GAD7 TOTAL SCORE: 6

## 2020-02-05 NOTE — PROGRESS NOTES
Subjective     Hannah Krishna is a 49 year old female who presents to clinic today for the following health issues:    History of Present Illness        Mental Health Follow-up:  Patient presents to follow-up on Depression & Anxiety.Patient's depression since last visit has been:  Better  The patient is not having other symptoms associated with depression.  Patient's anxiety since last visit has been:  Better  The patient is not having other symptoms associated with anxiety.  Any significant life events: No  Patient is not feeling anxious or having panic attacks.  Patient has no concerns about alcohol or drug use.     Social History  Tobacco Use    Smoking status: Former Smoker      Packs/day: 0.10      Years: 18.00      Pack years: 1.8      Types: Cigarettes      Quit date: 2014      Years since quittin.2    Smokeless tobacco: Never Used    Tobacco comment: since   Alcohol use: Yes    Comment: weekends <2-4 drinks  Drug use: No      Today's PHQ-9         PHQ-9 Total Score:     0   PHQ-9 Q9 Thoughts of better off dead/self-harm past 2 weeks :   Not at all   Thoughts of suicide or self harm:      Self-harm Plan:        Self-harm Action:          Safety concerns for self or others:           She eats 2-3 servings of fruits and vegetables daily.She consumes 0 sweetened beverage(s) daily.She exercises with enough effort to increase her heart rate 20 to 29 minutes per day.  She exercises with enough effort to increase her heart rate 3 or less days per week.   She is taking medications regularly.     Patient states that over the last month she has had a couple of illnesses.  She states she had norovirus and has resolved from that.  She now states she has had a cold for the last 4 days but feels she is slowly improving.  She complains of nasal congestion, postnasal drip.  Despite these illnesses she feels her mental health has improved with the change from Lexapro to sertraline.  She asked her  who also  felt that her mood has been improving.    Patient Active Problem List   Diagnosis     Major depressive disorder, recurrent episode, moderate (H)     Anxiety     IUD (intrauterine device) in place     Fusion of spine of cervical region     Past Surgical History:   Procedure Laterality Date     ARTHROSCOPY SHOULDER BICEPS TENODESIS REPAIR Right 4/8/2016    Procedure: ARTHROSCOPY SHOULDER BICEPS TENODESIS REPAIR;  Surgeon: Gilbert Cortes DO;  [facility]     ARTHROSCOPY SHOULDER DECOMPRESSION Right 4/8/2016    Procedure: ARTHROSCOPY SHOULDER DECOMPRESSION;  Surgeon: Gilbert Cortes DO;  [facility]     ARTHROSCOPY SHOULDER DISTAL CLAVICLE REPAIR Right 4/8/2016    Procedure: ARTHROSCOPY SHOULDER DISTAL CLAVICLE RESECTION;  Surgeon: Gilbert Cortes DO;  [facility]     ARTHROSCOPY SHOULDER, OPEN ROTATOR CUFF REPAIR, COMBINED Right 4/8/2016    Procedure: COMBINED ARTHROSCOPY SHOULDER, OPEN ROTATOR CUFF REPAIR;  Surgeon: Gilbert Cortes DO;  [facility]     BUNIONECTOMY  10/24/2013    Procedure: BUNIONECTOMY;;  Surgeon: Ariel Mcdermott DPM;  Location:  OR     C LIGATE FALLOPIAN TUBE,POSTPARTUM  1998    Tubal Ligation     C NONSPECIFIC PROCEDURE  2002    kidney stones removed     C NONSPECIFIC PROCEDURE  470228    lasik surgery     C STRESS ECHO TEST NL  11/2005    neg (low probability of ischemic disease)     EXCISE EXOSTOSIS FOOT  10/24/2013    Procedure: EXCISE EXOSTOSIS FOOT;  left excision of accessory navicular, bunion repair with osteotomy, and navicular remodeling;  Surgeon: Ariel Mcdermott DPM;  Location[facility]     EYE SURGERY       HC RAD RX IODINE 123 SODIUM IODIDE  UCI, UP  UCI  10/2005    I 123 thryoid -WNL     HEAD & NECK SURGERY      Why is this one highlighted on yes already??     OSTEOTOMY FOOT  10/24/2013    Procedure: OSTEOTOMY FOOT;;  Surgeon: Ariel Mcdermott DPM;  [facility]       Social History     Tobacco Use      Smoking status: Former Smoker     Packs/day: 0.10     Years: 18.00     Pack years: 1.80     Types: Cigarettes     Last attempt to quit: 2014     Years since quittin.2     Smokeless tobacco: Never Used     Tobacco comment: since    Substance Use Topics     Alcohol use: Yes     Comment: weekends <2-4 drinks     Family History   Adopted: Yes   Problem Relation Age of Onset     Unknown/Adopted Mother      Unknown/Adopted Father      Unknown/Adopted Maternal Grandmother      Unknown/Adopted Maternal Grandfather      Unknown/Adopted Paternal Grandmother      Unknown/Adopted Paternal Grandfather            Reviewed and updated as needed this visit by Provider  Tobacco  Allergies  Meds  Problems  Med Hx  Surg Hx  Fam Hx         Review of Systems   CONSTITUTIONAL: NEGATIVE for fever, chills, change in weight      Objective    /70   Pulse 66   Temp 98.2  F (36.8  C) (Temporal)   Resp 16   Wt 95.3 kg (210 lb)   LMP 2020 (Approximate)   SpO2 97%   BMI 36.91 kg/m    Body mass index is 36.91 kg/m .  Physical Exam   Gen: no apparent distress  HENT: Ears normal. Throat and pharynx normal. Neck supple. No adenopathy or masses in the neck or supraclavicular regions. Sinuses non tender. Nasal turbinates pale, with clear rhinorrhea.  Chest/CV: S1 and S2 normal, no murmurs, clicks, gallops or rubs. Regular rate and rhythm. Chest is clear; no wheezes or rales. No edema.  Psych: Alert and oriented times 3; coherent speech, normal   rate and volume, able to articulate logical thoughts, able   to abstract reason, no tangential thoughts, no hallucinations   or delusions  Her affect is neutral          Assessment & Plan     1. Major depressive disorder, recurrent episode, moderate (H)  Improved.  Continue sertraline at 50 mg.  She never increased to 100 mg.  Continue her Wellbutrin at 300 mg daily.  We will follow-up in 6 months.  She will follow-up earlier if her symptoms worsen.    - buPROPion  (WELLBUTRIN XL) 300 MG 24 hr tablet; Take 1 tablet (300 mg) by mouth every morning  Dispense: 90 tablet; Refill: 1  - sertraline (ZOLOFT) 50 MG tablet; Take 1 tablet (50 mg) by mouth daily  Dispense: 90 tablet; Refill: 1    2. Anxiety  As above.    - sertraline (ZOLOFT) 50 MG tablet; Take 1 tablet (50 mg) by mouth daily  Dispense: 90 tablet; Refill: 1    3. Lipid screening  It is been 2 years since her last cholesterol check.  She would like to have this redone.    - Lipid panel reflex to direct LDL Fasting    4. Screening for diabetes mellitus  Patient is fasting today.  Will screen for diabetes.    - Glucose    5. Upper respiratory tract infection, unspecified type  Continue symptomatic cares.    Return in about 6 months (around 8/7/2020) for depression follow up.    Merlene Paz MD  [facility]    Answers for HPI/ROS submitted by the patient on 2/7/2020   Chronic problems general questions HPI Form  If you checked off any problems, how difficult have these problems made it for you to do your work, take care of things at home, or get along with other people?: Not difficult at all  PHQ9 TOTAL SCORE: 0  CHITRA 7 TOTAL SCORE: 0

## 2020-02-07 ENCOUNTER — OFFICE VISIT (OUTPATIENT)
Dept: FAMILY MEDICINE | Facility: OTHER | Age: 50
End: 2020-02-07
Payer: COMMERCIAL

## 2020-02-07 VITALS
TEMPERATURE: 98.2 F | BODY MASS INDEX: 36.91 KG/M2 | HEART RATE: 66 BPM | SYSTOLIC BLOOD PRESSURE: 126 MMHG | RESPIRATION RATE: 16 BRPM | WEIGHT: 210 LBS | DIASTOLIC BLOOD PRESSURE: 70 MMHG | OXYGEN SATURATION: 97 %

## 2020-02-07 DIAGNOSIS — Z13.220 LIPID SCREENING: ICD-10-CM

## 2020-02-07 DIAGNOSIS — J06.9 UPPER RESPIRATORY TRACT INFECTION, UNSPECIFIED TYPE: ICD-10-CM

## 2020-02-07 DIAGNOSIS — F41.9 ANXIETY: ICD-10-CM

## 2020-02-07 DIAGNOSIS — Z13.1 SCREENING FOR DIABETES MELLITUS: ICD-10-CM

## 2020-02-07 DIAGNOSIS — F33.1 MAJOR DEPRESSIVE DISORDER, RECURRENT EPISODE, MODERATE (H): Primary | ICD-10-CM

## 2020-02-07 LAB
CHOLEST SERPL-MCNC: 192 MG/DL
GLUCOSE SERPL-MCNC: 83 MG/DL (ref 70–99)
HDLC SERPL-MCNC: 51 MG/DL
LDLC SERPL CALC-MCNC: 120 MG/DL
NONHDLC SERPL-MCNC: 141 MG/DL
TRIGL SERPL-MCNC: 105 MG/DL

## 2020-02-07 PROCEDURE — 82947 ASSAY GLUCOSE BLOOD QUANT: CPT | Performed by: FAMILY MEDICINE

## 2020-02-07 PROCEDURE — 80061 LIPID PANEL: CPT | Performed by: FAMILY MEDICINE

## 2020-02-07 PROCEDURE — 36415 COLL VENOUS BLD VENIPUNCTURE: CPT | Performed by: FAMILY MEDICINE

## 2020-02-07 PROCEDURE — 99214 OFFICE O/P EST MOD 30 MIN: CPT | Performed by: FAMILY MEDICINE

## 2020-02-07 RX ORDER — BUPROPION HYDROCHLORIDE 300 MG/1
300 TABLET ORAL EVERY MORNING
Qty: 90 TABLET | Refills: 1 | Status: SHIPPED | OUTPATIENT
Start: 2020-02-07 | End: 2020-10-01

## 2020-02-07 ASSESSMENT — ANXIETY QUESTIONNAIRES
7. FEELING AFRAID AS IF SOMETHING AWFUL MIGHT HAPPEN: NOT AT ALL
2. NOT BEING ABLE TO STOP OR CONTROL WORRYING: NOT AT ALL
1. FEELING NERVOUS, ANXIOUS, OR ON EDGE: NOT AT ALL
6. BECOMING EASILY ANNOYED OR IRRITABLE: NOT AT ALL
3. WORRYING TOO MUCH ABOUT DIFFERENT THINGS: NOT AT ALL
4. TROUBLE RELAXING: NOT AT ALL
GAD7 TOTAL SCORE: 0
GAD7 TOTAL SCORE: 0
5. BEING SO RESTLESS THAT IT IS HARD TO SIT STILL: NOT AT ALL
GAD7 TOTAL SCORE: 0
7. FEELING AFRAID AS IF SOMETHING AWFUL MIGHT HAPPEN: NOT AT ALL

## 2020-02-07 ASSESSMENT — PAIN SCALES - GENERAL: PAINLEVEL: NO PAIN (0)

## 2020-02-08 ASSESSMENT — ANXIETY QUESTIONNAIRES: GAD7 TOTAL SCORE: 0

## 2020-02-09 ENCOUNTER — MYC MEDICAL ADVICE (OUTPATIENT)
Dept: FAMILY MEDICINE | Facility: OTHER | Age: 50
End: 2020-02-09

## 2020-02-09 DIAGNOSIS — J32.9 SINUSITIS, UNSPECIFIED CHRONICITY, UNSPECIFIED LOCATION: Primary | ICD-10-CM

## 2020-02-10 NOTE — TELEPHONE ENCOUNTER
TC : would you prescribe from last Friday OV or advise an appointment or e-visit?    Valdo Dowling, RN, BSN

## 2020-03-27 ENCOUNTER — TELEPHONE (OUTPATIENT)
Dept: FAMILY MEDICINE | Facility: OTHER | Age: 50
End: 2020-03-27

## 2020-03-27 NOTE — TELEPHONE ENCOUNTER
Spoke with patient and informed her she needed a visit in order to up her medication. Made a telephone visit for Monday with EM. Pt needed to be seen sooner than Tuesday she was ok seeing another provider. Pt had no further questions or concerns at this time.      Danae Lloyd CMA

## 2020-03-27 NOTE — TELEPHONE ENCOUNTER
Patient is requesting an early refill of her sertraline 50mg, stating she has been taking 2 (100mg) of these daily. She was under the impression she should be on 100mg per day (last office visit  & prescription stated 50mg/day). Patient states doing well on 100mg and would like to stay on that. Please send new prescription if you are okay with that.    Thank you,    Mariano Law, PharmD  [facility]   [phone]

## 2020-03-30 ENCOUNTER — VIRTUAL VISIT (OUTPATIENT)
Dept: FAMILY MEDICINE | Facility: OTHER | Age: 50
End: 2020-03-30
Payer: COMMERCIAL

## 2020-03-30 DIAGNOSIS — F32.1 MODERATE MAJOR DEPRESSION (H): Primary | ICD-10-CM

## 2020-03-30 DIAGNOSIS — F41.9 ANXIETY: ICD-10-CM

## 2020-03-30 PROCEDURE — 96127 BRIEF EMOTIONAL/BEHAV ASSMT: CPT | Performed by: STUDENT IN AN ORGANIZED HEALTH CARE EDUCATION/TRAINING PROGRAM

## 2020-03-30 PROCEDURE — 99214 OFFICE O/P EST MOD 30 MIN: CPT | Mod: TEL | Performed by: STUDENT IN AN ORGANIZED HEALTH CARE EDUCATION/TRAINING PROGRAM

## 2020-03-30 RX ORDER — SERTRALINE HYDROCHLORIDE 100 MG/1
100 TABLET, FILM COATED ORAL DAILY
Qty: 90 TABLET | Refills: 3 | Status: SHIPPED | OUTPATIENT
Start: 2020-03-30 | End: 2020-10-06

## 2020-03-30 ASSESSMENT — ANXIETY QUESTIONNAIRES
IF YOU CHECKED OFF ANY PROBLEMS ON THIS QUESTIONNAIRE, HOW DIFFICULT HAVE THESE PROBLEMS MADE IT FOR YOU TO DO YOUR WORK, TAKE CARE OF THINGS AT HOME, OR GET ALONG WITH OTHER PEOPLE: NOT DIFFICULT AT ALL
7. FEELING AFRAID AS IF SOMETHING AWFUL MIGHT HAPPEN: NOT AT ALL
1. FEELING NERVOUS, ANXIOUS, OR ON EDGE: NOT AT ALL
6. BECOMING EASILY ANNOYED OR IRRITABLE: NOT AT ALL
3. WORRYING TOO MUCH ABOUT DIFFERENT THINGS: NOT AT ALL
5. BEING SO RESTLESS THAT IT IS HARD TO SIT STILL: NOT AT ALL
GAD7 TOTAL SCORE: 0
2. NOT BEING ABLE TO STOP OR CONTROL WORRYING: NOT AT ALL

## 2020-03-30 ASSESSMENT — PATIENT HEALTH QUESTIONNAIRE - PHQ9
5. POOR APPETITE OR OVEREATING: NOT AT ALL
SUM OF ALL RESPONSES TO PHQ QUESTIONS 1-9: 0

## 2020-03-30 NOTE — PROGRESS NOTES
"Subjective     Hannah Krishna is a 50 year old female who is being evaluated via a billable telephone visit.      The patient has been notified of following:     \"This telephone visit will be conducted via a call between you and your physician/provider. We have found that certain health care needs can be provided without the need for a physical exam.  This service lets us provide the care you need with a short phone conversation.  If a prescription is necessary we can send it directly to your pharmacy.  If lab work is needed we can place an order for that and you can then stop by our lab to have the test done at a later time.    If during the course of the call the physician/provider feels a telephone visit is not appropriate, you will not be charged for this service.\"     Physician has received verbal consent for a Telephone Visit from the patient? Yes    Hannah Krishna complains of   Chief Complaint   Patient presents with     Depression     Anxiety       ALLERGIES  No known drug allergies      She tapered off of Lexapro and started sertraline. She has been taking 100 mg instead of 50 mg and thought that her PCP knew this. She feels her symptoms have been well controlled and would like to continue at the same dose.      PHQ 1/10/2020 2/7/2020 3/30/2020   PHQ-9 Total Score 9 0 0   Q9: Thoughts of better off dead/self-harm past 2 weeks Not at all Not at all Not at all     CHITRA-7 SCORE 1/10/2020 2/7/2020 3/30/2020   Total Score - - -   Total Score - 0 (minimal anxiety) -   Total Score 6 0 0       100 mg      Patient Active Problem List   Diagnosis     Major depressive disorder, recurrent episode, moderate (H)     Anxiety     IUD (intrauterine device) in place     Fusion of spine of cervical region     Past Surgical History:   Procedure Laterality Date     ARTHROSCOPY SHOULDER BICEPS TENODESIS REPAIR Right 4/8/2016    Procedure: ARTHROSCOPY SHOULDER BICEPS TENODESIS REPAIR;  Surgeon: Gilbert Cortes, DO;  " [facility]     ARTHROSCOPY SHOULDER DECOMPRESSION Right 2016    Procedure: ARTHROSCOPY SHOULDER DECOMPRESSION;  Surgeon: Gilbert Cortes DO;  [facility]     ARTHROSCOPY SHOULDER DISTAL CLAVICLE REPAIR Right 2016    Procedure: ARTHROSCOPY SHOULDER DISTAL CLAVICLE RESECTION;  Surgeon: Gilbert Cortes DO;  [facility]     ARTHROSCOPY SHOULDER, OPEN ROTATOR CUFF REPAIR, COMBINED Right 2016    Procedure: COMBINED ARTHROSCOPY SHOULDER, OPEN ROTATOR CUFF REPAIR;  Surgeon: Gilbert Cortes DO;  [facility]     BUNIONECTOMY  10/24/2013    Procedure: BUNIONECTOMY;;  Surgeon: Ariel Mcdermott DPM;  [facility]     C LIGATE FALLOPIAN TUBE,POSTPARTUM      Tubal Ligation     C NONSPECIFIC PROCEDURE      kidney stones removed     C NONSPECIFIC PROCEDURE      lasik surgery     C STRESS ECHO TEST NL  2005    neg (low probability of ischemic disease)     EXCISE EXOSTOSIS FOOT  10/24/2013    Procedure: EXCISE EXOSTOSIS FOOT;  left excision of accessory navicular, bunion repair with osteotomy, and navicular remodeling;  Surgeon: Ariel Mcdermott DPM;  [facility]     EYE SURGERY       [facility] RAD RX IODINE 123 SODIUM IODIDE  UCI, UP  UCI  10/2005    I 123 thryoid -WNL     HEAD & NECK SURGERY      Why is this one highlighted on yes already??     OSTEOTOMY FOOT  10/24/2013    Procedure: OSTEOTOMY FOOT;;  Surgeon: Ariel Mcdermott DPM;  [facility]       Social History     Tobacco Use     Smoking status: Former Smoker     Packs/day: 0.10     Years: 18.00     Pack years: 1.80     Types: Cigarettes     Last attempt to quit: 2014     Years since quittin.3     Smokeless tobacco: Never Used     Tobacco comment: since    Substance Use Topics     Alcohol use: Yes     Comment: weekends <2-4 drinks     Family History   Adopted: Yes   Problem Relation Age of Onset     Unknown/Adopted Mother      Unknown/Adopted Father       Unknown/Adopted Maternal Grandmother      Unknown/Adopted Maternal Grandfather      Unknown/Adopted Paternal Grandmother      Unknown/Adopted Paternal Grandfather          Current Outpatient Medications   Medication Sig Dispense Refill     buPROPion (WELLBUTRIN XL) 300 MG 24 hr tablet Take 1 tablet (300 mg) by mouth every morning 90 tablet 1     Cyanocobalamin (VITAMIN B12 PO)        levonorgestrel (MIRENA) 20 MCG/24HR IUD 1 each by Intrauterine route once       Pyridoxine HCl (VITAMIN B-6 PO)        sertraline (ZOLOFT) 100 MG tablet Take 1 tablet (100 mg) by mouth daily 90 tablet 3     vitamin D3 (CHOLECALCIFEROL) 2000 units tablet Take 1 tablet by mouth daily       Allergies   Allergen Reactions     No Known Drug Allergies      BP Readings from Last 3 Encounters:   02/07/20 126/70   01/10/20 126/74   12/09/19 120/72    Wt Readings from Last 3 Encounters:   02/07/20 95.3 kg (210 lb)   01/10/20 98 kg (216 lb)   12/09/19 100.1 kg (220 lb 11.2 oz)                    Reviewed and updated as needed this visit by Provider         Review of Systems   ROS COMP: Constitutional, HEENT, cardiovascular, pulmonary, gi and gu systems are negative, except as otherwise noted.       Objective   Reported vitals:  There were no vitals taken for this visit.   alert and no distress  Psych: Alert and oriented times 3; coherent speech, normal   rate and volume, able to articulate logical thoughts, able   to abstract reason, no tangential thoughts.     Diagnostic Test Results:  Labs reviewed in Epic        Assessment/Plan:  1. Moderate major depression (H)  Stable. Continue current medication(s) and dose(s).   - sertraline (ZOLOFT) 100 MG tablet; Take 1 tablet (100 mg) by mouth daily  Dispense: 90 tablet; Refill: 3    2. Anxiety  Stable. Continue current medication(s) and dose(s).   - sertraline (ZOLOFT) 100 MG tablet; Take 1 tablet (100 mg) by mouth daily  Dispense: 90 tablet; Refill: 3    No follow-ups on file.      Phone call duration:   5 minutes    Jenniffer Johnson, NP-C

## 2020-03-31 ASSESSMENT — ANXIETY QUESTIONNAIRES: GAD7 TOTAL SCORE: 0

## 2020-04-01 ENCOUNTER — TELEPHONE (OUTPATIENT)
Dept: FAMILY MEDICINE | Facility: OTHER | Age: 50
End: 2020-04-01

## 2020-08-28 ENCOUNTER — NURSE TRIAGE (OUTPATIENT)
Dept: FAMILY MEDICINE | Facility: OTHER | Age: 50
End: 2020-08-28

## 2020-08-28 RX ORDER — OMEGA-3 FATTY ACIDS/FISH OIL 300-1000MG
CAPSULE ORAL
Status: ON HOLD | COMMUNITY
End: 2022-03-02

## 2020-08-28 NOTE — PROGRESS NOTES
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 Pt reported to employer on 6/24/20.      Original incident happened on 06/16/2020.  She was working with another CNA, they were helping a resident who is over 200 lbs use the toilet.  They had the gait belt on her, she was in front of the resident and the other worker was behind her.  They were lifting her up and they had to pull her pants up as well and while the worker behind the resident was doing that the resident got worried her legs would go out so instead let go of the grab bar and grabbed around Hannah's neck with both hands.  They were able to get her put back together and back in her seat.  Once they left the room Hannah mentioned to her coworker that that hurt her back.      Since then she has been seeing Chiropractor regularly. Initially 3 days per week and now down to 2 days per week.  She was improving.  Initial x-rays showed no major concerns per patient which were done at chiropractor.  They perform manipulation and then 30 minute massage.  They have been slowly advancing her back to work.  Started with 4 hour shifts.  Then this last week switched to 8 hour shifts because her pain was improving.  But then she was forced to work 12 hour shift one day and that threw everything back to where it was when the pain initially started.      Pretty much any movement makes it worse.  She feels best if she sits very still and straight.    No numbness/tingling sensations.   More so on the right side than the left side.   She has a history of neck issues with surgeries but no issues with the upper and low back.   No chest pain, shortness of breath.  No bruising, rashes or skin changes.     Onset/Duration: 6/16/20 (2.5 months)   Description:   Location of  "pain: middle of back right  Character of pain: dull ache and \"pressure or stretching pain. It really hard to explain\"   Pain radiation: none  New numbness or weakness in legs, not attributed to pain: no   Intensity: Currently 6/10  Progression of Symptoms: worsening  History:   Specific cause: lifting  Pain interferes with job: YES  History of back problems: no prior back problems  Any previous MRI or X-rays: Yes- at Chiropractor.  Date pt don't recalled   Sees a specialist for back pain: No  Alleviating factors:   Improved by: chiropractor     Precipitating factors:  Worsened by: Lifting and Bending  Therapies tried and outcome: pt was put on work restriction via chiropractor, ibuprofen, tylenol and chiropractor. Iced and heat       Review of Systems   Constitutional, cardiovascular, pulmonary, gi and gu, msk, skin, neuro systems are negative, except as otherwise noted.      Objective    /88   Pulse 60   Temp 98.1  F (36.7  C) (Oral)   Resp 16   Ht 1.607 m (5' 3.27\")   Wt 100.5 kg (221 lb 8 oz)   LMP 08/23/2020   BMI 38.91 kg/m    Body mass index is 38.91 kg/m .  Physical Exam   GENERAL: healthy, alert and no distress  RESP: lungs clear to auscultation - no rales, rhonchi or wheezes  CV: regular rate and rhythm, normal S1 S2, no S3 or S4, no murmur, click or rub, no peripheral edema and peripheral pulses strong  MS: no gross musculoskeletal defects noted, no edema  SKIN: no suspicious lesions or rashes  NEURO: Normal strength and tone, mentation intact and speech normal  Comprehensive back pain exam:  Non-tender to palpation over the cervical, thoracic and lumbar spinous processes.  Tenderness to palpation over the right side just inferior to the scapula extending to the upper lumbar paraspinal region, mildly tender on the left side paraspinal region.    PSYCH: mentation appears normal, affect normal/bright    Xray -   THORACIC LUMBAR SPINE TWO VIEWS   8/31/2020 12:20 PM      HISTORY: Worker's comp " injury right greater than left lower thoracic,  upper lumbar. Acute right-sided thoracic back pain.     COMPARISON: None.     FINDINGS:  There are five non-rib bearing lumbar type vertebrae.  Evaluation at L5-S1 is limited due to overlying pelvis structures and  underpenetration. Degenerative changes are seen at T12-L1 with  anterior and lateral spondylotic spurring. Otherwise, the vertebral  bodies, pedicles, disc spaces, perivertebral soft tissues, alignment,  and sacroiliac joints are normal in appearance.  No evidence for  fracture.                                                                       IMPRESSION:    1. Mild-to-moderate degenerative disc disease at T12-L1.  2. Evaluation of the L5-S1 is limited due to underpenetration.  3. No acute fracture or malalignment.  4. Further evaluation with MRI lumbar spine may be helpful, as  clinically indicated.     SONALI KAT MD        Assessment & Plan     Hannah was seen today for work comp.    Diagnoses and all orders for this visit:    Acute right-sided thoracic back pain  -     XR Thoracic Lumbar Spine 2 Views; Future  -     cyclobenzaprine (FLEXERIL) 5 MG tablet; Take 1-2 tablets (5-10 mg) by mouth 3 times daily as needed for muscle spasms  -     MR Thoracic Spine w/o Contrast; Future  -     MR Lumbar Spine w/o Contrast; Future    Encounter related to worker's compensation claim  -     MR Thoracic Spine w/o Contrast; Future  -     MR Lumbar Spine w/o Contrast; Future      Concerned that her pain has been present over 2 months and has not completely resolved at this point and time.  It seems most consistent with muscle injury given the location but at this point recommend further evaluation.  She can continue with chiropractic care.  X-rays showed no immediate concerns.  Recommended MRI for further evaluation. Chiropractor has continued to write her restrictions.  Given cyclobenzaprine to use PRN for back pain, advised no driving or operating machinery and  risks with side effects.  Continue with tylenol/ibuprofen PRN.      Return in about 2 weeks (around 9/14/2020) for If not improving, sooner if worse or new concerns, pending imaging results. .     Options for treatment and follow-up care were reviewed with the patient and/or guardian. Patient and/or guardian engaged in the decision making process and verbalized understanding of the options discussed and agreed with the final plan.     Courtney Rockwell PA-C  [facility]

## 2020-08-28 NOTE — TELEPHONE ENCOUNTER
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 Has been seeing Chiropractor since then. Was feeling really good with improvements in symptoms until this week when she went back to 8 hour shift. Chiropractor was having her do 4 hour shifts upon return but deemed it ok to return to 8 when ache came back. Middle back pain,more right sided that she wouldn't rate on a pain scale. Ache is responding to tylenol and ibuprofen use along with heat/cold. Patient wanting to be seen as chiropractor suggested this with possible imaging. Patient denies pain, fever, loss of bowel/bladder,or radiation. Appt scheduled for Monday in clinic. Advised to be seen sooner with any worsening pain, loss of control, or radiation.    Shahana Turner RN          1. ONSET: \"When did the pain begin?\"       Back in June    2. LOCATION: \"Where does it hurt?\" (upper, mid or lower back)      Middle of back- per patient wear a bra strap lays- right sided    3. SEVERITY: \"How bad is the pain?\"  (e.g., Scale 1-10; mild, moderate, or severe)    - MILD (1-3): doesn't interfere with normal activities     - MODERATE (4-7): interferes with normal activities or awakens from sleep     - SEVERE (8-10): excruciating pain, unable to do any normal activities       Not pain more of a dull ache    4. PATTERN: \"Is the pain constant?\" (e.g., yes, no; constant, intermittent)       Constant this week    5. RADIATION: \"Does the pain shoot into your legs or elsewhere?\"      None    6. CAUSE:  \"What do you think is causing the back pain?\"       Injury at work    7. BACK OVERUSE:  \"Any recent lifting of heavy objects, strenuous work or exercise?\"      Yes- work, transferring residents    8. MEDICATIONS: \"What have you taken so far for the pain?\" (e.g., nothing, acetaminophen, NSAIDS)      Tylenol and ibuprofen with improvement of symptoms    9. NEUROLOGIC SYMPTOMS: \"Do you have any weakness, numbness, or problems " "with bowel/bladder control?\"      None    10. OTHER SYMPTOMS: \"Do you have any other symptoms?\" (e.g., fever, abdominal pain, burning with urination, blood in urine)        None    11. PREGNANCY: \"Is there any chance you are pregnant?\" (e.g., yes, no; LMP)        NA    Additional Information    Negative: Passed out (i.e., fainted, collapsed and was not responding)    Negative: Shock suspected (e.g., cold/pale/clammy skin, too weak to stand, low BP, rapid pulse)    Negative: Sounds like a life-threatening emergency to the triager    Negative: Major injury to the back (e.g., MVA, fall > 10 feet or 3 meters, penetrating injury, etc.)    Negative: Pain in the upper back over the ribs (rib cage) that radiates (travels) into the chest    Negative: Pain in the upper back over the ribs (rib cage) and worsened by coughing (or clearly increases with breathing)    Negative: SEVERE back pain of sudden onset and age > 60    Negative: SEVERE abdominal pain (e.g., excruciating)    Negative: Abdominal pain and age > 60    Negative: Unable to urinate (or only a few drops) and bladder feels very full    Negative: Loss of bladder or bowel control (urine or bowel incontinence; wetting self, leaking stool) of new onset    Negative: Numbness (loss of sensation) in groin or rectal area    Negative: Pain radiates into groin, scrotum    Negative: Blood in urine (red, pink, or tea-colored)    Negative: Vomiting and pain over lower ribs of back (i.e., flank - kidney area)    Negative: Weakness of a leg or foot (e.g., unable to bear weight, dragging foot)    Negative: Patient sounds very sick or weak to the triager    Negative: Fever > 100.5 F (38.1 C) and flank pain    Negative: Pain or burning with passing urine (urination)    Negative: SEVERE back pain (e.g., excruciating, unable to do any normal activities) and not improved after pain medicine and CARE ADVICE    Negative: Numbness in an arm or hand (i.e., loss of sensation) and upper back " pain    Negative: Numbness in a leg or foot (i.e., loss of sensation)    Negative: High-risk adult (e.g., history of cancer, history of HIV, or history of IV drug abuse)    Negative: Painful rash with multiple small blisters grouped together (i.e., dermatomal distribution or 'band' or 'stripe')    Negative: Pain radiates into the thigh or further down the leg, and in both legs    Negative: Age > 50 and no history of prior similar back pain    Negative: MODERATE back pain (e.g., interferes with normal activities) and present > 3 days    Negative: Pain radiates into the thigh or further down the leg    Negative: Patient wants to be seen    Back pain lasts > 2 weeks    Protocols used: BACK PAIN-A-[place]

## 2020-08-28 NOTE — TELEPHONE ENCOUNTER
Reason for call:  per patient: having pain + pressure in my back, is there anyway I can placed on a cancellation list to get in today? This will be under workers comp    Phone number to reach patient:  Cell number on file:    Telephone Information:   Mobile [phone]       Best Time:  Anytime    Can we leave a detailed message on this number?  YES    Travel screening: Not Applicable

## 2020-08-31 ENCOUNTER — ANCILLARY PROCEDURE (OUTPATIENT)
Dept: GENERAL RADIOLOGY | Facility: OTHER | Age: 50
End: 2020-08-31
Attending: PHYSICIAN ASSISTANT
Payer: OTHER MISCELLANEOUS

## 2020-08-31 ENCOUNTER — OFFICE VISIT (OUTPATIENT)
Dept: FAMILY MEDICINE | Facility: OTHER | Age: 50
End: 2020-08-31
Payer: OTHER MISCELLANEOUS

## 2020-08-31 VITALS
HEIGHT: 63 IN | HEART RATE: 60 BPM | SYSTOLIC BLOOD PRESSURE: 136 MMHG | WEIGHT: 221.5 LBS | TEMPERATURE: 98.1 F | RESPIRATION RATE: 16 BRPM | BODY MASS INDEX: 39.25 KG/M2 | DIASTOLIC BLOOD PRESSURE: 88 MMHG

## 2020-08-31 DIAGNOSIS — M54.6 ACUTE RIGHT-SIDED THORACIC BACK PAIN: Primary | ICD-10-CM

## 2020-08-31 DIAGNOSIS — M54.6 ACUTE RIGHT-SIDED THORACIC BACK PAIN: ICD-10-CM

## 2020-08-31 DIAGNOSIS — Z02.6 ENCOUNTER RELATED TO WORKER'S COMPENSATION CLAIM: ICD-10-CM

## 2020-08-31 PROCEDURE — 99213 OFFICE O/P EST LOW 20 MIN: CPT | Performed by: PHYSICIAN ASSISTANT

## 2020-08-31 PROCEDURE — 72080 X-RAY EXAM THORACOLMB 2/> VW: CPT

## 2020-08-31 RX ORDER — CYCLOBENZAPRINE HCL 5 MG
5-10 TABLET ORAL 3 TIMES DAILY PRN
Qty: 30 TABLET | Refills: 0 | Status: SHIPPED | OUTPATIENT
Start: 2020-08-31 | End: 2020-09-21

## 2020-08-31 ASSESSMENT — MIFFLIN-ST. JEOR: SCORE: 1603.1

## 2020-09-21 DIAGNOSIS — M54.6 ACUTE RIGHT-SIDED THORACIC BACK PAIN: ICD-10-CM

## 2020-09-21 RX ORDER — CYCLOBENZAPRINE HCL 5 MG
TABLET ORAL
Qty: 30 TABLET | Refills: 0 | Status: SHIPPED | OUTPATIENT
Start: 2020-09-21 | End: 2020-12-08

## 2020-09-24 ENCOUNTER — HOSPITAL ENCOUNTER (OUTPATIENT)
Dept: MRI IMAGING | Facility: CLINIC | Age: 50
End: 2020-09-24
Attending: PHYSICIAN ASSISTANT
Payer: OTHER MISCELLANEOUS

## 2020-09-24 DIAGNOSIS — Z02.6 ENCOUNTER RELATED TO WORKER'S COMPENSATION CLAIM: ICD-10-CM

## 2020-09-24 DIAGNOSIS — M54.6 ACUTE RIGHT-SIDED THORACIC BACK PAIN: ICD-10-CM

## 2020-09-24 DIAGNOSIS — M54.6 ACUTE RIGHT-SIDED THORACIC BACK PAIN: Primary | ICD-10-CM

## 2020-09-24 PROCEDURE — 72148 MRI LUMBAR SPINE W/O DYE: CPT

## 2020-09-24 PROCEDURE — 72146 MRI CHEST SPINE W/O DYE: CPT

## 2020-09-25 ENCOUNTER — TELEPHONE (OUTPATIENT)
Dept: FAMILY MEDICINE | Facility: OTHER | Age: 50
End: 2020-09-25

## 2020-09-25 NOTE — LETTER
[address]  [facility] [address]  Phone: [phone]    September 25, 2020        Hannah Krishna  [address] [address] [address]          To whom it may concern:    RE: Hannah Krishna    Recommend that Hannah continue with the restrictions that were put in place by her chiropractor provider until she has had a chance to meet with the specialist for further recommendations.     Please contact me for questions or concerns.      Sincerely,        Courtney Rockwell PA-C

## 2020-09-25 NOTE — LETTER
[address]  [facility] [address]  Phone: [phone]    September 25, 2020        Hannah Krishna  [address] [address] [place] [address]          To whom it may concern:    RE: Hannah Krishna    Patient was seen and treated at our clinic.  Please excuse from work 09/24/2020.     Please contact me for questions or concerns.      Sincerely,        Courtney Rockwell PA-C

## 2020-09-25 NOTE — TELEPHONE ENCOUNTER
Spoke to patient and informed her of ES note below. She is not needing the work excuse from the 9/24/2020. She will contact her chiropractic office to obtain work restriction letter (as they are the ones who initially wrote this) and she will request an extension through them. If they are not available she is wanting ES to write letter with work restrictions until she meets with neurosurgery. Will route to ES as an FYI.    Chelsea Grant MA

## 2020-09-25 NOTE — TELEPHONE ENCOUNTER
Patient is calling back, she just needs to know with the results she got from her MRI if she should be working?  If you think she should not be working then she needs a note.  Please call

## 2020-09-25 NOTE — TELEPHONE ENCOUNTER
Wrote letter saying would recommend we continue with chiropractors previous recommendations for restrictions.  Provider will be out the rest of the afternoon and will be available again on Monday.     Courtney Rockwell PA-C

## 2020-09-25 NOTE — TELEPHONE ENCOUNTER
Letter written.   Ok to work with the restrictions already in place.  Not entirely convinced all this is related to work, some of this could have been chronic as well.      BIANCA JohnsonC

## 2020-09-25 NOTE — TELEPHONE ENCOUNTER
Reason for Call:  Other Note    Detailed comments: Patient is requesting a note for work. She had an MRI yesterday. Should patient be working still or not? Patient has to work tomorrow and is wondering what to do. She is going to call the neurologist and get everything set up. Should she wait to see what the neurologist said or what should she do. Can someone give her a call. Thank you    Phone Number Patient can be reached at: Cell number on file:    Telephone Information:   Mobile [phone]       Best Time: anytime    Can we leave a detailed message on this number? YES    Call taken on 9/25/2020 at 1:21 PM by Lisa Rocha

## 2020-09-29 ENCOUNTER — OFFICE VISIT (OUTPATIENT)
Dept: NEUROSURGERY | Facility: CLINIC | Age: 50
End: 2020-09-29
Payer: OTHER MISCELLANEOUS

## 2020-09-29 VITALS
BODY MASS INDEX: 39.65 KG/M2 | WEIGHT: 223.8 LBS | OXYGEN SATURATION: 100 % | HEIGHT: 63 IN | DIASTOLIC BLOOD PRESSURE: 85 MMHG | SYSTOLIC BLOOD PRESSURE: 157 MMHG | HEART RATE: 82 BPM

## 2020-09-29 DIAGNOSIS — M54.6 ACUTE RIGHT-SIDED THORACIC BACK PAIN: Primary | ICD-10-CM

## 2020-09-29 PROCEDURE — 99213 OFFICE O/P EST LOW 20 MIN: CPT | Performed by: NURSE PRACTITIONER

## 2020-09-29 ASSESSMENT — MIFFLIN-ST. JEOR: SCORE: 1613.24

## 2020-09-29 ASSESSMENT — PAIN SCALES - GENERAL: PAINLEVEL: MODERATE PAIN (5)

## 2020-09-29 NOTE — PATIENT INSTRUCTIONS
-Injection ordered. They will contact you to schedule.  -May resume chiropractic therapy.  -Hannah to follow up with Primary Care provider regarding elevated blood pressure.

## 2020-09-29 NOTE — LETTER
9/29/2020         RE: Hannah Krishna  [address]  [facility] [address]        Dear Colleague,    Thank you for referring your patient, Hannah Krishna, to the [facility]. Please see a copy of my visit note below.    [facility]  Neurosurgery Clinic Visit      CC: right-sided mid back pain    Primary care Provider: Merlene Paz      Reason For Visit:   I was asked by Moiz Valdez PA-C to consult on the patient for acute right-sided thoracic back pain.      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 Today she reports right-sided thoracic back pain. She denies radicular pain and weakness. She has some associated intermittent tingling. She denies bowel/bladder complaints and foot drop. She states symptoms are worse with activities and work. Symptoms are improved with rest and muscle relaxers. She has tried chiropractic therapy in the past with benefit. She had an MRI, but has not had any injections or surgery.    Pain right now:  5    Past Medical History:   Diagnosis Date     Chest pain, unspecified 11/2005    atypical - stress echo neg     Chronic depressive personality disorder      Esophageal reflux 12/11/2013     Headache 7/31/2013     Hypertension      Lateral epicondylitis 7/28/2015     Malaise and fatigue 8/1/2005     Obesity, unspecified      Other malaise and fatigue 8/2005     Polycystic ovaries     per pt     Sleep disturbance, unspecified 10/2005    see sleep study     Unspecified disorder of thyroid 9/2005    thyroid nodule. I-123 WNL.       Past Medical History reviewed with patient during visit.    Past Surgical History:   Procedure Laterality Date     ARTHROSCOPY SHOULDER BICEPS TENODESIS REPAIR Right 4/8/2016    Procedure: ARTHROSCOPY SHOULDER BICEPS TENODESIS REPAIR;  Surgeon: Gilbert Cortes DO;  Location: [facility]     ARTHROSCOPY  SHOULDER DECOMPRESSION Right 4/8/2016    Procedure: ARTHROSCOPY SHOULDER DECOMPRESSION;  Surgeon: Gilbert Cortes DO;  Location: [facility]     ARTHROSCOPY SHOULDER DISTAL CLAVICLE REPAIR Right 4/8/2016    Procedure: ARTHROSCOPY SHOULDER DISTAL CLAVICLE RESECTION;  Surgeon: Gilbert Cortes DO;  Location: [facility]     ARTHROSCOPY SHOULDER, OPEN ROTATOR CUFF REPAIR, COMBINED Right 4/8/2016    Procedure: COMBINED ARTHROSCOPY SHOULDER, OPEN ROTATOR CUFF REPAIR;  Surgeon: Gilbert Cortes DO;  Location: [facility]     BUNIONECTOMY  10/24/2013    Procedure: BUNIONECTOMY;;  Surgeon: Ariel Mcdermott DPM;  Location: [facility]     C LIGATE FALLOPIAN TUBE,POSTPARTUM  1998    Tubal Ligation     C NONSPECIFIC PROCEDURE  2002    kidney stones removed     C NONSPECIFIC PROCEDURE  060503    lasik surgery     C STRESS ECHO TEST NL  11/2005    neg (low probability of ischemic disease)     EXCISE EXOSTOSIS FOOT  10/24/2013    Procedure: EXCISE EXOSTOSIS FOOT;  left excision of accessory navicular, bunion repair with osteotomy, and navicular remodeling;  Surgeon: Ariel Mcdermott DPM;  Location: [facility]     EYE SURGERY       HC RAD RX IODINE 123 SODIUM IODIDE  UCI, UP  UCI  10/2005    I 123 thryoid -WNL     HEAD & NECK SURGERY      Why is this one highlighted on yes already??     OSTEOTOMY FOOT  10/24/2013    Procedure: OSTEOTOMY FOOT;;  Surgeon: Ariel Mcdermott DPM;  Location: [facility]     Past Surgical History reviewed with patient during visit.    Current Outpatient Medications   Medication     buPROPion (WELLBUTRIN XL) 300 MG 24 hr tablet     Cyanocobalamin (VITAMIN B12 PO)     cyclobenzaprine (FLEXERIL) 5 MG tablet     ibuprofen (ADVIL/MOTRIN) 200 MG capsule     levonorgestrel (MIRENA) 20 MCG/24HR IUD     Pyridoxine HCl (VITAMIN B-6 PO)     sertraline (ZOLOFT) 100 MG tablet     vitamin D3 (CHOLECALCIFEROL) 2000 units tablet     No current facility-administered medications for this visit.         Allergies   Allergen Reactions     No Known Drug Allergies        Social History     Socioeconomic History     Marital status:      Spouse name: None     Number of children: 2     Years of education: 12     Highest education level: None   Occupational History     Occupation: [employer]     Employer: [employer]     Employer: [employer]   Social [employer]     Financial resource strain: None     Food insecurity     Worry: None     Inability: None     Transportation needs     Medical: None     Non-medical: None   Tobacco Use     Smoking status: Former Smoker     Packs/day: 0.10     Years: 18.00     Pack years: 1.80     Types: Cigarettes     Last attempt to quit: 2014     Years since quittin.8     Smokeless tobacco: Never Used     Tobacco comment: since    Substance and Sexual Activity     Alcohol use: Yes     Comment: weekends <2-4 drinks     Drug use: No     Sexual activity: Yes     Partners: Male     Birth control/protection: Surgical, I.U.D.     Comment: tubal ligation   Lifestyle     Physical activity     Days per week: None     Minutes per session: None     Stress: None   Relationships     Social connections     Talks on phone: None     Gets together: None     Attends Orthodoxy service: None     Active member of club or organization: None     Attends meetings of clubs or organizations: None     Relationship status: None     Intimate partner violence     Fear of current or ex partner: None     Emotionally abused: None     Physically abused: None     Forced sexual activity: None   Other Topics Concern     Parent/sibling w/ CABG, MI or angioplasty before 65F 55M? No   Social History Narrative    Denies domestic violence issues.       Family History   Adopted: Yes   Problem Relation Age of Onset     Unknown/Adopted Mother      Unknown/Adopted Father      Unknown/Adopted Maternal Grandmother      Unknown/Adopted Maternal Grandfather      Unknown/Adopted Paternal Grandmother      Unknown/Adopted  "Paternal Grandfather          ROS: 10 point ROS neg other than the symptoms noted above in the HPI.    Vital Signs:   BP (!) 157/85   Pulse 82   Ht 5' 3.25\" (1.607 m)   Wt 223 lb 12.8 oz (101.5 kg)   SpO2 100%   BMI 39.33 kg/m        Examination:  Constitutional:  Alert, well nourished, NAD.  Memory: recent and remote memory   HEENT: Normocephalic, atraumatic.   Pulm:  Without shortness of breath   CV:  No pitting edema of BLE.      Neurological:  Awake  Alert  Oriented x 3  Speech clear  Tongue midline    Motor exam:  Hip Flexor:                Right: 5/5  Left:  5/5  Hip Adductor:             Right:  5/5  Left:  5/5  Hip Abductor:             Right:  5/5  Left:  5/5  Gastroc Soleus:        Right:  5/5  Left:  5/5  Tib/Ant:                      Right:  5/5  Left:  5/5  EHL:                          Right:  5/5  Left:  5/5     Sensation normal to bilateral upper and lower extremities  Muscle tone to bilateral upper and lower extremities   Gait: Able to stand from a seated position. Normal non-antalgic, non-myelopathic gait.  Able to heel/toe walk without loss of balance    Thoracic/lumbar examination reveals mid thoracic right-sided tenderness.  Hip height is symmetrical. Negative SI joint, sciatic notch or greater trochanteric tenderness to palpation bilaterally.  Straight leg raise is negative bilaterally.      Imaging:   Thoracic MRI 9/24/2020  IMPRESSION: Multilevel degenerative changes. There are thoracic disc herniations at multiple levels.    Lumbar MRI 9/24/2020  IMPRESSION:    1. Mild degenerative changes.  2. 6 cm simple cyst anterior cortex right kidney.    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 Today she reports right-sided thoracic back pain. She denies radicular pain and weakness. She has some associated intermittent tingling. She denies bowel/bladder complaints and foot " drop. Discussed MRI in clinic.  Recommending continuation of therapies at this time and a trigger point injection. She inquires about the cyst noted in her MRI report. Recommended her to follow up with her PCP regarding this. She verbalized understanding and agreement.    Patient Instructions   -Injection ordered. They will contact you to schedule.  -May resume chiropractic therapy.  -Hannah to follow up with Primary Care provider regarding elevated blood pressure.      Lynnette Membreno CNP  [facility]  [address] [place] [id number]  Tel [phone]  Fax [phone]      Again, thank you for allowing me to participate in the care of your patient.        Sincerely,        Lynnette Membreno, GABBIE

## 2020-09-29 NOTE — PROGRESS NOTES
[facility] Neurosurgery  Neurosurgery Clinic Visit      CC: right-sided mid back pain    Primary care Provider: Merlene Paz      Reason For Visit:   I was asked by Moiz Valdez PA-C to consult on the patient for acute right-sided thoracic back pain.      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 Today she reports right-sided thoracic back pain. She denies radicular pain and weakness. She has some associated intermittent tingling. She denies bowel/bladder complaints and foot drop. She states symptoms are worse with activities and work. Symptoms are improved with rest and muscle relaxers. She has tried chiropractic therapy in the past with benefit. She had an MRI, but has not had any injections or surgery.    Pain right now:  5    Past Medical History:   Diagnosis Date     Chest pain, unspecified 11/2005    atypical - stress echo neg     Chronic depressive personality disorder      Esophageal reflux 12/11/2013     Headache 7/31/2013     Hypertension      Lateral epicondylitis 7/28/2015     Malaise and fatigue 8/1/2005     Obesity, unspecified      Other malaise and fatigue 8/2005     Polycystic ovaries     per pt     Sleep disturbance, unspecified 10/2005    see sleep study     Unspecified disorder of thyroid 9/2005    thyroid nodule. I-123 WNL.       Past Medical History reviewed with patient during visit.    Past Surgical History:   Procedure Laterality Date     ARTHROSCOPY SHOULDER BICEPS TENODESIS REPAIR Right 4/8/2016    Procedure: ARTHROSCOPY SHOULDER BICEPS TENODESIS REPAIR;  Surgeon: Gilbert Cortes DO;  Location: [facility]     ARTHROSCOPY SHOULDER DECOMPRESSION Right 4/8/2016    Procedure: ARTHROSCOPY SHOULDER DECOMPRESSION;  Surgeon: Gilbert Cortes DO;  Location: [facility]     ARTHROSCOPY SHOULDER DISTAL CLAVICLE REPAIR Right 4/8/2016    Procedure: ARTHROSCOPY  SHOULDER DISTAL CLAVICLE RESECTION;  Surgeon: Gilbert Cortes DO;  Location: [facility]     ARTHROSCOPY SHOULDER, OPEN ROTATOR CUFF REPAIR, COMBINED Right 4/8/2016    Procedure: COMBINED ARTHROSCOPY SHOULDER, OPEN ROTATOR CUFF REPAIR;  Surgeon: Gilbert Cortes DO;  Location[facility]     BUNIONECTOMY  10/24/2013    Procedure: BUNIONECTOMY;;  Surgeon: Ariel Mcdermott DPM;  Location: [facility]     C LIGATE FALLOPIAN TUBE,POSTPARTUM  1998    Tubal Ligation     C NONSPECIFIC PROCEDURE  2002    kidney stones removed     C NONSPECIFIC PROCEDURE  675978    lasik surgery     C STRESS ECHO TEST NL  11/2005    neg (low probability of ischemic disease)     EXCISE EXOSTOSIS FOOT  10/24/2013    Procedure: EXCISE EXOSTOSIS FOOT;  left excision of accessory navicular, bunion repair with osteotomy, and navicular remodeling;  Surgeon: Ariel Mcdermott DPM;  Location: [facility]     EYE SURGERY       [facility] RAD RX IODINE 123 SODIUM IODIDE  UCI, UP  UCI  10/2005    I 123 thryoid -WNL     HEAD & NECK SURGERY      Why is this one highlighted on yes already??     OSTEOTOMY FOOT  10/24/2013    Procedure: OSTEOTOMY FOOT;;  Surgeon: Ariel Mcdermott DPM;  Location: [facility]     Past Surgical History reviewed with patient during visit.    Current Outpatient Medications   Medication     buPROPion (WELLBUTRIN XL) 300 MG 24 hr tablet     Cyanocobalamin (VITAMIN B12 PO)     cyclobenzaprine (FLEXERIL) 5 MG tablet     ibuprofen (ADVIL/MOTRIN) 200 MG capsule     levonorgestrel (MIRENA) 20 MCG/24HR IUD     Pyridoxine HCl (VITAMIN B-6 PO)     sertraline (ZOLOFT) 100 MG tablet     vitamin D3 (CHOLECALCIFEROL) 2000 units tablet     No current facility-administered medications for this visit.        Allergies   Allergen Reactions     No Known Drug Allergies        Social History     Socioeconomic History     Marital status:      Spouse name: None     Number of children: 2     Years of education: 12     Highest education  "level: None   Occupational History     Occupation: [employer]     Employer: [employer]     Employer: [employer]   Social Needs     Financial resource strain: None     Food insecurity     Worry: None     Inability: None     Transportation needs     Medical: None     Non-medical: None   Tobacco Use     Smoking status: Former Smoker     Packs/day: 0.10     Years: 18.00     Pack years: 1.80     Types: Cigarettes     Last attempt to quit: 2014     Years since quittin.8     Smokeless tobacco: Never Used     Tobacco comment: since    Substance and Sexual Activity     Alcohol use: Yes     Comment: weekends <2-4 drinks     Drug use: No     Sexual activity: Yes     Partners: Male     Birth control/protection: Surgical, I.U.D.     Comment: tubal ligation   Lifestyle     Physical activity     Days per week: None     Minutes per session: None     Stress: None   Relationships     Social connections     Talks on phone: None     Gets together: None     Attends Baptism service: None     Active member of club or organization: None     Attends meetings of clubs or organizations: None     Relationship status: None     Intimate partner violence     Fear of current or ex partner: None     Emotionally abused: None     Physically abused: None     Forced sexual activity: None   Other Topics Concern     Parent/sibling w/ CABG, MI or angioplasty before 65F 55M? No   Social History Narrative    Denies domestic violence issues.       Family History   Adopted: Yes   Problem Relation Age of Onset     Unknown/Adopted Mother      Unknown/Adopted Father      Unknown/Adopted Maternal Grandmother      Unknown/Adopted Maternal Grandfather      Unknown/Adopted Paternal Grandmother      Unknown/Adopted Paternal Grandfather          ROS: 10 point ROS neg other than the symptoms noted above in the HPI.    Vital Signs:   BP (!) 157/85   Pulse 82   Ht 5' 3.25\" (1.607 m)   Wt 223 lb 12.8 oz (101.5 kg)   SpO2 100%   BMI 39.33 kg/m  "       Examination:  Constitutional:  Alert, well nourished, NAD.  Memory: recent and remote memory   HEENT: Normocephalic, atraumatic.   Pulm:  Without shortness of breath   CV:  No pitting edema of BLE.      Neurological:  Awake  Alert  Oriented x 3  Speech clear  Tongue midline    Motor exam:  Hip Flexor:                Right: 5/5  Left:  5/5  Hip Adductor:             Right:  5/5  Left:  5/5  Hip Abductor:             Right:  5/5  Left:  5/5  Gastroc Soleus:        Right:  5/5  Left:  5/5  Tib/Ant:                      Right:  5/5  Left:  5/5  EHL:                          Right:  5/5  Left:  5/5     Sensation normal to bilateral upper and lower extremities  Muscle tone to bilateral upper and lower extremities   Gait: Able to stand from a seated position. Normal non-antalgic, non-myelopathic gait.  Able to heel/toe walk without loss of balance    Thoracic/lumbar examination reveals mid thoracic right-sided tenderness.  Hip height is symmetrical. Negative SI joint, sciatic notch or greater trochanteric tenderness to palpation bilaterally.  Straight leg raise is negative bilaterally.      Imaging:   Thoracic MRI 9/24/2020  IMPRESSION: Multilevel degenerative changes. There are thoracic disc herniations at multiple levels.    Lumbar MRI 9/24/2020  IMPRESSION:    1. Mild degenerative changes.  2. 6 cm simple cyst anterior cortex right kidney.    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 Today she reports right-sided thoracic back pain. She denies radicular pain and weakness. She has some associated intermittent tingling. She denies bowel/bladder complaints and foot drop. Discussed MRI in clinic.  Recommending continuation of therapies at this time and a trigger point injection. She inquires about the cyst noted in her MRI report. Recommended her to follow up with her PCP regarding this. She  verbalized understanding and agreement.    Patient Instructions   -Injection ordered. They will contact you to schedule.  -May resume chiropractic therapy.  -Hannah to follow up with Primary Care provider regarding elevated blood pressure.      Lynnette Membreno [facility] Neurosurgery  [address] [place] [id number]  Tel [phone]  Fax [phone]

## 2020-09-29 NOTE — NURSING NOTE
"Hannah Krishna is a 50 year old female who presents for:  Chief Complaint   Patient presents with     Neurologic Problem     W/C. Right side thoracic back pain. DOI 6/16/20. Referred by Grace Rockwell. MRI 9/24/20. Xray 8/31/20. Patient notes she was lifting a gal off the toilet and into a wheelchair. Patient had a gait belt on but the patient put her arns around her neck so she had all patient's weight on her. The other girl helping had to pull up patient's pants. She felt something in her back. Pain is in the middle of the back on the right side. No pain in leg. She has a tingling like feeling in the middle of the back, right     Musculoskeletal Problem     side. She has a pulling sensation. She started with a chiropractor.         Vitals:    Vitals:    09/29/20 1316   BP: (!) 157/85   Pulse: 82   SpO2: 100%   Weight: 223 lb 12.8 oz (101.5 kg)   Height: 5' 3.25\" (1.607 m)       BMI:  Estimated body mass index is 39.33 kg/m  as calculated from the following:    Height as of this encounter: 5' 3.25\" (1.607 m).    Weight as of this encounter: 223 lb 12.8 oz (101.5 kg).    Pain Score:  Moderate Pain (5)        Michelle Arias CMA      "

## 2020-09-30 DIAGNOSIS — F33.1 MAJOR DEPRESSIVE DISORDER, RECURRENT EPISODE, MODERATE (H): ICD-10-CM

## 2020-10-01 RX ORDER — BUPROPION HYDROCHLORIDE 300 MG/1
300 TABLET ORAL EVERY MORNING
Qty: 90 TABLET | Refills: 1 | Status: SHIPPED | OUTPATIENT
Start: 2020-10-01 | End: 2021-01-26

## 2020-10-02 NOTE — PROGRESS NOTES
"Hannah Krishna is a 50 year old female who is being evaluated via a billable telephone visit.      The patient has been notified of following:     \"This telephone visit will be conducted via a call between you and your physician/provider. We have found that certain health care needs can be provided without the need for a physical exam.  This service lets us provide the care you need with a short phone conversation.  If a prescription is necessary we can send it directly to your pharmacy.  If lab work is needed we can place an order for that and you can then stop by our lab to have the test done at a later time.    Telephone visits are billed at different rates depending on your insurance coverage. During this emergency period, for some insurers they may be billed the same as an in-person visit.  Please reach out to your insurance provider with any questions.    If during the course of the call the physician/provider feels a telephone visit is not appropriate, you will not be charged for this service.\"    Patient has given verbal consent for Telephone visit?  Yes    What phone number would you like to be contacted at? [phone]    How would you like to obtain your AVS? Johnhart    Subjective     Hannah Krishna is a 50 year old female who presents via phone visit today for the following health issues:    HPI     Depression and Anxiety Follow-Up  Stopped sertraline last Friday,  states that she was crabby and irritable.  Stopped it as she forgot to take it, not sure if she feels any better. Was sweating a lot and now she wonders if it was the sertraline instead.    How are you doing with your depression since your last visit? No change    How are you doing with your anxiety since your last visit?  No change    Are you having other symptoms that might be associated with depression or anxiety? Yes:  increased irritablity on the sertraline     Have you had a significant life event? No     Do you have any concerns " with your use of alcohol or other drugs? No    States having issues with back under work comp, seeing a different provider.    Has URI symptoms for a few days.  Had Covid test yesterday for work.  She states she has been working the last couple days.  She complains of nasal congestion, postnasal drip, yellowish snot.  Denies shortness of breath or cough.  Denies fever.    Social History     Tobacco Use     Smoking status: Former Smoker     Packs/day: 0.10     Years: 18.00     Pack years: 1.80     Types: Cigarettes     Quit date: 2014     Years since quittin.9     Smokeless tobacco: Never Used     Tobacco comment: since    Substance Use Topics     Alcohol use: Yes     Comment: weekends <2-4 drinks     Drug use: No     PHQ 1/10/2020 2020 3/30/2020   PHQ-9 Total Score 9 0 0   Q9: Thoughts of better off dead/self-harm past 2 weeks Not at all Not at all Not at all     CHITRA-7 SCORE 1/10/2020 2020 3/30/2020   Total Score - - -   Total Score - 0 (minimal anxiety) -   Total Score 6 0 0       Suicide Assessment Five-step Evaluation and Treatment (SAFE-T)         Objective          Vitals:  No vitals were obtained today due to virtual visit.    General:  alert and no distress  PSYCH: Alert and oriented times 3; coherent speech, normal   rate and volume, able to articulate logical thoughts, able   to abstract reason, no tangential thoughts, no hallucinations   or delusions  Her affect is normal  RESP: No cough, no audible wheezing, able to talk in full sentences  Remainder of exam unable to be completed due to telephone visits          Assessment/Plan:    Assessment & Plan     Major depressive disorder, recurrent episode, moderate (H)  She had stopped Effexor in the past that she felt it was not effective and was causing increased sweating.  She then had done well on Lexapro until she felt it was no longer effective.  She was started in sertraline 9 months ago but found that she was more irritable.  She  continues on bupropion.  She has now been off Zoloft for a few days.  Will restart Lexapro to see if she has a good response that she has in the past.  We will follow-up in 6 weeks.  If she is not improving would then consider psychiatry consult.    - escitalopram (LEXAPRO) 10 MG tablet; Take 1 tablet (10 mg) by mouth daily    Upper respiratory tract infection, unspecified type  Patient has had a few days of symptoms and already has had a COVID test.  Discussed symptomatic cares per viral infection including rest, fluids, steam and Flonase.  If her symptoms worsen instead of improve over the next week would then consider virtual visit to discuss possible sinus infection.           Return in about 6 weeks (around 11/17/2020) for depression follow up.    Merlene Paz MD  [facility]    Phone call duration:  11 minutes

## 2020-10-06 ENCOUNTER — VIRTUAL VISIT (OUTPATIENT)
Dept: FAMILY MEDICINE | Facility: OTHER | Age: 50
End: 2020-10-06
Payer: COMMERCIAL

## 2020-10-06 DIAGNOSIS — J06.9 UPPER RESPIRATORY TRACT INFECTION, UNSPECIFIED TYPE: ICD-10-CM

## 2020-10-06 DIAGNOSIS — F33.1 MAJOR DEPRESSIVE DISORDER, RECURRENT EPISODE, MODERATE (H): Primary | ICD-10-CM

## 2020-10-06 PROCEDURE — 99213 OFFICE O/P EST LOW 20 MIN: CPT | Mod: 95 | Performed by: FAMILY MEDICINE

## 2020-10-06 RX ORDER — ESCITALOPRAM OXALATE 10 MG/1
10 TABLET ORAL DAILY
Qty: 90 TABLET | Refills: 0 | Status: SHIPPED | OUTPATIENT
Start: 2020-10-06 | End: 2020-11-13

## 2020-10-06 ASSESSMENT — ANXIETY QUESTIONNAIRES
7. FEELING AFRAID AS IF SOMETHING AWFUL MIGHT HAPPEN: NOT AT ALL
IF YOU CHECKED OFF ANY PROBLEMS ON THIS QUESTIONNAIRE, HOW DIFFICULT HAVE THESE PROBLEMS MADE IT FOR YOU TO DO YOUR WORK, TAKE CARE OF THINGS AT HOME, OR GET ALONG WITH OTHER PEOPLE: SOMEWHAT DIFFICULT
GAD7 TOTAL SCORE: 1
1. FEELING NERVOUS, ANXIOUS, OR ON EDGE: NOT AT ALL
3. WORRYING TOO MUCH ABOUT DIFFERENT THINGS: NOT AT ALL
5. BEING SO RESTLESS THAT IT IS HARD TO SIT STILL: NOT AT ALL
6. BECOMING EASILY ANNOYED OR IRRITABLE: SEVERAL DAYS
2. NOT BEING ABLE TO STOP OR CONTROL WORRYING: NOT AT ALL

## 2020-10-06 ASSESSMENT — PATIENT HEALTH QUESTIONNAIRE - PHQ9
SUM OF ALL RESPONSES TO PHQ QUESTIONS 1-9: 2
5. POOR APPETITE OR OVEREATING: NOT AT ALL

## 2020-10-07 ENCOUNTER — TELEPHONE (OUTPATIENT)
Facility: CLINIC | Age: 50
End: 2020-10-07

## 2020-10-07 DIAGNOSIS — Z11.59 ENCOUNTER FOR SCREENING FOR OTHER VIRAL DISEASES: Primary | ICD-10-CM

## 2020-10-07 ASSESSMENT — ANXIETY QUESTIONNAIRES: GAD7 TOTAL SCORE: 1

## 2020-10-07 NOTE — TELEPHONE ENCOUNTER
Pt scheduled for Trigger point injection   Date: 10/30/20  Time: 945 am  Dr. Gibbons    Patient informed of COVID testing process.

## 2020-10-27 DIAGNOSIS — Z11.59 ENCOUNTER FOR SCREENING FOR OTHER VIRAL DISEASES: ICD-10-CM

## 2020-10-27 PROCEDURE — U0003 INFECTIOUS AGENT DETECTION BY NUCLEIC ACID (DNA OR RNA); SEVERE ACUTE RESPIRATORY SYNDROME CORONAVIRUS 2 (SARS-COV-2) (CORONAVIRUS DISEASE [COVID-19]), AMPLIFIED PROBE TECHNIQUE, MAKING USE OF HIGH THROUGHPUT TECHNOLOGIES AS DESCRIBED BY CMS-2020-01-R: HCPCS | Performed by: ANESTHESIOLOGY

## 2020-10-28 LAB
SARS-COV-2 RNA SPEC QL NAA+PROBE: NOT DETECTED
SPECIMEN SOURCE: NORMAL

## 2020-10-30 ENCOUNTER — HOSPITAL ENCOUNTER (OUTPATIENT)
Dept: GENERAL RADIOLOGY | Facility: CLINIC | Age: 50
End: 2020-10-30
Attending: ANESTHESIOLOGY
Payer: OTHER MISCELLANEOUS

## 2020-10-30 ENCOUNTER — HOSPITAL ENCOUNTER (OUTPATIENT)
Facility: CLINIC | Age: 50
Discharge: HOME | End: 2020-10-30
Attending: ANESTHESIOLOGY | Admitting: ANESTHESIOLOGY
Payer: OTHER MISCELLANEOUS

## 2020-10-30 VITALS
HEART RATE: 63 BPM | SYSTOLIC BLOOD PRESSURE: 156 MMHG | DIASTOLIC BLOOD PRESSURE: 92 MMHG | OXYGEN SATURATION: 99 % | RESPIRATION RATE: 16 BRPM | TEMPERATURE: 98.2 F

## 2020-10-30 DIAGNOSIS — M54.6 ACUTE RIGHT-SIDED THORACIC BACK PAIN: ICD-10-CM

## 2020-10-30 PROCEDURE — 62321 NJX INTERLAMINAR CRV/THRC: CPT | Performed by: ANESTHESIOLOGY

## 2020-10-30 PROCEDURE — 999N000179 XR SURGERY CARM FLUORO LESS THAN 5 MIN W STILLS: Mod: TC

## 2020-10-30 PROCEDURE — 20553 NJX 1/MLT TRIGGER POINTS 3/>: CPT | Performed by: ANESTHESIOLOGY

## 2020-10-30 PROCEDURE — 250N000009 HC RX 250: Performed by: ANESTHESIOLOGY

## 2020-10-30 PROCEDURE — 250N000011 HC RX IP 250 OP 636: Performed by: ANESTHESIOLOGY

## 2020-10-30 PROCEDURE — 20552 NJX 1/MLT TRIGGER POINT 1/2: CPT | Performed by: ANESTHESIOLOGY

## 2020-10-30 PROCEDURE — 20550 NJX 1 TENDON SHEATH/LIGAMENT: CPT | Performed by: ANESTHESIOLOGY

## 2020-10-30 RX ORDER — TRIAMCINOLONE ACETONIDE 40 MG/ML
INJECTION, SUSPENSION INTRA-ARTICULAR; INTRAMUSCULAR PRN
Status: DISCONTINUED | OUTPATIENT
Start: 2020-10-30 | End: 2020-10-30 | Stop reason: HOSPADM

## 2020-10-30 RX ORDER — IOPAMIDOL 612 MG/ML
INJECTION, SOLUTION INTRATHECAL PRN
Status: DISCONTINUED | OUTPATIENT
Start: 2020-10-30 | End: 2020-10-30 | Stop reason: HOSPADM

## 2020-10-30 RX ORDER — FEXOFENADINE HCL 180 MG/1
180 TABLET ORAL DAILY
COMMUNITY
End: 2022-03-24

## 2020-10-30 RX ORDER — LIDOCAINE HYDROCHLORIDE 20 MG/ML
INJECTION, SOLUTION INFILTRATION; PERINEURAL PRN
Status: DISCONTINUED | OUTPATIENT
Start: 2020-10-30 | End: 2020-10-30 | Stop reason: HOSPADM

## 2020-10-30 NOTE — DISCHARGE INSTRUCTIONS
Home Care Instructions                Procedure: Epidural injection or joint injection    Activity:    Rest today    Do not work today    Resume normal activity tomorrow    Pain:    You may experience soreness at the injection site for 1 to 3 days.    You may use an ice pack for 20 minutes every 2 hours for the first 24 hours    You may use a heating pad after the first 24 hours    You may use Tylenol  (acetaminophen) every 4 hours or other pain medicines as directed by your physician    Safety  Sedation medicine, if given may remain active for many hours.    It is important for the next 24 hours that you do not:    Drive a car    Operate machines or power tools    Consume alcohol, including beer    Sign any important papers or legal documents    You may experience numbness radiating into your legs or arms, (depending on the procedure location)  This numbness may last several hours.  Until the numb sensation returns to normal please use caution in walking, climbing stairs, stepping out of your vehicle, etc.    Common side effects of steroids:  Not everyone will experience corticosteroid side effects. If side effects are experienced they will gradually subside in the 7-10 day period following an injection.    Most common side effects include:    Flushed face and/or chest    Feeling of warmth, particularly in face but could be overall feeling of warmth    Increased blood sugar in diabetic patients    Menstrual irregularities may occur.  If taking hormone based birth control an alternate method of birth control is recommended    Sleep disturbances and/or mood swings are possible    Leg cramps    Please contact us if you have:  Severe pain   Fever more than 101.5 degrees Fahrenheit  Signs of infection (redness, swelling or drainage)      If you have questions during normal business hours (8am-5pm Monday-Friday) contact the [facility] at [phone]. If you need help after hours, we recommend that you go to a  hospital emergency room or dial 911.

## 2020-10-30 NOTE — OP NOTE
Preoperative diagnosis: Thoracic myositis/myofascial pain    Postoperative diagnosis: Same as preoperative diagnosis    Blood loss: Less than 1 cc    Procedure: Thoracic longissimus, thoracic multifidus, and thoracisis trigger point injections under fluoroscopic guidance    Technique: After written and verbal informed consent was obtained including risks of infection, bleeding, no help, or worse pain she was brought to the procedure area placed in the prone position.  Preoperatively I had marked out the location in her back where she had most pain with palpation where the trigger points existed.  This correlated to her right T10, T11, T12 segments off to the right side.  I then prepped the area with ChloraPrep.  I then anesthetize her skin with 1% lidocaine and then advance 25-gauge 3-1/2 inch Quincke needles down to the thoracic trigger points as stated in the procedure title as well as her thoracic costotransverse ligaments.  Omnipaque contrast was injected showing proper spread into the costotransverse ligaments.  I then injected into each costotransverse ligament 2 cc of 2% lidocaine with 4 mg/cc of triamcinolone and then injected the trigger points of the thoracic longissimus, thoracic multifidus and thoracisis muscles.  Each area received 1 cc of 2% lidocaine with 4 mg/cc of triamcinolone.  There were greater than 3 trigger points injected.  She tolerated the procedure well there were no apparent complications.  The needle was then removed and a sterile bandage placed over the needle insertion sites.    Follow-up: Given her pain is situated just off to the right parasagittal location most likely this is either due to 1 level of discogenic thoracic back pain versus a symptomatic facet syndrome versus pain from the costotransverse ligaments.  I did inject the ligaments today so if she does not receive any benefit from today's treatment I would recommend we screen her facet joints in this area with medial  branches screening her T10-T11, T11-T12, and L1-L2 facet joints.  This would be done under local anesthesia and just on the right side given that her pain is just on the right side.

## 2020-11-12 NOTE — PROGRESS NOTES
"Hannah Krishna is a 50 year old female who is being evaluated via a billable telephone visit.      The patient has been notified of following:     \"This telephone visit will be conducted via a call between you and your physician/provider. We have found that certain health care needs can be provided without the need for a physical exam.  This service lets us provide the care you need with a short phone conversation.  If a prescription is necessary we can send it directly to your pharmacy.  If lab work is needed we can place an order for that and you can then stop by our lab to have the test done at a later time.    Telephone visits are billed at different rates depending on your insurance coverage. During this emergency period, for some insurers they may be billed the same as an in-person visit.  Please reach out to your insurance provider with any questions.    If during the course of the call the physician/provider feels a telephone visit is not appropriate, you will not be charged for this service.\"    Patient has given verbal consent for Telephone visit?  Yes    What phone number would you like to be contacted at? [phone]    How would you like to obtain your AVS? MyChart    Subjective     Hannah Krishna is a 50 year old female who presents via phone visit today for the following health issues:    HPI     Depression Followup    How are you doing with your depression since your last visit? Improved     Are you having other symptoms that might be associated with depression? No    Have you had a significant life event?  No     Are you feeling anxious or having panic attacks?   No    Do you have any concerns with your use of alcohol or other drugs? No     feels she is doing better on the Lexapro compared to the Zoloft.  Patient feels she is improved.  Getting up in the morning, not staying in bed.      Hasn't noticed the sweating as much since she stopped the sertraline.  Although the weather has changed " (colder) and not working full shifts which could contribute to improvement as well.      Still having back issues which affects her emotions.  States her blood pressure was elevated the day of her injections because of her pain and her nerves.    Social History     Tobacco Use     Smoking status: Former Smoker     Packs/day: 0.10     Years: 18.00     Pack years: 1.80     Types: Cigarettes     Quit date: 2014     Years since quittin.0     Smokeless tobacco: Never Used     Tobacco comment: since    Substance Use Topics     Alcohol use: Yes     Comment: weekends <2-4 drinks     Drug use: No     PHQ 3/30/2020 10/6/2020 2020   PHQ-9 Total Score 0 2 0   Q9: Thoughts of better off dead/self-harm past 2 weeks Not at all Not at all Not at all     CHITRA-7 SCORE 3/30/2020 10/6/2020 2020   Total Score - - -   Total Score - - -   Total Score 0 1 0       Review of Systems   CONSTITUTIONAL: NEGATIVE for fever, chills, change in weight  RESP: NEGATIVE for significant cough or shortness of breath  CV: NEGATIVE for chest pain, palpitations or peripheral edema       Objective          Vitals:  No vitals were obtained today due to virtual visit.    General: alert and no distress  PSYCH: Alert and oriented times 3; coherent speech, normal   rate and volume, able to articulate logical thoughts, able   to abstract reason, no tangential thoughts, no hallucinations   or delusions  Her affect is normal  RESP: No cough, no audible wheezing, able to talk in full sentences  Remainder of exam unable to be completed due to telephone visits          Assessment/Plan:    Assessment & Plan     Major depressive disorder, recurrent episode, moderate (H)  Improved.  We will continue Lexapro without change.  Follow-up in 6 months.    - escitalopram (LEXAPRO) 10 MG tablet; Take 1 tablet (10 mg) by mouth daily    Elevated blood pressure reading without diagnosis of hypertension  Patient will start monitoring her blood pressure on her  "own.  If it remains elevated she will make an appointment to discuss further management.       BMI:   Estimated body mass index is 39.33 kg/m  as calculated from the following:    Height as of 9/29/20: 1.607 m (5' 3.25\").    Weight as of 9/29/20: 101.5 kg (223 lb 12.8 oz).   Weight management plan: Discussed healthy diet and exercise guidelines             Return in about 6 months (around 5/13/2021) for depression follow up.    Although patient does states she has a voucher from work that if she comes in for physical she can get money into her account.  Therefore she may make a physical at her convenience.    Merlene Paz MD  [facility]    Phone call duration:  8 minutes              "

## 2020-11-13 ENCOUNTER — VIRTUAL VISIT (OUTPATIENT)
Dept: FAMILY MEDICINE | Facility: OTHER | Age: 50
End: 2020-11-13
Payer: COMMERCIAL

## 2020-11-13 DIAGNOSIS — R03.0 ELEVATED BLOOD PRESSURE READING WITHOUT DIAGNOSIS OF HYPERTENSION: ICD-10-CM

## 2020-11-13 DIAGNOSIS — F33.1 MAJOR DEPRESSIVE DISORDER, RECURRENT EPISODE, MODERATE (H): Primary | ICD-10-CM

## 2020-11-13 PROCEDURE — 99213 OFFICE O/P EST LOW 20 MIN: CPT | Mod: 95 | Performed by: FAMILY MEDICINE

## 2020-11-13 RX ORDER — ESCITALOPRAM OXALATE 10 MG/1
10 TABLET ORAL DAILY
Qty: 90 TABLET | Refills: 0 | Status: SHIPPED | OUTPATIENT
Start: 2020-11-13 | End: 2021-01-26

## 2020-11-13 ASSESSMENT — ANXIETY QUESTIONNAIRES
5. BEING SO RESTLESS THAT IT IS HARD TO SIT STILL: NOT AT ALL
IF YOU CHECKED OFF ANY PROBLEMS ON THIS QUESTIONNAIRE, HOW DIFFICULT HAVE THESE PROBLEMS MADE IT FOR YOU TO DO YOUR WORK, TAKE CARE OF THINGS AT HOME, OR GET ALONG WITH OTHER PEOPLE: NOT DIFFICULT AT ALL
7. FEELING AFRAID AS IF SOMETHING AWFUL MIGHT HAPPEN: NOT AT ALL
3. WORRYING TOO MUCH ABOUT DIFFERENT THINGS: NOT AT ALL
2. NOT BEING ABLE TO STOP OR CONTROL WORRYING: NOT AT ALL
1. FEELING NERVOUS, ANXIOUS, OR ON EDGE: NOT AT ALL
GAD7 TOTAL SCORE: 0
6. BECOMING EASILY ANNOYED OR IRRITABLE: NOT AT ALL

## 2020-11-13 ASSESSMENT — PATIENT HEALTH QUESTIONNAIRE - PHQ9
5. POOR APPETITE OR OVEREATING: NOT AT ALL
SUM OF ALL RESPONSES TO PHQ QUESTIONS 1-9: 0

## 2020-11-14 ASSESSMENT — ANXIETY QUESTIONNAIRES: GAD7 TOTAL SCORE: 0

## 2020-11-18 ENCOUNTER — VIRTUAL VISIT (OUTPATIENT)
Dept: NEUROSURGERY | Facility: CLINIC | Age: 50
End: 2020-11-18
Attending: NURSE PRACTITIONER
Payer: OTHER MISCELLANEOUS

## 2020-11-18 VITALS — HEIGHT: 64 IN | BODY MASS INDEX: 36.7 KG/M2 | WEIGHT: 215 LBS

## 2020-11-18 DIAGNOSIS — M54.6 ACUTE RIGHT-SIDED THORACIC BACK PAIN: Primary | ICD-10-CM

## 2020-11-18 PROCEDURE — 99213 OFFICE O/P EST LOW 20 MIN: CPT | Mod: 95 | Performed by: NURSE PRACTITIONER

## 2020-11-18 ASSESSMENT — PAIN SCALES - GENERAL: PAINLEVEL: MODERATE PAIN (5)

## 2020-11-18 ASSESSMENT — MIFFLIN-ST. JEOR: SCORE: 1585.23

## 2020-11-18 NOTE — LETTER
"    11/18/2020         RE: Hannah Krishna  [address] [facility] [address]        Dear Colleague,    Thank you for referring your patient, Hannah Krishna, to the [facility] NEUROSUR[facility]ERY [facility]INIC [place]. Please see a copy of my visit note below.    Hannah Krishna is a 50 year old female who is being evaluated via a billable video visit.      The patient has been notified of following:     \"This video visit will be conducted via a call between you and your physician/provider. We have found that certain health care needs can be provided without the need for an in-person physical exam.  This service lets us provide the care you need with a video conversation.  If a prescription is necessary we can send it directly to your pharmacy.  If lab work is needed we can place an order for that and you can then stop by our lab to have the test done at a later time.    Video visits are billed at different rates depending on your insurance coverage.  Please reach out to your insurance provider with any questions.    If during the course of the call the physician/provider feels a video visit is not appropriate, you will not be charged for this service.\"    Patient has given verbal consent for Video visit? Yes  How would you like to obtain your AVS? MyChart  If you are dropped from the video visit, the video invite should be resent to: Text to cell phone: [phone]  Will anyone else be joining your video visit? No      Lynnette Membreno NP on 11/18/2020 at 1:49 PM      HPI: Hannah Krishna is a 50 year old female presents for a follow up in regards to her right-sided mid back pain. She underwent a TESI with trigger point injections with 3 days of good relief. She states she continues to have the same pain as prior. She has undergone chiropractic therapy without much sustainable benefit. Her chiropractor is managing her work restrictions. She states the pain is aggravated by touch and ROM in the T10-L2 " region.    Medical, surgical, family, and social history unchanged since prior exam.    ROS: 10 point ROS neg other than the symptoms noted above in the HPI.    Assessment/Plan:  Thoracic back pain    Recommend thoracic/lumbar MBB/RFA workup. Ok with PT. She will contact us if symptoms persist or worsen. She verbalized understanding and agreement.    Patient Instructions   -Thoracic medial branch block workup for radiofrequency ablation ordered. They will contact you to schedule.  -Ok for physical therapy. Please contact our clinic if you need a referral.  -Please contact our clinic with questions or concerns at [phone].      Phone call start: 1424  Phone call end: 1439  Phone call duration: 15 minutes    Lynnette Membreno CNP  [facility] Neurosurgery  [address] [place] [id number]  Tel [phone]  Fax [phone]            Again, thank you for allowing me to participate in the care of your patient.        Sincerely,        Lynnette Membreno NP

## 2020-11-18 NOTE — PROGRESS NOTES
"Hannah Krishna is a 50 year old female who is being evaluated via a billable video visit.      The patient has been notified of following:     \"This video visit will be conducted via a call between you and your physician/provider. We have found that certain health care needs can be provided without the need for an in-person physical exam.  This service lets us provide the care you need with a video conversation.  If a prescription is necessary we can send it directly to your pharmacy.  If lab work is needed we can place an order for that and you can then stop by our lab to have the test done at a later time.    Video visits are billed at different rates depending on your insurance coverage.  Please reach out to your insurance provider with any questions.    If during the course of the call the physician/provider feels a video visit is not appropriate, you will not be charged for this service.\"    Patient has given verbal consent for Video visit? Yes  How would you like to obtain your AVS? MyChart  If you are dropped from the video visit, the video invite should be resent to: Text to cell phone: [phone]  Will anyone else be joining your video visit? No      Lynnette Membreno NP on 11/18/2020 at 1:49 PM      HPI: Hannah Krishna is a 50 year old female presents for a follow up in regards to her right-sided mid back pain. She underwent a TESI with trigger point injections with 3 days of good relief. She states she continues to have the same pain as prior. She has undergone chiropractic therapy without much sustainable benefit. Her chiropractor is managing her work restrictions. She states the pain is aggravated by touch and ROM in the T10-L2 region.    Medical, surgical, family, and social history unchanged since prior exam.    ROS: 10 point ROS neg other than the symptoms noted above in the HPI.    Assessment/Plan:  Thoracic back pain    Recommend thoracic/lumbar MBB/RFA workup. Ok with PT. She will contact " us if symptoms persist or worsen. She verbalized understanding and agreement.    Patient Instructions   -Thoracic medial branch block workup for radiofrequency ablation ordered. They will contact you to schedule.  -Ok for physical therapy. Please contact our clinic if you need a referral.  -Please contact our clinic with questions or concerns at [phone].      Phone call start: 1424  Phone call end: 1439  Phone call duration: 15 minutes    Lynnette Membreno [address] [address]  Tel [phone]  Fax [phone]

## 2020-11-18 NOTE — PATIENT INSTRUCTIONS
-Thoracic medial branch block workup for radiofrequency ablation ordered. They will contact you to schedule.  -Ok for physical therapy. Please contact our clinic if you need a referral.  -Please contact our clinic with questions or concerns at [phone].

## 2020-11-20 ENCOUNTER — TELEPHONE (OUTPATIENT)
Dept: SURGERY | Facility: CLINIC | Age: 50
End: 2020-11-20

## 2020-11-20 DIAGNOSIS — Z11.59 ENCOUNTER FOR SCREENING FOR OTHER VIRAL DISEASES: Primary | ICD-10-CM

## 2020-11-20 NOTE — TELEPHONE ENCOUNTER
Pt scheduled for MBB  Date:12/4/2020  Time:1030am  Dr. Gibbons    Instructed pt to have a   for procedure.  Patient informed of COVID testing process.

## 2020-11-23 NOTE — PROGRESS NOTES
SUBJECTIVE:   CC: Hannah Krishna is an 50 year old woman who presents for preventive health visit.       Patient has been advised of split billing requirements and indicates understanding: Yes  Healthy Habits:     Getting at least 3 servings of Calcium per day:  Yes    Bi-annual eye exam:  NO    Dental care twice a year:  Yes    Sleep apnea or symptoms of sleep apnea:  None    Diet:  Regular (no restrictions)    Frequency of exercise:  1 day/week    Duration of exercise:  Less than 15 minutes    Taking medications regularly:  Yes    Medication side effects:  None    PHQ-2 Total Score: 0    Additional concerns today:  Yes      Today's PHQ-2 Score:   PHQ-2 (  [employer]) 2020   Q1: Little interest or pleasure in doing things 0   Q2: Feeling down, depressed or hopeless 0   PHQ-2 Score 0   Q1: Little interest or pleasure in doing things Not at all   Q2: Feeling down, depressed or hopeless Not at all   PHQ-2 Score 0       Social History     Tobacco Use     Smoking status: Former Smoker     Packs/day: 0.10     Years: 18.00     Pack years: 1.80     Types: Cigarettes     Quit date: 2014     Years since quittin.0     Smokeless tobacco: Never Used     Tobacco comment: since    Substance Use Topics     Alcohol use: Yes     Comment: weekends <2-4 drinks         Alcohol Use 2020   Prescreen: >3 drinks/day or >7 drinks/week? No   Prescreen: >3 drinks/day or >7 drinks/week? -       Reviewed orders with patient.  Reviewed health maintenance and updated orders accordingly - Yes    Mammogram Screening: Patient under age 50, mutual decision reflected in health maintenance.      Pertinent mammograms are reviewed under the imaging tab.  History of abnormal Pap smear: NO - age 30-65 PAP every 5 years with negative HPV co-testing recommended  PAP / HPV Latest Ref Rng & Units 2016 9/3/2013 2009   PAP - NIL NIL NIL   HPV 16 DNA NEG Negative - -   HPV 18 DNA NEG Negative - -   OTHER HR HPV NEG Negative  "- -     Reviewed and updated as needed this visit by clinical staff  Tobacco  Allergies  Meds  Problems  Med Hx  Surg Hx  Fam Hx  Soc Hx          Reviewed and updated as needed this visit by Provider  Tobacco  Allergies  Meds  Problems  Med Hx  Surg Hx  Fam Hx             Review of Systems   Constitutional: Negative for chills and fever.   HENT: Negative for congestion, ear pain, hearing loss and sore throat.    Eyes: Negative for pain and visual disturbance.   Respiratory: Negative for cough and shortness of breath.    Cardiovascular: Negative for chest pain, palpitations and peripheral edema.   Gastrointestinal: Negative for abdominal pain, constipation, diarrhea, heartburn, hematochezia and nausea.   Breasts:  Negative for tenderness, breast mass and discharge.   Genitourinary: Positive for vaginal discharge (itches). Negative for dysuria, frequency, genital sores, hematuria, pelvic pain, urgency and vaginal bleeding.   Musculoskeletal: Negative for arthralgias, joint swelling and myalgias.   Skin: Negative for rash.   Neurological: Negative for dizziness, weakness, headaches and paresthesias.   Psychiatric/Behavioral: Negative for mood changes. The patient is not nervous/anxious.         OBJECTIVE:   /76 (BP Location: Left arm, Patient Position: Chair, Cuff Size: Adult Regular)   Pulse 88   Temp 98.4  F (36.9  C) (Temporal)   Resp 20   Ht 1.626 m (5' 4\")   Wt 102.5 kg (226 lb)   SpO2 98%   Breastfeeding No   BMI 38.79 kg/m    Physical Exam  Constitutional:       General: She is not in acute distress.     Appearance: She is well-developed.   HENT:      Right Ear: Tympanic membrane and external ear normal.      Left Ear: Tympanic membrane and external ear normal.      Nose: Nose normal.      Mouth/Throat:      Pharynx: No oropharyngeal exudate.   Eyes:      General:         Right eye: No discharge.         Left eye: No discharge.      Conjunctiva/sclera: Conjunctivae normal.      " "Pupils: Pupils are equal, round, and reactive to light.   Neck:      Musculoskeletal: Neck supple.      Thyroid: No thyromegaly.      Trachea: No tracheal deviation.   Cardiovascular:      Rate and Rhythm: Normal rate and regular rhythm.      Pulses: Normal pulses.      Heart sounds: Normal heart sounds, S1 normal and S2 normal. No murmur. No friction rub. No S3 or S4 sounds.    Pulmonary:      Effort: Pulmonary effort is normal. No respiratory distress.      Breath sounds: Normal breath sounds. No wheezing or rales.   Abdominal:      General: Bowel sounds are normal.      Palpations: Abdomen is soft. There is no mass.      Tenderness: There is no abdominal tenderness.   Musculoskeletal: Normal range of motion.   Lymphadenopathy:      Cervical: No cervical adenopathy.   Skin:     General: Skin is warm and dry.      Findings: No rash.   Neurological:      Mental Status: She is alert and oriented to person, place, and time.      Motor: No abnormal muscle tone.      Deep Tendon Reflexes: Reflexes are normal and symmetric.   Psychiatric:         Thought Content: Thought content normal.         Judgment: Judgment normal.           ASSESSMENT/PLAN:   1. Routine general medical examination at a health care facility      2. Pain in thoracic spine  Will be having epidural injection.  Based on today's visit patient is approved for procedure.      Patient has been advised of split billing requirements and indicates understanding: No  COUNSELING:  Reviewed preventive health counseling, as reflected in patient instructions    Estimated body mass index is 38.79 kg/m  as calculated from the following:    Height as of this encounter: 1.626 m (5' 4\").    Weight as of this encounter: 102.5 kg (226 lb).    Weight management plan: Discussed healthy diet and exercise guidelines    She reports that she quit smoking about 6 years ago. Her smoking use included cigarettes. She has a 1.80 pack-year smoking history. She has never used " smokeless tobacco.      Counseling Resources:  ATP IV Guidelines  Pooled Cohorts Equation Calculator  Breast Cancer Risk Calculator  BRCA-Related Cancer Risk Assessment: FHS-7 Tool  FRAX Risk Assessment  ICSI Preventive Guidelines  Dietary Guidelines for Americans, 2010  USDA's MyPlate  ASA Prophylaxis  Lung CA Screening    Merlene Paz MD  [facility]

## 2020-11-27 ENCOUNTER — OFFICE VISIT (OUTPATIENT)
Dept: FAMILY MEDICINE | Facility: OTHER | Age: 50
End: 2020-11-27
Payer: COMMERCIAL

## 2020-11-27 VITALS
DIASTOLIC BLOOD PRESSURE: 76 MMHG | TEMPERATURE: 98.4 F | BODY MASS INDEX: 38.58 KG/M2 | HEIGHT: 64 IN | RESPIRATION RATE: 20 BRPM | OXYGEN SATURATION: 98 % | WEIGHT: 226 LBS | HEART RATE: 88 BPM | SYSTOLIC BLOOD PRESSURE: 134 MMHG

## 2020-11-27 DIAGNOSIS — M54.6 PAIN IN THORACIC SPINE: ICD-10-CM

## 2020-11-27 DIAGNOSIS — Z00.00 ROUTINE GENERAL MEDICAL EXAMINATION AT A HEALTH CARE FACILITY: Primary | ICD-10-CM

## 2020-11-27 PROCEDURE — 99396 PREV VISIT EST AGE 40-64: CPT | Performed by: FAMILY MEDICINE

## 2020-11-27 ASSESSMENT — ENCOUNTER SYMPTOMS
CONSTIPATION: 0
ABDOMINAL PAIN: 0
JOINT SWELLING: 0
PARESTHESIAS: 0
NERVOUS/ANXIOUS: 0
HEMATOCHEZIA: 0
EYE PAIN: 0
SHORTNESS OF BREATH: 0
HEARTBURN: 0
DYSURIA: 0
ARTHRALGIAS: 0
WEAKNESS: 0
CHILLS: 0
HEMATURIA: 0
FEVER: 0
DIARRHEA: 0
COUGH: 0
PALPITATIONS: 0
HEADACHES: 0
FREQUENCY: 0
BREAST MASS: 0
MYALGIAS: 0
DIZZINESS: 0
SORE THROAT: 0
NAUSEA: 0

## 2020-11-27 ASSESSMENT — MIFFLIN-ST. JEOR: SCORE: 1635.13

## 2020-11-30 DIAGNOSIS — Z11.59 ENCOUNTER FOR SCREENING FOR OTHER VIRAL DISEASES: ICD-10-CM

## 2020-11-30 PROCEDURE — U0003 INFECTIOUS AGENT DETECTION BY NUCLEIC ACID (DNA OR RNA); SEVERE ACUTE RESPIRATORY SYNDROME CORONAVIRUS 2 (SARS-COV-2) (CORONAVIRUS DISEASE [COVID-19]), AMPLIFIED PROBE TECHNIQUE, MAKING USE OF HIGH THROUGHPUT TECHNOLOGIES AS DESCRIBED BY CMS-2020-01-R: HCPCS | Performed by: ANESTHESIOLOGY

## 2020-12-01 LAB
SARS-COV-2 RNA SPEC QL NAA+PROBE: NOT DETECTED
SPECIMEN SOURCE: NORMAL

## 2020-12-04 ENCOUNTER — HOSPITAL ENCOUNTER (OUTPATIENT)
Dept: GENERAL RADIOLOGY | Facility: CLINIC | Age: 50
End: 2020-12-04
Attending: ANESTHESIOLOGY | Admitting: ANESTHESIOLOGY
Payer: OTHER MISCELLANEOUS

## 2020-12-04 ENCOUNTER — HOSPITAL ENCOUNTER (OUTPATIENT)
Facility: CLINIC | Age: 50
Discharge: HOME | End: 2020-12-04
Attending: ANESTHESIOLOGY | Admitting: ANESTHESIOLOGY
Payer: OTHER MISCELLANEOUS

## 2020-12-04 VITALS
RESPIRATION RATE: 16 BRPM | SYSTOLIC BLOOD PRESSURE: 133 MMHG | OXYGEN SATURATION: 97 % | TEMPERATURE: 98 F | DIASTOLIC BLOOD PRESSURE: 79 MMHG

## 2020-12-04 DIAGNOSIS — M54.6 ACUTE RIGHT-SIDED THORACIC BACK PAIN: ICD-10-CM

## 2020-12-04 PROCEDURE — 77003 FLUOROGUIDE FOR SPINE INJECT: CPT | Mod: TC

## 2020-12-04 PROCEDURE — 64491 INJ PARAVERT F JNT C/T 2 LEV: CPT | Mod: RT | Performed by: ANESTHESIOLOGY

## 2020-12-04 PROCEDURE — 64493 INJ PARAVERT F JNT L/S 1 LEV: CPT | Performed by: ANESTHESIOLOGY

## 2020-12-04 PROCEDURE — 250N000011 HC RX IP 250 OP 636: Performed by: ANESTHESIOLOGY

## 2020-12-04 PROCEDURE — 64490 INJ PARAVERT F JNT C/T 1 LEV: CPT | Mod: RT | Performed by: ANESTHESIOLOGY

## 2020-12-04 PROCEDURE — 64495 INJ PARAVERT F JNT L/S 3 LEV: CPT | Performed by: ANESTHESIOLOGY

## 2020-12-04 PROCEDURE — 64492 INJ PARAVERT F JNT C/T 3 LEV: CPT | Mod: RT | Performed by: ANESTHESIOLOGY

## 2020-12-04 PROCEDURE — 64494 INJ PARAVERT F JNT L/S 2 LEV: CPT | Performed by: ANESTHESIOLOGY

## 2020-12-04 RX ORDER — LIDOCAINE 40 MG/G
CREAM TOPICAL
Status: CANCELLED | OUTPATIENT
Start: 2020-12-04

## 2020-12-04 RX ORDER — BUPIVACAINE HYDROCHLORIDE 7.5 MG/ML
INJECTION, SOLUTION EPIDURAL; RETROBULBAR PRN
Status: DISCONTINUED | OUTPATIENT
Start: 2020-12-04 | End: 2020-12-04 | Stop reason: HOSPADM

## 2020-12-04 RX ORDER — IOPAMIDOL 612 MG/ML
INJECTION, SOLUTION INTRATHECAL PRN
Status: DISCONTINUED | OUTPATIENT
Start: 2020-12-04 | End: 2020-12-04 | Stop reason: HOSPADM

## 2020-12-04 NOTE — OP NOTE
PRE-OP DIAGNOSIS: Thoracolumbar spondylosis causing right sided upper lumbar low thoracic back pain    POST-OP DIAGNOSIS:  same    PROCEDURE: Right T10, T11, T12, L1 medial branch nerve blocks using fluoroscopic guidance and contrast control.     ANESTHESIA:  Local    PROCEDURE DETAILS: After discussing the risks, benefits, and alternatives to the procedure, the patient expressed understanding and wished to proceed. Written consent was obtained.  The patient was escorted to the fluoroscopy suite and placed in prone position on the procedure table.  A procedural pause was conducted to verify the correct: patient identity, procedure to be performed, side, site, patient position, and any special requirements.  I had preoperatively identified the levels of pain and then correlated this to her MRI and fluoroscopic images.  There was notable spondylosis at the T11 and T12 segments.  Her pain correlates exactly to the T12-L1 locations with a little bit of overlap up to the T11 and possibly T10 levels.  The skin was aseptically prepped and draped in the usual fashion. The skin and subcutaneous tissue were anesthetized with 1% lidocaine.  I then advanced a 25-gauge Quincke needles into her Watson's points at the right T11 and T12 locations which corresponds to the right T10 and T11 medial branches.  Separate 25-gauge Quincke needles were also placed into her Watson's points at the right L1 and L2 levels which corresponds to the right T12 and L1 medial branches respectively.  The patient did not receive sedation during the procedure. The patient was monitored with blood pressure and pulse oximetry machines with the assistance of an RN throughout the procedure.  The patient was alert and responsive to questions throughout the procedure.   The patient tolerated the procedure well and was observed in the post-procedural area.  The patient was dismissed without apparent complications.     Blood loss: < 5 cc    PLAN:  1. Performed  medial branch blocks of the right T10, T11, T12 and L1 medial branches which effectively anesthetize her right T11-T12, T12-L1, L1-L2 facet joints.  If there is relief from the medial branch blocks, will repeat with Lidocaine 4% for two comparative diagnostic blocks. If benefit with appropriate time duration of relief for both blocks, will recommend proceeding with longer term relief utilizing radiofrequency neurolysis.  Her today's injection to be diagnostically positive the patient will need to receive at least 2 hours of pain relief in the % range.  If they receive a smaller percentage of pain relief from the injection or the pain relief does not last for at least 2 hours this will be considered a negative diagnostic block and other pain generators should be considered.    2. The patient was instructed to fax a pain relief sheet back to the [facility] same-day surgery department so I can take a look at the pain relief form and determine what my diagnostic impression would be.    González Gibbons MD  Diplomate of the American Board of Anesthesiology, Pain Medicine

## 2020-12-04 NOTE — DISCHARGE INSTRUCTIONS
Home Care Instructions     Please fax or mail this form to the location that is selected at the bottom of your pain relief form.  Do not try to send the form back to Dr. Gibbons's clinic in [place].  If you are unsure of which location to send this form back to, please send it back to the [facility] same-day surgery department.  Dr. Gibbons will then evaluate your form and determine the next step of your care.            Procedure:  Diagnostic Block (which includes medial branch blocks, SI joint injection blocks, and nerve root injections)    Activity:  The injection was used to identify the cause of your pain:    Resume normal activity  You are to engage in activity that would have normally caused you discomfort to determine the effectiveness of the block/injection.  It is imperative to evaluate and rate your pain the first 2 hours after the injection.     Pain:    You may experience soreness at the injection site for one or two days    You may use an ice pack for 20 minutes every 2 hours for the first 24 hours    You may use a heating pad after the first 24 hours    You may use Tylenol  (acetaminophen) every 4 hours or other pain medicines as directed by your physician after your block wears off.    Safety  You may experience numbness radiating into your legs or arms, (depending on the procedure location)  This numbness may last several hours.  Until the numb sensation returns to normal please use caution in walking, climbing stairs, stepping out of your vehicle, etc.    Please contact us if you have:  Severe pain   Fever more than 101.5 degrees Fahrenheit  Signs of infection (redness, swelling or drainage)       If you need immediate attention we recommend that you go to a hospital emergency room or dial 911.    _____ Please contact our office one week after your injection to report your percent of pain relief.   See attached One Week Procedure Injection Report  __X__ Please return your Nerve Block Pain  Relief Form the day after your injection.     See attached Nerve Block Pain Relief Form             ## PLEASE SEND NERVE BLOCK PAIN RELIEF REPORT WITH PATIENT ##

## 2020-12-08 DIAGNOSIS — M54.6 ACUTE RIGHT-SIDED THORACIC BACK PAIN: ICD-10-CM

## 2020-12-08 RX ORDER — CYCLOBENZAPRINE HCL 5 MG
TABLET ORAL
Qty: 30 TABLET | Refills: 0 | Status: SHIPPED | OUTPATIENT
Start: 2020-12-08 | End: 2021-02-26

## 2020-12-10 DIAGNOSIS — M47.814 SPONDYLOSIS OF THORACIC REGION WITHOUT MYELOPATHY OR RADICULOPATHY: Primary | ICD-10-CM

## 2020-12-11 ENCOUNTER — TELEPHONE (OUTPATIENT)
Dept: NEUROSURGERY | Facility: CLINIC | Age: 50
End: 2020-12-11

## 2020-12-11 NOTE — TELEPHONE ENCOUNTER
Pt called wondering what next steps will be with regards to POC.  Please call pt as soon as possible.   Her  is Buffy at [phone]- this is WC.

## 2020-12-14 ENCOUNTER — TELEPHONE (OUTPATIENT)
Facility: CLINIC | Age: 50
End: 2020-12-14

## 2020-12-14 DIAGNOSIS — Z11.59 ENCOUNTER FOR SCREENING FOR OTHER VIRAL DISEASES: Primary | ICD-10-CM

## 2020-12-26 DIAGNOSIS — Z11.59 ENCOUNTER FOR SCREENING FOR OTHER VIRAL DISEASES: ICD-10-CM

## 2020-12-26 PROCEDURE — U0003 INFECTIOUS AGENT DETECTION BY NUCLEIC ACID (DNA OR RNA); SEVERE ACUTE RESPIRATORY SYNDROME CORONAVIRUS 2 (SARS-COV-2) (CORONAVIRUS DISEASE [COVID-19]), AMPLIFIED PROBE TECHNIQUE, MAKING USE OF HIGH THROUGHPUT TECHNOLOGIES AS DESCRIBED BY CMS-2020-01-R: HCPCS | Performed by: ANESTHESIOLOGY

## 2020-12-27 LAB
LABORATORY COMMENT REPORT: NORMAL
SARS-COV-2 RNA SPEC QL NAA+PROBE: NEGATIVE
SARS-COV-2 RNA SPEC QL NAA+PROBE: NORMAL
SPECIMEN SOURCE: NORMAL
SPECIMEN SOURCE: NORMAL

## 2020-12-29 ENCOUNTER — HOSPITAL ENCOUNTER (OUTPATIENT)
Dept: GENERAL RADIOLOGY | Facility: CLINIC | Age: 50
End: 2020-12-29
Attending: ANESTHESIOLOGY | Admitting: ANESTHESIOLOGY
Payer: OTHER MISCELLANEOUS

## 2020-12-29 ENCOUNTER — HOSPITAL ENCOUNTER (OUTPATIENT)
Facility: CLINIC | Age: 50
Discharge: HOME | End: 2020-12-29
Attending: ANESTHESIOLOGY | Admitting: ANESTHESIOLOGY
Payer: OTHER MISCELLANEOUS

## 2020-12-29 VITALS
OXYGEN SATURATION: 95 % | TEMPERATURE: 99.1 F | DIASTOLIC BLOOD PRESSURE: 109 MMHG | RESPIRATION RATE: 18 BRPM | SYSTOLIC BLOOD PRESSURE: 173 MMHG

## 2020-12-29 DIAGNOSIS — M47.814 SPONDYLOSIS, THORACIC, WITHOUT MYELOPATHY: ICD-10-CM

## 2020-12-29 PROCEDURE — 64492 INJ PARAVERT F JNT C/T 3 LEV: CPT | Mod: RT | Performed by: ANESTHESIOLOGY

## 2020-12-29 PROCEDURE — 250N000009 HC RX 250: Performed by: ANESTHESIOLOGY

## 2020-12-29 PROCEDURE — 64492 INJ PARAVERT F JNT C/T 3 LEV: CPT | Performed by: ANESTHESIOLOGY

## 2020-12-29 PROCEDURE — 250N000011 HC RX IP 250 OP 636: Performed by: ANESTHESIOLOGY

## 2020-12-29 PROCEDURE — 64490 INJ PARAVERT F JNT C/T 1 LEV: CPT | Mod: RT | Performed by: ANESTHESIOLOGY

## 2020-12-29 PROCEDURE — 999N000179 XR SURGERY CARM FLUORO LESS THAN 5 MIN W STILLS: Mod: TC

## 2020-12-29 PROCEDURE — 64491 INJ PARAVERT F JNT C/T 2 LEV: CPT | Mod: RT | Performed by: ANESTHESIOLOGY

## 2020-12-29 PROCEDURE — 64493 INJ PARAVERT F JNT L/S 1 LEV: CPT | Performed by: ANESTHESIOLOGY

## 2020-12-29 PROCEDURE — 64491 INJ PARAVERT F JNT C/T 2 LEV: CPT | Performed by: ANESTHESIOLOGY

## 2020-12-29 RX ORDER — LIDOCAINE HYDROCHLORIDE 40 MG/ML
INJECTION, SOLUTION RETROBULBAR PRN
Status: DISCONTINUED | OUTPATIENT
Start: 2020-12-29 | End: 2020-12-29 | Stop reason: HOSPADM

## 2020-12-29 RX ORDER — LIDOCAINE 40 MG/G
CREAM TOPICAL
Status: DISCONTINUED | OUTPATIENT
Start: 2020-12-29 | End: 2020-12-29 | Stop reason: HOSPADM

## 2020-12-29 RX ORDER — IOPAMIDOL 612 MG/ML
INJECTION, SOLUTION INTRATHECAL PRN
Status: DISCONTINUED | OUTPATIENT
Start: 2020-12-29 | End: 2020-12-29 | Stop reason: HOSPADM

## 2020-12-29 NOTE — DISCHARGE INSTRUCTIONS
Home Care Instructions     Please fax or mail this form to the location that is selected at the bottom of your pain relief form.  Do not try to send the form back to Dr. Gibbons's clinic in [place].  If you are unsure of which location to send this form back to, please send it back to the [facility] same-day surgery department.  Dr. Gibbons will then evaluate your form and determine the next step of your care.            Procedure:  Diagnostic Block (which includes medial branch blocks, SI joint injection blocks, and nerve root injections)    Activity:  The injection was used to identify the cause of your pain:    Resume normal activity  You are to engage in activity that would have normally caused you discomfort to determine the effectiveness of the block/injection.  It is imperative to evaluate and rate your pain the first 2 hours after the injection.     Pain:    You may experience soreness at the injection site for one or two days    You may use an ice pack for 20 minutes every 2 hours for the first 24 hours    You may use a heating pad after the first 24 hours    You may use Tylenol  (acetaminophen) every 4 hours or other pain medicines as directed by your physician after your block wears off.    Safety  You may experience numbness radiating into your legs or arms, (depending on the procedure location)  This numbness may last several hours.  Until the numb sensation returns to normal please use caution in walking, climbing stairs, stepping out of your vehicle, etc.    Please contact us if you have:  Severe pain   Fever more than 101.5 degrees Fahrenheit  Signs of infection (redness, swelling or drainage)       If you need immediate attention we recommend that you go to a hospital emergency room or dial 911.    _____ Please contact our office one week after your injection to report your percent of pain relief.   See attached One Week Procedure Injection Report  __X__ Please return your Nerve Block Pain  Relief Form the day after your injection.     See attached Nerve Block Pain Relief Form             ## PLEASE SEND NERVE BLOCK PAIN RELIEF REPORT WITH PATIENT ##

## 2020-12-29 NOTE — OP NOTE
PROCEDURE: Right T10, T11, T12, L1 medial branch nerve blocks using fluoroscopic guidance and contrast control.      ANESTHESIA:  Local     PROCEDURE DETAILS: After discussing the risks, benefits, and alternatives to the procedure, the patient expressed understanding and wished to proceed. Written consent was obtained.  The patient was escorted to the fluoroscopy suite and placed in prone position on the procedure table.  A procedural pause was conducted to verify the correct: patient identity, procedure to be performed, side, site, patient position, and any special requirements.  I had preoperatively identified the levels of pain and then correlated this to her MRI and fluoroscopic images.  There was notable spondylosis at the T11 and T12 segments.  Her pain correlates exactly to the T12-L1 locations with a little bit of overlap up to the T11 and possibly T10 levels.  The skin was aseptically prepped and draped in the usual fashion. The skin and subcutaneous tissue were anesthetized with 1% lidocaine.  I then advanced a 25-gauge Quincke needles into her Watson's points at the right T11 and T12 locations which corresponds to the right T10 and T11 medial branches.  Separate 25-gauge Quincke needles were also placed into her Watson's points at the right L1 and L2 levels which corresponds to the right T12 and L1 medial branches respectively.  I then injected 0.5 cc of 4% lidocaine into each location.  There is good dispersion of the injected contrast showing good dispersion of medication over each medial branch nerve.       Blood loss: < 5 cc     PLAN:  1. Performed medial branch blocks of the right T10, T11, T12 and L1 medial branches which effectively anesthetize her right T11-T12, T12-L1, L1-L2 facet joints.  She needs at least 1 hour of pain relief in the 80 to 100% range in order for this to be called diagnostically positive.  If it is then I would recommend following through with a radiofrequency ablation of  these facet joints.  If she does undergo radiofrequency ablation and this is helpful for 6 to 12 months but then the pain returns and I would recommend a nerve transection for more likely a permanent response.     González Gibbons MD  Diplomate of the American Board of Anesthesiology, Pain Medicine

## 2021-01-05 ENCOUNTER — TELEPHONE (OUTPATIENT)
Dept: NEUROSURGERY | Facility: CLINIC | Age: 51
End: 2021-01-05

## 2021-01-05 NOTE — TELEPHONE ENCOUNTER
Reason For Call: Patient is requesting a call back for her next steps patient is still in pain s/p injection, she would like a call back at [phone].

## 2021-01-06 ENCOUNTER — TELEPHONE (OUTPATIENT)
Dept: NEUROSURGERY | Facility: CLINIC | Age: 51
End: 2021-01-06

## 2021-01-06 NOTE — TELEPHONE ENCOUNTER
Attempted to reach out to Stacey, no answer. Left voice message for her  to call clinic back to further discuss.

## 2021-01-06 NOTE — TELEPHONE ENCOUNTER
Pt's [facility]- Stacey Jorge looking for next steps with this patient. Still waiting for results of scan Please call Stacey at [phone] with  POC.

## 2021-01-06 NOTE — TELEPHONE ENCOUNTER
"Spoke with pt's [facility] Stacey Jorge. Pt had 2nd MMB with Dr Gibbons on 12/29/20. Looking for next step in pain control like if she needs radiofrequency or not. Pt send pain log to Dr Gibbons for eval. Per Dr Gibbons on 12/29/ 20 plan : She needs at least 1 hour of pain relief in the 80 to 100% range in order for this to be called diagnostically positive.  If it is then I would recommend following through with a radiofrequency ablation of these facet joints.  If she does undergo radiofrequency ablation and this is helpful for 6 to 12 months but then the pain returns and I would recommend a nerve transection for more likely a permanent response.\"  "

## 2021-01-06 NOTE — TELEPHONE ENCOUNTER
Pain log received regarding MBB performed on 12/29/2020. She had 100% relief of symptoms for 5 hours. Ok to proceed.

## 2021-01-07 DIAGNOSIS — M54.6 ACUTE RIGHT-SIDED THORACIC BACK PAIN: Primary | ICD-10-CM

## 2021-01-08 NOTE — TELEPHONE ENCOUNTER
Spoke with patient's  Meghan Jorge to provide update that patient received 100% relief from last MBB and next step is RFA which is what our provided ordered. Copy of order was faxed to her per her request to [phone]. She had no further questions at this time.

## 2021-01-09 ENCOUNTER — HEALTH MAINTENANCE LETTER (OUTPATIENT)
Age: 51
End: 2021-01-09

## 2021-01-11 ENCOUNTER — TELEPHONE (OUTPATIENT)
Dept: SURGERY | Facility: CLINIC | Age: 51
End: 2021-01-11

## 2021-01-11 DIAGNOSIS — Z11.59 ENCOUNTER FOR SCREENING FOR OTHER VIRAL DISEASES: Primary | ICD-10-CM

## 2021-01-11 NOTE — TELEPHONE ENCOUNTER
Pt scheduled for RFA  Date: 1/28/21  Time: 0800  Dr. Gibbons    Instructed pt to have H&P and  for procedure.  Patient informed of COVID testing process.

## 2021-01-22 ENCOUNTER — RECORDS - HEALTHEAST (OUTPATIENT)
Dept: LAB | Facility: CLINIC | Age: 51
End: 2021-01-22

## 2021-01-22 LAB
SARS-COV-2 PCR COMMENT: NORMAL
SARS-COV-2 RNA SPEC QL NAA+PROBE: NORMAL
SARS-COV-2 VIRUS SPECIMEN SOURCE: NORMAL

## 2021-01-24 ENCOUNTER — OFFICE VISIT (OUTPATIENT)
Dept: LAB | Facility: CLINIC | Age: 51
End: 2021-01-24
Attending: ANESTHESIOLOGY
Payer: COMMERCIAL

## 2021-01-24 DIAGNOSIS — Z11.59 ENCOUNTER FOR SCREENING FOR OTHER VIRAL DISEASES: ICD-10-CM

## 2021-01-24 LAB
SARS-COV-2 RNA RESP QL NAA+PROBE: NORMAL
SPECIMEN SOURCE: NORMAL

## 2021-01-24 PROCEDURE — U0003 INFECTIOUS AGENT DETECTION BY NUCLEIC ACID (DNA OR RNA); SEVERE ACUTE RESPIRATORY SYNDROME CORONAVIRUS 2 (SARS-COV-2) (CORONAVIRUS DISEASE [COVID-19]), AMPLIFIED PROBE TECHNIQUE, MAKING USE OF HIGH THROUGHPUT TECHNOLOGIES AS DESCRIBED BY CMS-2020-01-R: HCPCS | Performed by: ANESTHESIOLOGY

## 2021-01-24 PROCEDURE — U0005 INFEC AGEN DETEC AMPLI PROBE: HCPCS | Performed by: ANESTHESIOLOGY

## 2021-01-25 LAB
LABORATORY COMMENT REPORT: NORMAL
SARS-COV-2 RNA RESP QL NAA+PROBE: NEGATIVE
SPECIMEN SOURCE: NORMAL

## 2021-01-25 NOTE — PROGRESS NOTES
[address]  [facility] [address]  Phone: [phone]  Primary Provider: Merlene Le  Pre-op Performing Provider: MERLENE LE    PREOPERATIVE EVALUATION:  Today's date: 1/26/2021    Hannah Krishna is a 50 year old female who presents for a preoperative evaluation.    Surgical Information:  Surgery/Procedure: radiofrequency ablation tthoracic 10, 11, 12 and lumabr 1  Surgery Location: [place]  Surgeon: Edwige  Surgery Date: 1/28/21  Time of Surgery: 8am  Where patient plans to recover: At home with family  Fax number for surgical facility: Note does not need to be faxed, will be available electronically in Epic.    Type of Anesthesia Anticipated: Local with MAC    Subjective     HPI related to upcoming procedure: Has right sided thoracic back pain, has been following with neurosurgery, had a couple of medial branch blocks, the first set got 4 hours of relief and then second set got 8 hours of relief.        Preop Questions 1/26/2021   1. Have you ever had a heart attack or stroke? No   2. Have you ever had surgery on your heart or blood vessels, such as a stent placement, a coronary artery bypass, or surgery on an artery in your head, neck, heart, or legs? No   3. Do you have chest pain with activity? No   4. Do you have a history of  heart failure? No   5. Do you currently have a cold, bronchitis or symptoms of other infection? No   6. Do you have a cough, shortness of breath, or wheezing? No   7. Do you or anyone in your family have previous history of blood clots? UNKNOWN - adopted   8. Do you or does anyone in your family have a serious bleeding problem such as prolonged bleeding following surgeries or cuts? UNKNOWN - adopted   9. Have you ever had problems with anemia or been told to take iron pills? No   10. Have you had any abnormal blood loss such as black, tarry or bloody stools, or abnormal vaginal bleeding? No   11. Have you  ever had a blood transfusion? No   12. Are you willing to have a blood transfusion if it is medically needed before, during, or after your surgery? Yes   13. Have you or any of your relatives ever had problems with anesthesia? No   14. Do you have sleep apnea, excessive snoring or daytime drowsiness? No   15. Do you have any artifical heart valves or other implanted medical devices like a pacemaker, defibrillator, or continuous glucose monitor? No   16. Do you have artificial joints? No   17. Are you allergic to latex? No   18. Is there any chance that you may be pregnant? No   Answers for HPI/ROS submitted by the patient on 1/26/2021   If you checked off any problems, how difficult have these problems made it for you to do your work, take care of things at home, or get along with other people?: Not difficult at all  PHQ9 TOTAL SCORE: 2  CHITRA 7 TOTAL SCORE: 2    Health Care Directive:  Patient does not have a Health Care Directive or Living Will: Discussed advance care planning with patient; however, patient declined at this time.    Preoperative Review of :   reviewed - no record of controlled substances prescribed.      Status of Chronic Conditions:  DEPRESSION - Patient has a long history of Depression of moderate severity requiring medication for control with recent symptoms being stable..Current symptoms of depression include none.     HYPERTENSION - Patient hasn't had a formal diagnosis of high blood pressure, but recently readings are 150-170s/90-100s    Review of Systems  CONSTITUTIONAL: NEGATIVE for fever, chills  ENT/MOUTH: NEGATIVE for ear, mouth and throat problems  RESP: NEGATIVE for significant cough or shortness of breath   CV: NEGATIVE for chest pain, palpitations or peripheral edema    Patient Active Problem List    Diagnosis Date Noted     Chronic right-sided thoracic back pain 01/26/2021     Priority: Medium     Fusion of spine of cervical region 01/10/2020     Priority: Medium     IUD  (intrauterine device) in place 04/09/2018     Priority: Medium     Mirena placed 4/9/18 for abnormal uterine bleeding.  Due for removal April 2023.         Anxiety 07/31/2013     Priority: Medium     Major depressive disorder, recurrent episode, moderate (H) 12/12/2011     Priority: Medium      Past Medical History:   Diagnosis Date     Chest pain, unspecified 11/2005    atypical - stress echo neg     Chronic depressive personality disorder      Esophageal reflux 12/11/2013     Headache 7/31/2013     Hypertension      Lateral epicondylitis 7/28/2015     Malaise and fatigue 8/1/2005     Obesity, unspecified      Other malaise and fatigue 8/2005     Polycystic ovaries     per pt     Sleep disturbance, unspecified 10/2005    see sleep study     Unspecified disorder of thyroid 9/2005    thyroid nodule. I-123 WNL.     Past Surgical History:   Procedure Laterality Date     ARTHROSCOPY SHOULDER BICEPS TENODESIS REPAIR Right 4/8/2016    Procedure: ARTHROSCOPY SHOULDER BICEPS TENODESIS REPAIR;  Surgeon: Gilbert Cortes DO;  [facility]     ARTHROSCOPY SHOULDER DECOMPRESSION Right 4/8/2016    Procedure: ARTHROSCOPY SHOULDER DECOMPRESSION;  Surgeon: Gilbert Cortes DO;  [facility]     ARTHROSCOPY SHOULDER DISTAL CLAVICLE REPAIR Right 4/8/2016    Procedure: ARTHROSCOPY SHOULDER DISTAL CLAVICLE RESECTION;  Surgeon: Gilbert Cortes DO;  [facility]     ARTHROSCOPY SHOULDER, OPEN ROTATOR CUFF REPAIR, COMBINED Right 4/8/2016    Procedure: COMBINED ARTHROSCOPY SHOULDER, OPEN ROTATOR CUFF REPAIR;  Surgeon: Gilbert Cortes DO;  [facility]     BUNIONECTOMY  10/24/2013    Procedure: BUNIONECTOMY;;  Surgeon: Ariel Mcdermott DPM;  [facility]     C LIGATE FALLOPIAN TUBE,POSTPARTUM  1998    Tubal Ligation     C STRESS ECHO TEST NL  11/2005    neg (low probability of ischemic disease)     EXCISE EXOSTOSIS FOOT  10/24/2013    Procedure: EXCISE EXOSTOSIS FOOT;  left excision  of accessory navicular, bunion repair with osteotomy, and navicular remodeling;  Surgeon: Ariel Mcdermott DPM;  [facility]     EYE SURGERY       HC RAD RX IODINE 123 SODIUM IODIDE  UCI, UP  UCI  10/2005    I 123 thryoid -WNL     HEAD & NECK SURGERY      Why is this one highlighted on yes already??     INJECT BLOCK MEDIAL BRANCH CERVICAL/THORACIC/LUMBAR Right 12/4/2020    Procedure: 10,11,12 Medial Branch Block Thoracic and Lumbar-1, Right;  Surgeon: González Gibbons MD;  [facility]     INJECT BLOCK MEDIAL BRANCH CERVICAL/THORACIC/LUMBAR Right 12/29/2020    Procedure: Right thoracic 10,11,12 and lumbar 1 medial branch blocks;  Surgeon: González Gibbons MD;  [facility]     INJECT TRIGGER POINT N/A 10/30/2020    Procedure: thoracic epidural injection T8-9 and trigger point injections thoracic;  Surgeon: González Gibbons MD;  [facility]     OSTEOTOMY FOOT  10/24/2013    Procedure: OSTEOTOMY FOOT;;  Surgeon: Ariel Mcdermott DPM;  [facility]     Z NONSPECIFIC PROCEDURE  2002    kidney stones removed     [facility] NONSPECIFIC PROCEDURE  [id number]    lasik surgery     Current Outpatient Medications   Medication Sig Dispense Refill     buPROPion (WELLBUTRIN XL) 300 MG 24 hr tablet Take 1 tablet (300 mg) by mouth every morning 90 tablet 3     Cyanocobalamin (VITAMIN B12 PO)        cyclobenzaprine (FLEXERIL) 5 MG tablet TAKE 1 TO 2 TABLETS (5MG TO 10MG) BY MOUTH THREE TIMES A DAY AS NEEDED FOR MUSCLE SPASMS 30 tablet 0     escitalopram (LEXAPRO) 10 MG tablet Take 1 tablet (10 mg) by mouth daily 90 tablet 3     fexofenadine (ALLEGRA) 180 MG tablet Take 180 mg by mouth daily       ibuprofen (ADVIL/MOTRIN) 200 MG capsule Take 2 capsule for pain       levonorgestrel (MIRENA) 20 MCG/24HR IUD 1 each by Intrauterine route once       Pyridoxine HCl (VITAMIN B-6 PO)        vitamin D3 (CHOLECALCIFEROL) 2000 units tablet Take 1 tablet by mouth daily         Allergies   Allergen Reactions     No Known  "Drug Allergies         Social History     Tobacco Use     Smoking status: Former Smoker     Packs/day: 0.10     Years: 18.00     Pack years: 1.80     Types: Cigarettes     Quit date: 2014     Years since quittin.2     Smokeless tobacco: Never Used     Tobacco comment: since    Substance Use Topics     Alcohol use: Yes     Comment: weekends <2-4 drinks       History   Drug Use No         Objective     BP (!) 170/100   Pulse 75   Temp 98.2  F (36.8  C) (Temporal)   Ht 1.626 m (5' 4\")   Wt 102.1 kg (225 lb)   SpO2 97%   BMI 38.62 kg/m      Physical Exam  GENERAL APPEARANCE: healthy, alert and no distress  HENT: ear canals and TM's normal and nose and mouth without ulcers or lesions  RESP: lungs clear to auscultation - no rales, rhonchi or wheezes  CV: regular rate and rhythm, normal S1 S2, no S3 or S4 and no murmur, click or rub   ABDOMEN: soft, nontender, no HSM or masses and bowel sounds normal  NEURO: Normal strength and tone, sensory exam grossly normal, mentation intact and speech normal    Recent Labs   Lab Test 19  1236   HGB 12.0           Diagnostics:  No labs were ordered during this visit.   No EKG required for low risk surgery (cataract, skin procedure, breast biopsy, etc).    Revised Cardiac Risk Index (RCRI):  The patient has the following serious cardiovascular risks for perioperative complications:   - No serious cardiac risks = 0 points     RCRI Interpretation: 0 points: Class I (very low risk - 0.4% complication rate)           Assessment & Plan   The proposed surgical procedure is considered LOW risk.    Preop general physical exam      Chronic right-sided thoracic back pain      Major depressive disorder, recurrent episode, moderate (H)  Well controlled, refills given.    - escitalopram (LEXAPRO) 10 MG tablet  Dispense: 90 tablet; Refill: 3  - buPROPion (WELLBUTRIN XL) 300 MG 24 hr tablet  Dispense: 90 tablet; Refill: 3    Benign essential hypertension  New diagnosis, " had hypertension in the past and did well on metoprolol, BP improved and was able to be off medication for 4 years.  Will start metoprolol again, asked her to take it tonight and again tomorrow morning.  Follow up in 1 month.    - metoprolol succinate ER (TOPROL-XL) 50 MG 24 hr tablet  Dispense: 30 tablet; Refill: 0  - Basic metabolic panel  (Ca, Cl, CO2, Creat, Gluc, K, Na, BUN)  - TSH with free T4 reflex  - CBC with platelets  - *UA reflex to Microscopic and Culture ([place] [facility] (except [place] and [place])    Severe obesity (BMI 35.0-39.9) with comorbidity (H)  Discussed portion control.    RECOMMENDATION:  APPROVAL GIVEN to proceed with proposed procedure, without further diagnostic evaluation.    Signed Electronically by: Merlene Paz MD    Copy of this evaluation report is provided to requesting physician.    Preop [facility] Preop Guidelines    Revised Cardiac Risk Index

## 2021-01-25 NOTE — PATIENT INSTRUCTIONS

## 2021-01-26 ENCOUNTER — OFFICE VISIT (OUTPATIENT)
Dept: FAMILY MEDICINE | Facility: OTHER | Age: 51
End: 2021-01-26
Payer: OTHER MISCELLANEOUS

## 2021-01-26 VITALS
BODY MASS INDEX: 38.41 KG/M2 | OXYGEN SATURATION: 97 % | TEMPERATURE: 98.2 F | HEART RATE: 75 BPM | SYSTOLIC BLOOD PRESSURE: 174 MMHG | HEIGHT: 64 IN | DIASTOLIC BLOOD PRESSURE: 104 MMHG | WEIGHT: 225 LBS

## 2021-01-26 DIAGNOSIS — G89.29 CHRONIC RIGHT-SIDED THORACIC BACK PAIN: ICD-10-CM

## 2021-01-26 DIAGNOSIS — F33.1 MAJOR DEPRESSIVE DISORDER, RECURRENT EPISODE, MODERATE (H): ICD-10-CM

## 2021-01-26 DIAGNOSIS — M54.6 CHRONIC RIGHT-SIDED THORACIC BACK PAIN: ICD-10-CM

## 2021-01-26 DIAGNOSIS — E66.01 SEVERE OBESITY (BMI 35.0-39.9) WITH COMORBIDITY (H): ICD-10-CM

## 2021-01-26 DIAGNOSIS — I10 BENIGN ESSENTIAL HYPERTENSION: ICD-10-CM

## 2021-01-26 DIAGNOSIS — Z01.818 PREOP GENERAL PHYSICAL EXAM: Primary | ICD-10-CM

## 2021-01-26 LAB
ALBUMIN UR-MCNC: NEGATIVE MG/DL
ANION GAP SERPL CALCULATED.3IONS-SCNC: 5 MMOL/L (ref 3–14)
APPEARANCE UR: CLEAR
BACTERIA #/AREA URNS HPF: ABNORMAL /HPF
BILIRUB UR QL STRIP: NEGATIVE
BUN SERPL-MCNC: 22 MG/DL (ref 7–30)
CALCIUM SERPL-MCNC: 9.2 MG/DL (ref 8.5–10.1)
CHLORIDE SERPL-SCNC: 107 MMOL/L (ref 94–109)
CO2 SERPL-SCNC: 27 MMOL/L (ref 20–32)
COLOR UR AUTO: YELLOW
CREAT SERPL-MCNC: 0.98 MG/DL (ref 0.52–1.04)
ERYTHROCYTE [DISTWIDTH] IN BLOOD BY AUTOMATED COUNT: 13.3 % (ref 10–15)
GFR SERPL CREATININE-BSD FRML MDRD: 68 ML/MIN/{1.73_M2}
GLUCOSE SERPL-MCNC: 78 MG/DL (ref 70–99)
GLUCOSE UR STRIP-MCNC: NEGATIVE MG/DL
HCT VFR BLD AUTO: 37.5 % (ref 35–47)
HGB BLD-MCNC: 12.5 G/DL (ref 11.7–15.7)
HGB UR QL STRIP: ABNORMAL
KETONES UR STRIP-MCNC: NEGATIVE MG/DL
LEUKOCYTE ESTERASE UR QL STRIP: NEGATIVE
MCH RBC QN AUTO: 29.4 PG (ref 26.5–33)
MCHC RBC AUTO-ENTMCNC: 33.3 G/DL (ref 31.5–36.5)
MCV RBC AUTO: 88 FL (ref 78–100)
NITRATE UR QL: NEGATIVE
NON-SQ EPI CELLS #/AREA URNS LPF: ABNORMAL /LPF
PH UR STRIP: 5 PH (ref 5–7)
PLATELET # BLD AUTO: 340 10E9/L (ref 150–450)
POTASSIUM SERPL-SCNC: 3.8 MMOL/L (ref 3.4–5.3)
RBC # BLD AUTO: 4.25 10E12/L (ref 3.8–5.2)
RBC #/AREA URNS AUTO: ABNORMAL /HPF
SODIUM SERPL-SCNC: 139 MMOL/L (ref 133–144)
SOURCE: ABNORMAL
SP GR UR STRIP: >1.03 (ref 1–1.03)
TSH SERPL DL<=0.005 MIU/L-ACNC: 1.48 MU/L (ref 0.4–4)
UROBILINOGEN UR STRIP-ACNC: 0.2 EU/DL (ref 0.2–1)
WBC # BLD AUTO: 6.6 10E9/L (ref 4–11)
WBC #/AREA URNS AUTO: ABNORMAL /HPF

## 2021-01-26 PROCEDURE — 80048 BASIC METABOLIC PNL TOTAL CA: CPT | Performed by: FAMILY MEDICINE

## 2021-01-26 PROCEDURE — 85027 COMPLETE CBC AUTOMATED: CPT | Performed by: FAMILY MEDICINE

## 2021-01-26 PROCEDURE — 36415 COLL VENOUS BLD VENIPUNCTURE: CPT | Performed by: FAMILY MEDICINE

## 2021-01-26 PROCEDURE — 99214 OFFICE O/P EST MOD 30 MIN: CPT | Performed by: FAMILY MEDICINE

## 2021-01-26 PROCEDURE — 81001 URINALYSIS AUTO W/SCOPE: CPT | Performed by: FAMILY MEDICINE

## 2021-01-26 PROCEDURE — 84443 ASSAY THYROID STIM HORMONE: CPT | Performed by: FAMILY MEDICINE

## 2021-01-26 RX ORDER — ESCITALOPRAM OXALATE 10 MG/1
10 TABLET ORAL DAILY
Qty: 90 TABLET | Refills: 3 | Status: SHIPPED | OUTPATIENT
Start: 2021-01-26 | End: 2022-03-29

## 2021-01-26 RX ORDER — METOPROLOL SUCCINATE 50 MG/1
50 TABLET, EXTENDED RELEASE ORAL DAILY
Qty: 30 TABLET | Refills: 0 | Status: SHIPPED | OUTPATIENT
Start: 2021-01-26 | End: 2021-02-23

## 2021-01-26 RX ORDER — BUPROPION HYDROCHLORIDE 300 MG/1
300 TABLET ORAL EVERY MORNING
Qty: 90 TABLET | Refills: 3 | Status: SHIPPED | OUTPATIENT
Start: 2021-01-26 | End: 2022-03-29

## 2021-01-26 ASSESSMENT — ANXIETY QUESTIONNAIRES
GAD7 TOTAL SCORE: 2
GAD7 TOTAL SCORE: 2
7. FEELING AFRAID AS IF SOMETHING AWFUL MIGHT HAPPEN: NOT AT ALL
GAD7 TOTAL SCORE: 2
1. FEELING NERVOUS, ANXIOUS, OR ON EDGE: SEVERAL DAYS
3. WORRYING TOO MUCH ABOUT DIFFERENT THINGS: NOT AT ALL
6. BECOMING EASILY ANNOYED OR IRRITABLE: SEVERAL DAYS
7. FEELING AFRAID AS IF SOMETHING AWFUL MIGHT HAPPEN: NOT AT ALL
5. BEING SO RESTLESS THAT IT IS HARD TO SIT STILL: NOT AT ALL
2. NOT BEING ABLE TO STOP OR CONTROL WORRYING: NOT AT ALL
4. TROUBLE RELAXING: NOT AT ALL

## 2021-01-26 ASSESSMENT — MIFFLIN-ST. JEOR: SCORE: 1630.59

## 2021-01-26 ASSESSMENT — PATIENT HEALTH QUESTIONNAIRE - PHQ9
SUM OF ALL RESPONSES TO PHQ QUESTIONS 1-9: 2
10. IF YOU CHECKED OFF ANY PROBLEMS, HOW DIFFICULT HAVE THESE PROBLEMS MADE IT FOR YOU TO DO YOUR WORK, TAKE CARE OF THINGS AT HOME, OR GET ALONG WITH OTHER PEOPLE: NOT DIFFICULT AT ALL
SUM OF ALL RESPONSES TO PHQ QUESTIONS 1-9: 2

## 2021-01-27 ENCOUNTER — ANESTHESIA EVENT (OUTPATIENT)
Dept: SURGERY | Facility: CLINIC | Age: 51
End: 2021-01-27
Payer: OTHER MISCELLANEOUS

## 2021-01-27 ASSESSMENT — LIFESTYLE VARIABLES: TOBACCO_USE: 1

## 2021-01-27 ASSESSMENT — ANXIETY QUESTIONNAIRES: GAD7 TOTAL SCORE: 2

## 2021-01-28 ENCOUNTER — HOSPITAL ENCOUNTER (OUTPATIENT)
Dept: GENERAL RADIOLOGY | Facility: CLINIC | Age: 51
End: 2021-01-28
Attending: ANESTHESIOLOGY | Admitting: ANESTHESIOLOGY
Payer: OTHER MISCELLANEOUS

## 2021-01-28 ENCOUNTER — ANESTHESIA (OUTPATIENT)
Dept: SURGERY | Facility: CLINIC | Age: 51
End: 2021-01-28
Payer: OTHER MISCELLANEOUS

## 2021-01-28 ENCOUNTER — HOSPITAL ENCOUNTER (OUTPATIENT)
Facility: CLINIC | Age: 51
Discharge: HOME | End: 2021-01-28
Attending: ANESTHESIOLOGY | Admitting: ANESTHESIOLOGY
Payer: OTHER MISCELLANEOUS

## 2021-01-28 VITALS
BODY MASS INDEX: 38.41 KG/M2 | SYSTOLIC BLOOD PRESSURE: 169 MMHG | HEART RATE: 58 BPM | OXYGEN SATURATION: 96 % | RESPIRATION RATE: 18 BRPM | HEIGHT: 64 IN | WEIGHT: 225 LBS | TEMPERATURE: 98 F | DIASTOLIC BLOOD PRESSURE: 89 MMHG

## 2021-01-28 DIAGNOSIS — M54.6 ACUTE RIGHT-SIDED THORACIC BACK PAIN: ICD-10-CM

## 2021-01-28 DIAGNOSIS — G89.29 CHRONIC RIGHT-SIDED THORACIC BACK PAIN: Primary | ICD-10-CM

## 2021-01-28 DIAGNOSIS — M54.6 CHRONIC RIGHT-SIDED THORACIC BACK PAIN: Primary | ICD-10-CM

## 2021-01-28 PROCEDURE — 250N000011 HC RX IP 250 OP 636: Performed by: NURSE ANESTHETIST, CERTIFIED REGISTERED

## 2021-01-28 PROCEDURE — 999N000141 HC STATISTIC PRE-PROCEDURE NURSING ASSESSMENT: Performed by: ANESTHESIOLOGY

## 2021-01-28 PROCEDURE — 64633 DESTROY CERV/THOR FACET JNT: CPT | Mod: RT | Performed by: ANESTHESIOLOGY

## 2021-01-28 PROCEDURE — 64634 DESTROY C/TH FACET JNT ADDL: CPT | Mod: RT | Performed by: ANESTHESIOLOGY

## 2021-01-28 PROCEDURE — 250N000009 HC RX 250: Performed by: NURSE ANESTHETIST, CERTIFIED REGISTERED

## 2021-01-28 PROCEDURE — 370N000017 HC ANESTHESIA TECHNICAL FEE, PER MIN: Performed by: ANESTHESIOLOGY

## 2021-01-28 PROCEDURE — 272N000001 HC OR GENERAL SUPPLY STERILE: Performed by: ANESTHESIOLOGY

## 2021-01-28 PROCEDURE — 258N000003 HC RX IP 258 OP 636: Performed by: NURSE ANESTHETIST, CERTIFIED REGISTERED

## 2021-01-28 PROCEDURE — 999N000179 XR SURGERY CARM FLUORO LESS THAN 5 MIN W STILLS: Mod: TC

## 2021-01-28 PROCEDURE — 710N000012 HC RECOVERY PHASE 2, PER MINUTE: Performed by: ANESTHESIOLOGY

## 2021-01-28 PROCEDURE — 250N000009 HC RX 250: Performed by: ANESTHESIOLOGY

## 2021-01-28 PROCEDURE — 360N000081 HC SURGERY LEVEL 1 W/ FLUORO, PER MIN: Performed by: ANESTHESIOLOGY

## 2021-01-28 RX ORDER — NALOXONE HYDROCHLORIDE 0.4 MG/ML
0.2 INJECTION, SOLUTION INTRAMUSCULAR; INTRAVENOUS; SUBCUTANEOUS
Status: DISCONTINUED | OUTPATIENT
Start: 2021-01-28 | End: 2021-01-28 | Stop reason: HOSPADM

## 2021-01-28 RX ORDER — PROPOFOL 10 MG/ML
INJECTION, EMULSION INTRAVENOUS PRN
Status: DISCONTINUED | OUTPATIENT
Start: 2021-01-28 | End: 2021-01-28

## 2021-01-28 RX ORDER — LIDOCAINE 40 MG/G
CREAM TOPICAL
Status: DISCONTINUED | OUTPATIENT
Start: 2021-01-28 | End: 2021-01-28 | Stop reason: HOSPADM

## 2021-01-28 RX ORDER — LIDOCAINE HYDROCHLORIDE 20 MG/ML
INJECTION, SOLUTION INFILTRATION; PERINEURAL PRN
Status: DISCONTINUED | OUTPATIENT
Start: 2021-01-28 | End: 2021-01-28

## 2021-01-28 RX ORDER — HYDROMORPHONE HYDROCHLORIDE 1 MG/ML
.3-.5 INJECTION, SOLUTION INTRAMUSCULAR; INTRAVENOUS; SUBCUTANEOUS
Status: DISCONTINUED | OUTPATIENT
Start: 2021-01-28 | End: 2021-01-28 | Stop reason: HOSPADM

## 2021-01-28 RX ORDER — GABAPENTIN 300 MG/1
300 CAPSULE ORAL
Qty: 10 CAPSULE | Refills: 1 | Status: SHIPPED | OUTPATIENT
Start: 2021-01-28 | End: 2021-02-26

## 2021-01-28 RX ORDER — NALOXONE HYDROCHLORIDE 0.4 MG/ML
0.4 INJECTION, SOLUTION INTRAMUSCULAR; INTRAVENOUS; SUBCUTANEOUS
Status: DISCONTINUED | OUTPATIENT
Start: 2021-01-28 | End: 2021-01-28 | Stop reason: HOSPADM

## 2021-01-28 RX ORDER — LIDOCAINE HYDROCHLORIDE 20 MG/ML
INJECTION, SOLUTION INFILTRATION; PERINEURAL PRN
Status: DISCONTINUED | OUTPATIENT
Start: 2021-01-28 | End: 2021-01-28 | Stop reason: HOSPADM

## 2021-01-28 RX ORDER — KETOROLAC TROMETHAMINE 30 MG/ML
30 INJECTION, SOLUTION INTRAMUSCULAR; INTRAVENOUS ONCE
Status: COMPLETED | OUTPATIENT
Start: 2021-01-28 | End: 2021-01-28

## 2021-01-28 RX ORDER — SODIUM CHLORIDE, SODIUM LACTATE, POTASSIUM CHLORIDE, CALCIUM CHLORIDE 600; 310; 30; 20 MG/100ML; MG/100ML; MG/100ML; MG/100ML
INJECTION, SOLUTION INTRAVENOUS CONTINUOUS
Status: DISCONTINUED | OUTPATIENT
Start: 2021-01-28 | End: 2021-01-28 | Stop reason: HOSPADM

## 2021-01-28 RX ORDER — FENTANYL CITRATE 50 UG/ML
INJECTION, SOLUTION INTRAMUSCULAR; INTRAVENOUS PRN
Status: DISCONTINUED | OUTPATIENT
Start: 2021-01-28 | End: 2021-01-28

## 2021-01-28 RX ORDER — HYDROCODONE BITARTRATE AND ACETAMINOPHEN 5; 325 MG/1; MG/1
1 TABLET ORAL EVERY 6 HOURS PRN
Qty: 18 TABLET | Refills: 0 | Status: SHIPPED | OUTPATIENT
Start: 2021-01-28 | End: 2021-02-02

## 2021-01-28 RX ADMIN — KETOROLAC TROMETHAMINE 30 MG: 30 INJECTION, SOLUTION INTRAMUSCULAR at 09:14

## 2021-01-28 RX ADMIN — PROPOFOL 30 MG: 10 INJECTION, EMULSION INTRAVENOUS at 08:36

## 2021-01-28 RX ADMIN — LIDOCAINE HYDROCHLORIDE 60 MG: 20 INJECTION, SOLUTION INFILTRATION; PERINEURAL at 08:36

## 2021-01-28 RX ADMIN — MIDAZOLAM 1 MG: 1 INJECTION INTRAMUSCULAR; INTRAVENOUS at 08:21

## 2021-01-28 RX ADMIN — SODIUM CHLORIDE, POTASSIUM CHLORIDE, SODIUM LACTATE AND CALCIUM CHLORIDE: 600; 310; 30; 20 INJECTION, SOLUTION INTRAVENOUS at 08:12

## 2021-01-28 RX ADMIN — FENTANYL CITRATE 50 MCG: 50 INJECTION, SOLUTION INTRAMUSCULAR; INTRAVENOUS at 08:12

## 2021-01-28 RX ADMIN — MIDAZOLAM 1 MG: 1 INJECTION INTRAMUSCULAR; INTRAVENOUS at 08:12

## 2021-01-28 RX ADMIN — HYDROMORPHONE HYDROCHLORIDE 0.2 MG: 1 INJECTION, SOLUTION INTRAMUSCULAR; INTRAVENOUS; SUBCUTANEOUS at 09:41

## 2021-01-28 RX ADMIN — HYDROMORPHONE HYDROCHLORIDE 0.3 MG: 1 INJECTION, SOLUTION INTRAMUSCULAR; INTRAVENOUS; SUBCUTANEOUS at 09:14

## 2021-01-28 ASSESSMENT — MIFFLIN-ST. JEOR: SCORE: 1630.59

## 2021-01-28 NOTE — ANESTHESIA PREPROCEDURE EVALUATION
Anesthesia Pre-Procedure Evaluation    Patient: Hannah Krishna   MRN: [id number] : 1970        Preoperative Diagnosis: Acute right-sided thoracic back pain [M54.6]   Procedure : Procedure(s):  radiofrequency ablation tthoracic 10, 11, 12 and lumabr 1     Past Medical History:   Diagnosis Date     Chest pain, unspecified 2005    atypical - stress echo neg     Chronic depressive personality disorder      Esophageal reflux 2013     Headache 2013     Hypertension      Lateral epicondylitis 2015     Malaise and fatigue 2005     Obesity, unspecified      Other malaise and fatigue 2005     Polycystic ovaries     per pt     Sleep disturbance, unspecified 10/2005    see sleep study     Unspecified disorder of thyroid 2005    thyroid nodule. I-123 WNL.      Past Surgical History:   Procedure Laterality Date     ARTHROSCOPY SHOULDER BICEPS TENODESIS REPAIR Right 2016    Procedure: ARTHROSCOPY SHOULDER BICEPS TENODESIS REPAIR;  Surgeon: Gilbert Cortes DO;  [facility]     ARTHROSCOPY SHOULDER DECOMPRESSION Right 2016    Procedure: ARTHROSCOPY SHOULDER DECOMPRESSION;  Surgeon: Gilbert Cortes DO;  [facility]     ARTHROSCOPY SHOULDER DISTAL CLAVICLE REPAIR Right 2016    Procedure: ARTHROSCOPY SHOULDER DISTAL CLAVICLE RESECTION;  Surgeon: Gilbert Cortes DO;  [facility]     ARTHROSCOPY SHOULDER, OPEN ROTATOR CUFF REPAIR, COMBINED Right 2016    Procedure: COMBINED ARTHROSCOPY SHOULDER, OPEN ROTATOR CUFF REPAIR;  Surgeon: Gilbert Cortes DO;  [facility]     BUNIONECTOMY  10/24/2013    Procedure: BUNIONECTOMY;;  Surgeon: Ariel Mcdermott DPM;  [facility]     C LIGATE FALLOPIAN TUBE,POSTPARTUM      Tubal Ligation     C STRESS ECHO TEST NL  2005    neg (low probability of ischemic disease)     EXCISE EXOSTOSIS FOOT  10/24/2013    Procedure: EXCISE EXOSTOSIS FOOT;  left excision of accessory navicular, bunion  repair with osteotomy, and navicular remodeling;  Surgeon: Ariel Mcdermott DPM;  [facility]     EYE SURGERY       HC RAD RX IODINE 123 SODIUM IODIDE  UCI, UP  UCI  10/2005    I 123 thryoid -WNL     HEAD & NECK SURGERY      Why is this one highlighted on yes already??     INJECT BLOCK MEDIAL BRANCH CERVICAL/THORACIC/LUMBAR Right 2020    Procedure: 10,11,12 Medial Branch Block Thoracic and Lumbar-1, Right;  Surgeon: González Gibbons MD;  [facility]     INJECT BLOCK MEDIAL BRANCH CERVICAL/THORACIC/LUMBAR Right 2020    Procedure: Right thoracic 10,11,12 and lumbar 1 medial branch blocks;  Surgeon: González Gibbons MD;  [facility]     INJECT TRIGGER POINT N/A 10/30/2020    Procedure: thoracic epidural injection T8-9 and trigger point injections thoracic;  Surgeon: González Gibbons MD;  [facility]     OSTEOTOMY FOOT  10/24/2013    Procedure: OSTEOTOMY FOOT;;  Surgeon: Ariel Mcdermott DPM;  [facility]     ZZC NONSPECIFIC PROCEDURE      kidney stones removed     ZZC NONSPECIFIC PROCEDURE  [id number]    lasik surgery      Allergies   Allergen Reactions     No Known Drug Allergies       Social History     Tobacco Use     Smoking status: Former Smoker     Packs/day: 0.10     Years: 18.00     Pack years: 1.80     Types: Cigarettes     Quit date: 2014     Years since quittin.2     Smokeless tobacco: Never Used     Tobacco comment: since    Substance Use Topics     Alcohol use: Yes     Comment: weekends <2-4 drinks      Wt Readings from Last 1 Encounters:   21 102.1 kg (225 lb)        Anesthesia Evaluation   Pt has had prior anesthetic. Type: General.    No history of anesthetic complications       ROS/MED HX  ENT/Pulmonary:     (+) tobacco use, Past use, 2  Pack-Year Hx,      Neurologic:  - neg neurologic ROS     Cardiovascular:     (+) Dyslipidemia hypertension-range: < 140 / 90/ ----    METS/Exercise Tolerance:     Hematologic:  - neg hematologic  ROS      Musculoskeletal:  - neg musculoskeletal ROS     GI/Hepatic:     (+) GERD,     Renal/Genitourinary:       Endo:     (+) thyroid problem, Obesity,     Psychiatric/Substance Use:     (+) psychiatric history anxiety and depression     Infectious Disease:  - neg infectious disease ROS     Malignancy:  - neg malignancy ROS     Other:            Physical Exam    Airway      Comment: Fusion of spine of cervical region    Mallampati: II   TM distance: > 3 FB   Neck ROM: full   Mouth opening: > 3 cm    Respiratory Devices and Support         Dental  no notable dental history         Cardiovascular   cardiovascular exam normal          Pulmonary   pulmonary exam normal                OUTSIDE LABS:  CBC:   Lab Results   Component Value Date    WBC 6.6 01/26/2021    WBC 5.5 08/20/2019    HGB 12.5 01/26/2021    HGB 12.0 08/20/2019    HCT 37.5 01/26/2021    HCT 36.5 08/20/2019     01/26/2021     08/20/2019     BMP:   Lab Results   Component Value Date     01/26/2021     07/08/2016    POTASSIUM 3.8 01/26/2021    POTASSIUM 4.4 07/08/2016    CHLORIDE 107 01/26/2021    CHLORIDE 108 07/08/2016    CO2 27 01/26/2021    CO2 22 07/08/2016    BUN 22 01/26/2021    BUN 9 07/08/2016    CR 0.98 01/26/2021    CR 0.86 07/08/2016    GLC 78 01/26/2021    GLC 83 02/07/2020     COAGS: No results found for: PTT, INR, FIBR  POC:   Lab Results   Component Value Date    HCG Negative 04/08/2016     HEPATIC:   Lab Results   Component Value Date    ALBUMIN 3.5 07/08/2016    PROTTOTAL 7.2 07/08/2016    ALT 27 07/08/2016    AST 17 07/08/2016    GGT 29 12/20/2005    ALKPHOS 93 07/08/2016    BILITOTAL 0.6 07/08/2016    BILIDIRECT 0.1 10/09/2002     OTHER:   Lab Results   Component Value Date    PH 7.0 04/11/2001    A1C 5.2 12/13/2005    TORREY 9.2 01/26/2021    LIPASE 38 08/31/2011    AMYLASE 55 08/31/2011    TSH 1.48 01/26/2021    T4 0.84 10/19/2005    SED 11 07/17/2013       Anesthesia Plan     History & Physical Review      ASA  Status:  2. NPO Status:  NPO Appropriate. .  Plan for MAC with Intravenous and Propofol induction. Maintenance will be TIVA. Reason for MAC:  Deep or markedly invasive procedure (G8).            Consents  Anesthesia Plan(s) and associated risks, benefits, and realistic alternatives discussed.    Questions answered and patient/representative(s) expressed understanding.    Discussed with:  Patient.       Extended Intubation/Ventilatory Support Discussed No No     Use of blood products discussed: No.          Postoperative Care            VEENA Chen CRNA

## 2021-01-28 NOTE — ANESTHESIA POSTPROCEDURE EVALUATION
Patient: Hannah Krishna    Procedure(s):  radiofrequency ablation thoracic 10, 11, 12 and lumabr 1 medial branches    Diagnosis:Acute right-sided thoracic back pain [M54.6]  Diagnosis Additional Information: No value filed.    Anesthesia Type:  MAC    Note:  Disposition: Outpatient   Postop Pain Control: Uneventful            Sign Out: Well controlled pain   PONV: No   Neuro/Psych: Uneventful            Sign Out: Acceptable/Baseline neuro status   Airway/Respiratory: Uneventful            Sign Out: Acceptable/Baseline resp. status   CV/Hemodynamics: Uneventful            Sign Out: Acceptable CV status   Other NRE: NONE   DID A NON-ROUTINE EVENT OCCUR? No         Last vitals:  Vitals:    01/28/21 0709   BP: (!) 160/92   Resp: 18   Temp: 98  F (36.7  C)       Electronically Signed By: VEENA Chen CRNA  January 28, 2021  9:07 AM

## 2021-01-28 NOTE — DISCHARGE INSTRUCTIONS
"Home Care Instructions                Procedure: Radiofrequency ablation    Activity:    Rest today    Do not work today    Resume normal activity tomorrow    Pain:    You may experience soreness at the injection site for 1 to 4 weeks.  Most patients described the postoperative pain as a \"sunburn feeling\".  This will go away over 1 to 4 weeks.    You may use an ice pack for 20 minutes every 2 hours for the first 24 hours    You may use a heating pad after the first 24 hours    You may use Tylenol  (acetaminophen) every 4 hours or other pain medicines as directed by your physician    Safety  Sedation medicine, if given may remain active for many hours.    It is important for the next 24 hours that you do not:    Drive a car    Operate machines or power tools    Consume alcohol, including beer    Sign any important papers or legal documents    You may experience numbness radiating into your legs or arms, (depending on the procedure location)  This numbness may last several hours.  Until the numb sensation returns to normal please use caution in walking, climbing stairs, stepping out of your vehicle, etc.    Common side effects of steroids:  Not everyone will experience corticosteroid side effects. If side effects are experienced they will gradually subside in the 7-10 day period following an injection.    Most common side effects include:    Flushed face and/or chest    Feeling of warmth, particularly in face but could be overall feeling of warmth    Increased blood sugar in diabetic patients    Menstrual irregularities may occur.  If taking hormone based birth control an alternate method of birth control is recommended    Sleep disturbances and/or mood swings are possible    Leg cramps    Please contact us if you have:  Severe pain   Fever more than 101.5 degrees Fahrenheit  Signs of infection (redness, swelling or drainage)      If you have questions during normal business hours (8am-5pm Monday-Friday) contact the " [facility] at [phone]. If you need help after hours, we recommend that you go to a hospital emergency room or dial 911.

## 2021-01-28 NOTE — OP NOTE
Preoperative diagnosis: Thoracolumbar spondylosis without myelopathy on the right side    Postoperative diagnosis: Same as preoperative diagnosis    Anesthesia: Monitored anesthetic care    Procedure radiofrequency ablation of the right T10, T11, T12, L1 medial branches to fully denervate the right T11-12, T12-L1, L1-L2 facet joint's.    Technique: After written and verbal informed consent was obtained including risks of infection bleeding nerve injury no help or worse pain she is brought to the procedure area placed in the prone position.  Her back was prepped and draped in the usual fashion using ChloraPrep.  Subcutaneous anesthesia with quarter cc of 1% lidocaine was used for skin.  I then advanced to 18-gauge RF needles with 10 mm active tips into each target location.  The target locations where the Watson's points at the right T11, T12, L1, L2 levels.  This corresponds to the right T10, T11, T12, L1 medial branches respectively.  AP lateral and oblique films show proper needle placement away from the nerve roots and spinal cord.  Motor stimulation up to 3 V at 2 Hz ruled out nerve root involvement and showed multifidus contraction particular at the L1 and L2 levels.  I then anesthetize each nerve with 1 cc of 2% lidocaine and then did to slightly overlapping bipolar lesions at 80  C for 60 seconds.  She tolerated the procedure well.  There were no apparent complications.  The needles were removed and a sterile bandage placed over the needle insertion sites and she is brought to the recovery room in stable condition    Follow-up: I told her that it takes about 4 weeks to fully realize the benefit of the procedure.  At that point if she is not having any substantial pain relief she should contact the [facility] for a clinic visit to determine the next step of her care.  I think she does have significant disc degeneration at the low thoracic levels and possibly doing nerve root injections in that area  could lead to establishing a diagnosis of discogenic pain or nerve root mediated pain.  At this point I think she will respond given that she had very highly positive diagnostic medial branch blocks.  I did give her a small dose of an opioid for expected postoperative pain as well as gabapentin.

## 2021-01-28 NOTE — ANESTHESIA CARE TRANSFER NOTE
Patient: Hannah Krishna    Procedure(s):  radiofrequency ablation thoracic 10, 11, 12 and lumabr 1 medial branches    Diagnosis: Acute right-sided thoracic back pain [M54.6]  Diagnosis Additional Information: No value filed.    Anesthesia Type:   MAC     Note:    Oropharynx: spontaneously breathing  Level of Consciousness: awake  Oxygen Supplementation: room air    Independent Airway: airway patency satisfactory and stable  Dentition: dentition unchanged  Vital Signs Stable: post-procedure vital signs reviewed and stable  Report to RN Given: handoff report given  Patient transferred to: Phase II    Handoff Report: Identifed the Patient, Identified the Reponsible Provider, Reviewed the pertinent medical history, Discussed the surgical course, Reviewed Intra-OP anesthesia mangement and issues during anesthesia, Set expectations for post-procedure period and Allowed opportunity for questions and acknowledgement of understanding      Vitals: (Last set prior to Anesthesia Care Transfer)  CRNA VITALS  1/28/2021 0832 - 1/28/2021 0907      1/28/2021             EKG:  Sinus bradycardia        Electronically Signed By: VEENA Chen CRNA  January 28, 2021  9:07 AM

## 2021-02-09 ENCOUNTER — VIRTUAL VISIT (OUTPATIENT)
Dept: NEUROSURGERY | Facility: CLINIC | Age: 51
End: 2021-02-09
Payer: OTHER MISCELLANEOUS

## 2021-02-09 DIAGNOSIS — M54.6 CHRONIC RIGHT-SIDED THORACIC BACK PAIN: Primary | ICD-10-CM

## 2021-02-09 DIAGNOSIS — G89.29 CHRONIC RIGHT-SIDED THORACIC BACK PAIN: Primary | ICD-10-CM

## 2021-02-09 PROCEDURE — 99213 OFFICE O/P EST LOW 20 MIN: CPT | Mod: 95 | Performed by: NURSE PRACTITIONER

## 2021-02-09 NOTE — PATIENT INSTRUCTIONS
-Return to work letter sent via [employer].  -Please contact our clinic with questions or concerns at [phone].

## 2021-02-09 NOTE — NURSING NOTE
"Hannah Krishna is a 51 year old female who presents for:  Chief Complaint   Patient presents with     Neurologic Problem     Thoracic back pain. Patient notes she has some pain but the pain is not from \"nerve pain\", she feels like it is where they went in to do the ablation. RFA 1/28/21. She thinks it is in the muscles around the area.         Vitals:    There were no vitals filed for this visit.    BMI:  Estimated body mass index is 38.62 kg/m  as calculated from the following:    Height as of 1/28/21: 5' 4\" (1.626 m).    Weight as of 1/28/21: 225 lb (102.1 kg).    Pain Score:  Mild Pain (2)        Michelle Arias CMA      "

## 2021-02-09 NOTE — LETTER
2/9/2021         RE: Hannah Krishna  [address]  [facility] [address]        Dear Colleague,    Thank you for referring your patient, Hannah Krishna, to the [facility]. Please see a copy of my visit note below.    Hannah is a 51 year old who is being evaluated via a billable telephone visit.      What phone number would you like to be contacted at? [phone]  How would you like to obtain your AVS? [employer]      HPI:Hannah Krishna is a 50 year old female presents for a follow up in regards to her right-sided mid back pain. She underwent a TESI with trigger point injections with 3 days of good relief. She states she continues to have the same pain as prior. She has undergone chiropractic therapy without much sustainable benefit. Her chiropractor is managing her work restrictions. She states the pain is aggravated by touch and ROM in the T10-L2 region.    She presents for a phone visit for follow up. She reports she was told to follow up routinely after the RFA procedure. She notes significant improvement in pre procedure. She has some muscle pain at the location of the sites. She states she is ready to go back to work. She had prior restrictions from her chiropractor for 4 hours per day until the end of February. He is deferring further restrictions to our office.    Medical, surgical, family, and social history unchanged since prior exam.    ROS: 10 point ROS neg other than the symptoms noted above in the HPI.    Imaging: Nothing new to review.    Assessment/Plan:  Thoracic back pain. She may continue 4 hour work restriction until March 1st. At which time she may work 8 hours. She is agreeable to this. She will contact our office if symptoms worsen. She verbalized understanding and agreement.    Patient Instructions   -Return to work letter sent via [employer].  -Please contact our clinic with questions or concerns at [phone].        Phone call start: 1500  Phone call end:  1524  Phone call duration: 24 minutes    Lynnette Membreno, PEDRO  [address] [address]  Tel [phone]  Fax [phone]          Again, thank you for allowing me to participate in the care of your patient.        Sincerely,        Lynnette Membreno, NP

## 2021-02-09 NOTE — PROGRESS NOTES
Hannah is a 51 year old who is being evaluated via a billable telephone visit.      What phone number would you like to be contacted at? [phone]  How would you like to obtain your AVS? [employer]      HPI:Hannah Krishna is a 50 year old female presents for a follow up in regards to her right-sided mid back pain. She underwent a TESI with trigger point injections with 3 days of good relief. She states she continues to have the same pain as prior. She has undergone chiropractic therapy without much sustainable benefit. Her chiropractor is managing her work restrictions. She states the pain is aggravated by touch and ROM in the T10-L2 region.    She presents for a phone visit for follow up. She reports she was told to follow up routinely after the RFA procedure. She notes significant improvement in pre procedure. She has some muscle pain at the location of the sites. She states she is ready to go back to work. She had prior restrictions from her chiropractor for 4 hours per day until the end of February. He is deferring further restrictions to our office.    Medical, surgical, family, and social history unchanged since prior exam.    ROS: 10 point ROS neg other than the symptoms noted above in the HPI.    Imaging: Nothing new to review.    Assessment/Plan:  Thoracic back pain. She may continue 4 hour work restriction until March 1st. At which time she may work 8 hours. She is agreeable to this. She will contact our office if symptoms worsen. She verbalized understanding and agreement.    Patient Instructions   -Return to work letter sent via [employer].  -Please contact our clinic with questions or concerns at [phone].        Phone call start: 1500  Phone call end: 1524  Phone call duration: 24 minutes    Lynnette Membreno, [facility] Neurosurgery  [address] [place] [id number]  Tel [phone]  Fax [phone]

## 2021-02-09 NOTE — LETTER
[facility]  Neurosurgery Clinic  [address] [place] [address]  [id number]         February 9, 2021    To Whom it May Concern,      Hannah PAYNE Erendira is being seen at our clinic for spine care. She may continue her current restrictions of 4 hours per day until March 1, 2021 when she may return to 8 hours per day without restrictions.      Sincerely,            Lynnette Membreno CNP  [facility]  Neurosurgery [address]  [address]  [phone]

## 2021-02-17 ENCOUNTER — DOCUMENTATION ONLY (OUTPATIENT)
Dept: NEUROSURGERY | Facility: CLINIC | Age: 51
End: 2021-02-17

## 2021-02-17 NOTE — PROGRESS NOTES
Faxed OV note 2/9/21 February 17, 2021 to fax number [phone] [employer]    Right Fax confirmed at 2:46 PM    Miracle Schmitt MA

## 2021-02-23 DIAGNOSIS — I10 BENIGN ESSENTIAL HYPERTENSION: ICD-10-CM

## 2021-02-23 RX ORDER — METOPROLOL SUCCINATE 50 MG/1
50 TABLET, EXTENDED RELEASE ORAL DAILY
Qty: 30 TABLET | Refills: 0 | Status: SHIPPED | OUTPATIENT
Start: 2021-02-23 | End: 2021-02-26

## 2021-02-23 NOTE — PROGRESS NOTES
"    Assessment & Plan     Benign essential hypertension  Well controlled, continue metoprolol.    - metoprolol succinate ER (TOPROL-XL) 50 MG 24 hr tablet; Take 1 tablet (50 mg) by mouth daily    Special screening for malignant neoplasms, colon  Declines colonoscopy, will do FIT    - Fecal colorectal cancer screen (FIT); Future    Visit for screening mammogram    - *MA Screening Digital Bilateral; Future    MD GÓMEZ Gutierrez [facility] KIKE Young is a 51 year old who presents for the following health issues     History of Present Illness       Hypertension: She presents for follow up of hypertension.  She does check blood pressure  regularly outside of the clinic. Outpatient blood pressures have not been over 140/90. She does not follow a low salt diet.     She eats 2-3 servings of fruits and vegetables daily.She consumes 0 sweetened beverage(s) daily.She exercises with enough effort to increase her heart rate 10 to 19 minutes per day.  She exercises with enough effort to increase her heart rate 3 or less days per week.   She is taking medications regularly.   Answers for HPI/ROS submitted by the patient on 2/26/2021   Chronic problems general questions HPI Form  If you checked off any problems, how difficult have these problems made it for you to do your work, take care of things at home, or get along with other people?: Not difficult at all  PHQ9 TOTAL SCORE: 0  CHITRA 7 TOTAL SCORE: 0    Working on portion control to help lose weight.    Injection relieved her back pain.  She is hoping to be more active, looking into [employer].  Has a few more Physical Therapy sessions.        Objective    /78   Pulse 56   Temp 97.1  F (36.2  C) (Temporal)   Ht 1.626 m (5' 4\")   Wt 101.2 kg (223 lb)   SpO2 98%   BMI 38.28 kg/m    Body mass index is 38.28 kg/m .  Physical Exam   Gen:  no apparent distress  Chest/CV: S1 and S2 normal, no murmurs, clicks, gallops or rubs. " Regular rate and rhythm. Chest is clear; no wheezes or rales. No edema.

## 2021-02-26 ENCOUNTER — OFFICE VISIT (OUTPATIENT)
Dept: FAMILY MEDICINE | Facility: OTHER | Age: 51
End: 2021-02-26
Payer: COMMERCIAL

## 2021-02-26 VITALS
DIASTOLIC BLOOD PRESSURE: 78 MMHG | OXYGEN SATURATION: 98 % | TEMPERATURE: 97.1 F | HEART RATE: 56 BPM | WEIGHT: 223 LBS | BODY MASS INDEX: 38.07 KG/M2 | SYSTOLIC BLOOD PRESSURE: 130 MMHG | HEIGHT: 64 IN

## 2021-02-26 DIAGNOSIS — Z12.11 SPECIAL SCREENING FOR MALIGNANT NEOPLASMS, COLON: ICD-10-CM

## 2021-02-26 DIAGNOSIS — I10 BENIGN ESSENTIAL HYPERTENSION: Primary | ICD-10-CM

## 2021-02-26 DIAGNOSIS — Z12.31 VISIT FOR SCREENING MAMMOGRAM: ICD-10-CM

## 2021-02-26 PROCEDURE — 99213 OFFICE O/P EST LOW 20 MIN: CPT | Performed by: FAMILY MEDICINE

## 2021-02-26 RX ORDER — METOPROLOL SUCCINATE 50 MG/1
50 TABLET, EXTENDED RELEASE ORAL DAILY
Qty: 90 TABLET | Refills: 3 | Status: SHIPPED | OUTPATIENT
Start: 2021-02-26 | End: 2021-03-24

## 2021-02-26 ASSESSMENT — ANXIETY QUESTIONNAIRES
4. TROUBLE RELAXING: NOT AT ALL
GAD7 TOTAL SCORE: 0
3. WORRYING TOO MUCH ABOUT DIFFERENT THINGS: NOT AT ALL
2. NOT BEING ABLE TO STOP OR CONTROL WORRYING: NOT AT ALL
5. BEING SO RESTLESS THAT IT IS HARD TO SIT STILL: NOT AT ALL
6. BECOMING EASILY ANNOYED OR IRRITABLE: NOT AT ALL
GAD7 TOTAL SCORE: 0
7. FEELING AFRAID AS IF SOMETHING AWFUL MIGHT HAPPEN: NOT AT ALL
7. FEELING AFRAID AS IF SOMETHING AWFUL MIGHT HAPPEN: NOT AT ALL
GAD7 TOTAL SCORE: 0
1. FEELING NERVOUS, ANXIOUS, OR ON EDGE: NOT AT ALL

## 2021-02-26 ASSESSMENT — MIFFLIN-ST. JEOR: SCORE: 1616.52

## 2021-02-27 ASSESSMENT — ANXIETY QUESTIONNAIRES: GAD7 TOTAL SCORE: 0

## 2021-02-27 ASSESSMENT — PATIENT HEALTH QUESTIONNAIRE - PHQ9: SUM OF ALL RESPONSES TO PHQ QUESTIONS 1-9: 0

## 2021-03-03 ENCOUNTER — RECORDS - HEALTHEAST (OUTPATIENT)
Dept: LAB | Facility: CLINIC | Age: 51
End: 2021-03-03

## 2021-03-03 LAB
SARS-COV-2 PCR COMMENT: NORMAL
SARS-COV-2 RNA SPEC QL NAA+PROBE: NEGATIVE
SARS-COV-2 VIRUS SPECIMEN SOURCE: NORMAL

## 2021-03-13 ENCOUNTER — HEALTH MAINTENANCE LETTER (OUTPATIENT)
Age: 51
End: 2021-03-13

## 2021-03-24 DIAGNOSIS — I10 BENIGN ESSENTIAL HYPERTENSION: ICD-10-CM

## 2021-03-24 RX ORDER — METOPROLOL SUCCINATE 50 MG/1
TABLET, EXTENDED RELEASE ORAL
Qty: 90 TABLET | Refills: 3 | Status: SHIPPED | OUTPATIENT
Start: 2021-03-24 | End: 2022-03-29

## 2021-03-30 ENCOUNTER — HOSPITAL ENCOUNTER (OUTPATIENT)
Dept: MAMMOGRAPHY | Facility: CLINIC | Age: 51
Discharge: HOME | End: 2021-03-30
Attending: FAMILY MEDICINE | Admitting: FAMILY MEDICINE
Payer: COMMERCIAL

## 2021-03-30 DIAGNOSIS — Z12.31 VISIT FOR SCREENING MAMMOGRAM: ICD-10-CM

## 2021-03-30 PROCEDURE — 77067 SCR MAMMO BI INCL CAD: CPT

## 2021-05-27 ENCOUNTER — RECORDS - HEALTHEAST (OUTPATIENT)
Dept: LAB | Facility: CLINIC | Age: 51
End: 2021-05-27

## 2021-07-01 DIAGNOSIS — Z12.11 SPECIAL SCREENING FOR MALIGNANT NEOPLASMS, COLON: ICD-10-CM

## 2021-07-01 LAB — HEMOCCULT STL QL IA: NEGATIVE

## 2021-07-01 PROCEDURE — 82274 ASSAY TEST FOR BLOOD FECAL: CPT | Performed by: FAMILY MEDICINE

## 2021-08-13 ENCOUNTER — OFFICE VISIT (OUTPATIENT)
Dept: FAMILY MEDICINE | Facility: OTHER | Age: 51
End: 2021-08-13
Payer: OTHER MISCELLANEOUS

## 2021-08-13 VITALS
BODY MASS INDEX: 35.85 KG/M2 | RESPIRATION RATE: 20 BRPM | OXYGEN SATURATION: 98 % | WEIGHT: 210 LBS | TEMPERATURE: 97.3 F | DIASTOLIC BLOOD PRESSURE: 88 MMHG | SYSTOLIC BLOOD PRESSURE: 132 MMHG | HEART RATE: 51 BPM | HEIGHT: 64 IN

## 2021-08-13 DIAGNOSIS — M54.6 ACUTE RIGHT-SIDED THORACIC BACK PAIN: Primary | ICD-10-CM

## 2021-08-13 PROCEDURE — 99213 OFFICE O/P EST LOW 20 MIN: CPT | Performed by: FAMILY MEDICINE

## 2021-08-13 RX ORDER — CYCLOBENZAPRINE HCL 5 MG
5 TABLET ORAL 3 TIMES DAILY PRN
Qty: 30 TABLET | Refills: 1 | Status: SHIPPED | OUTPATIENT
Start: 2021-08-13 | End: 2022-03-18

## 2021-08-13 ASSESSMENT — PATIENT HEALTH QUESTIONNAIRE - PHQ9
SUM OF ALL RESPONSES TO PHQ QUESTIONS 1-9: 0
SUM OF ALL RESPONSES TO PHQ QUESTIONS 1-9: 0
10. IF YOU CHECKED OFF ANY PROBLEMS, HOW DIFFICULT HAVE THESE PROBLEMS MADE IT FOR YOU TO DO YOUR WORK, TAKE CARE OF THINGS AT HOME, OR GET ALONG WITH OTHER PEOPLE: NOT DIFFICULT AT ALL

## 2021-08-13 ASSESSMENT — ANXIETY QUESTIONNAIRES
4. TROUBLE RELAXING: NOT AT ALL
GAD7 TOTAL SCORE: 0
GAD7 TOTAL SCORE: 0
5. BEING SO RESTLESS THAT IT IS HARD TO SIT STILL: NOT AT ALL
7. FEELING AFRAID AS IF SOMETHING AWFUL MIGHT HAPPEN: NOT AT ALL
GAD7 TOTAL SCORE: 0
1. FEELING NERVOUS, ANXIOUS, OR ON EDGE: NOT AT ALL
7. FEELING AFRAID AS IF SOMETHING AWFUL MIGHT HAPPEN: NOT AT ALL
3. WORRYING TOO MUCH ABOUT DIFFERENT THINGS: NOT AT ALL
8. IF YOU CHECKED OFF ANY PROBLEMS, HOW DIFFICULT HAVE THESE MADE IT FOR YOU TO DO YOUR WORK, TAKE CARE OF THINGS AT HOME, OR GET ALONG WITH OTHER PEOPLE?: NOT DIFFICULT AT ALL
6. BECOMING EASILY ANNOYED OR IRRITABLE: NOT AT ALL
2. NOT BEING ABLE TO STOP OR CONTROL WORRYING: NOT AT ALL

## 2021-08-13 ASSESSMENT — PAIN SCALES - GENERAL: PAINLEVEL: MODERATE PAIN (5)

## 2021-08-13 ASSESSMENT — MIFFLIN-ST. JEOR: SCORE: 1557.8

## 2021-08-13 NOTE — PROGRESS NOTES
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  Pain is limiting her ability to bend and twist.  I encouraged her to continue using the ibuprofen that she is at home.  We will add a muscle relaxer to use as needed.  I would like her to limit her lifting and bending.  We will put these restrictions on for 2 weeks.  We will actually evaluate her in about a week and a half.  She is not improving at that time would consider physical therapy.    - cyclobenzaprine (FLEXERIL) 5 MG tablet; Take 1 tablet (5 mg) by mouth 3 times daily as needed for muscle spasms    Return in 11 days (on 8/24/2021) for back pain follow up.    Merlene Paz MD  [facility]    Filemon Young is a 51 year old who presents for the following health issues     Patient here today with mid back pain from work injury. 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 States she heard a pop in her back, but had to keep helping the patient to get up.  Didn't tell boss immediately, was busy and went and told her boss a few hours later, found out afterwards that the patient was supposed to be a 2 person transfer to get her up.  Current back pain 5/10, pain wraps around mid back at bra line.  Pain can be stabbing and takes her breath away, sometimes it feel tingly and sometimes it is just a dull pain.  Has been alternating ice and heat.  At night uses the heating pad, shuts off automatically.  Using Advil OTC 4 tabs twice daily.  She did work for a couple days afterwards and found it very uncomfortable.    History of Present Illness       Back Pain:  She presents for follow up of back pain. Patient's back pain is a new problem.    Original cause of back pain: work related injury  First noticed back pain: in the last week  Patient feels back pain:  "comes and goesLocation of back pain:  Right middle of back  Description of back pain: dull ache, gnawing and stabbing  Back pain spreads: nowhere and right shoulder    Since patient first noticed back pain, pain is: always present, but gets better and worse  Does back pain interfere with her job:  Yes  On a scale of 1-10 (10 being the worst), patient describes pain as:  4  What makes back pain worse: bending and twisting  Acupuncture: not tried  Acetaminophen: not tried  Activity or exercise: not tried  Chiropractor:  Not tried  Cold: helpful  Heat: helpful  Massage: not tried  Muscle relaxants: not tried  NSAIDS: helpful  Opioids: not tried  Physical Therapy: not tried  Rest: helpful  Steroid Injection: not tried  Stretching: not tried  Surgery: not tried  TENS unit: not tried  Topical pain relievers: not tried  Other healthcare providers patient is seeing for back pain: None    She eats 2-3 servings of fruits and vegetables daily.She consumes 0 sweetened beverage(s) daily.She exercises with enough effort to increase her heart rate 10 to 19 minutes per day.  She exercises with enough effort to increase her heart rate 3 or less days per week.   She is taking medications regularly.         Objective    /88   Pulse 51   Temp 97.3  F (36.3  C) (Temporal)   Resp 20   Ht 1.626 m (5' 4.02\")   Wt 95.3 kg (210 lb)   SpO2 98%   BMI 36.03 kg/m    Body mass index is 36.03 kg/m .  Physical Exam   Gen: no apparent distress  Psych: Alert and oriented times 3; coherent speech, normal   rate and volume, able to articulate logical thoughts, able   to abstract reason, no tangential thoughts, no hallucinations   or delusions  Her affect is neutral  Back pain exam:  Tenderness of Right mid thoracic area from her back to her lateral right side., Pain limits the following motions: Twisting and lateral bending and Lower extremity sensation normal and equal on both sides          "

## 2021-08-14 ASSESSMENT — PATIENT HEALTH QUESTIONNAIRE - PHQ9: SUM OF ALL RESPONSES TO PHQ QUESTIONS 1-9: 0

## 2021-08-14 ASSESSMENT — ANXIETY QUESTIONNAIRES: GAD7 TOTAL SCORE: 0

## 2021-08-23 NOTE — PROGRESS NOTES
Assessment & Plan     Acute right-sided thoracic back pain  Patient had a work-related injury with right sided thoracic pain.  She is not improving but is not worsening.  At this time we will start physical therapy.  We will continue her work restrictions of no lifting more than 20 pounds and limiting bending and twisting.  We will follow-up in 1 month and if she is not improving at that time would then consider MRI as she is having some thoracic radicular pain.    - ADE PT and Hand Referral; Future    Merlene Paz MD  [facility]    Filemon Young is a 51 year old who presents for the following health issues     HPI     Back Pain:  She presents for follow up of back pain 2 week check. She states she can't take muscle relaxer's at work because of drowsiness. Today is a lot worse. 6/10 today.  Pain is in her mid thoracic spine and wraps around her right side.  She feels the pain is about the same as when I first saw her.     Original cause of back pain: work related injury  First noticed back pain: 8/9/21  Patient feels back pain: comes and goes  Location of back pain:  Right middle of back, rotates around to the right side.  Description of back pain: dull ache, gnawing and stabbing in the side of back.   Back pain spreads: tingling in the right shoulder     Since patient first noticed back pain, pain is: always present, but gets better and worse  Does back pain interfere with her job:  Yes  On a scale of 1-10 (10 being the worst), patient describes pain as:  6  What makes back pain worse: bending and twisting  Acupuncture: not tried  Acetaminophen: not tried  Activity or exercise: not tried  Chiropractor:  Not tried  Cold: helpful  Heat: helpful  Massage: not tried  Muscle relaxants: YES  NSAIDS: helpful  Opioids: not tried  Physical Therapy: not tried  Rest: helpful  Steroid Injection: not tried  Stretching: not tried  Surgery: not tried  TENS unit: not tried  Topical pain  "relievers: not tried  Other healthcare providers patient is seeing for back pain: None        Objective    /70   Pulse 55   Temp 98.5  F (36.9  C) (Temporal)   Resp 20   Ht 1.63 m (5' 4.17\")   Wt 97.1 kg (214 lb)   SpO2 100%   BMI 36.54 kg/m    Body mass index is 36.54 kg/m .  Physical Exam   Gen: no apparent distress  Back: Tender to palpation mid thoracic spine just to the right side.  No skin abnormality.  Back range of motion decreased with forward flexion, backward extension and lateral bending.  She walks without assistive device.  Psych: Alert and oriented times 3; coherent speech, normal   rate and volume, able to articulate logical thoughts, able   to abstract reason, no tangential thoughts, no hallucinations   or delusions  Her affect is neutral          "

## 2021-08-24 ENCOUNTER — OFFICE VISIT (OUTPATIENT)
Dept: FAMILY MEDICINE | Facility: OTHER | Age: 51
End: 2021-08-24
Payer: OTHER MISCELLANEOUS

## 2021-08-24 VITALS
SYSTOLIC BLOOD PRESSURE: 130 MMHG | BODY MASS INDEX: 36.54 KG/M2 | RESPIRATION RATE: 20 BRPM | HEIGHT: 64 IN | DIASTOLIC BLOOD PRESSURE: 70 MMHG | OXYGEN SATURATION: 100 % | TEMPERATURE: 98.5 F | HEART RATE: 55 BPM | WEIGHT: 214 LBS

## 2021-08-24 DIAGNOSIS — M54.6 ACUTE RIGHT-SIDED THORACIC BACK PAIN: Primary | ICD-10-CM

## 2021-08-24 PROCEDURE — 99213 OFFICE O/P EST LOW 20 MIN: CPT | Performed by: FAMILY MEDICINE

## 2021-08-24 ASSESSMENT — MIFFLIN-ST. JEOR: SCORE: 1578.45

## 2021-08-24 ASSESSMENT — PAIN SCALES - GENERAL: PAINLEVEL: SEVERE PAIN (6)

## 2021-08-24 NOTE — LETTER
REPORT OF WORK COMP    [address]  [facility] [address] [phone]      PATIENT DATA    Employee Name: Hannah Krishna      : 1970     #: [id number]    Work related injury: Yes  Employer at time of injury: [employer]  Employer contact & phone:   Employed elsewhere? No  Workers' Compensation Carrier/Managed Care Plan:      Today's date: 2021  Date of injury: 21  Date of first visit: 21    PROVIDER EVALUATION: Please fill in as needed.  Please give copy to employee for employer.    1. Diagnosis: acute thoracic back sprain    2. Treatment: referring to Physical Therapy   3. Medication: Flexeril and ibuprofen  NOTE: When ordering a medication, [place] Rules require Work Comp or WC on prescriptions.    4. No work N/A  5. Return to work date: 2021    ** WITH RESTRICTIONS? Yes, with work restrictions: * Restricted lifting - Maximum lift in pounds: 20  * Restricted bending and twisting--can do sometimes      RESTRICTIONS: Unlimited unless listed.  Restrictions apply to home and leisure also.  If work restrictions is not available, the employee is totally disabled.    Maximum Medical Improvement (Date):   Any Permanent Partial Disability? Deferred to future exam/consult.    Provider comments:  Not improving, but not worsening, will refer to Physical Therapy, continue same work restrictions    Medical Examiner: Merlene Paz MD           License or registration: [id number]    Next appointment: 1 month    CC: Employer, Managed Care Plan/Payor, Patient

## 2021-08-27 ENCOUNTER — THERAPY VISIT (OUTPATIENT)
Dept: PHYSICAL THERAPY | Facility: CLINIC | Age: 51
End: 2021-08-27
Attending: FAMILY MEDICINE
Payer: OTHER MISCELLANEOUS

## 2021-08-27 DIAGNOSIS — M54.6 RIGHT-SIDED THORACIC BACK PAIN: Primary | ICD-10-CM

## 2021-08-27 DIAGNOSIS — M54.6 ACUTE RIGHT-SIDED THORACIC BACK PAIN: ICD-10-CM

## 2021-08-27 PROCEDURE — 97110 THERAPEUTIC EXERCISES: CPT | Mod: GP | Performed by: PHYSICAL THERAPIST

## 2021-08-27 PROCEDURE — 97140 MANUAL THERAPY 1/> REGIONS: CPT | Mod: GP | Performed by: PHYSICAL THERAPIST

## 2021-08-27 PROCEDURE — 97161 PT EVAL LOW COMPLEX 20 MIN: CPT | Mod: GP | Performed by: PHYSICAL THERAPIST

## 2021-08-27 NOTE — PROGRESS NOTES
Physical Therapy Initial Evaluation  Subjective:  The history is provided by the patient. No  was used.   Patient Health History  Hannah Krishna being seen for Back sprain.     Problem began: 2021.   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  General health as reported by patient is good.  Pertinent medical history includes: depression, high blood pressure, numbness/tingling and overweight.   Red flags:  None as reported by patient.  Medical allergies: none.   Surgeries include:  Orthopedic surgery. Other surgery history details: both shoudlers , left foot and low back .    Current medications:  Anti-depressants, high blood pressure medication and muscle relaxants.    Current occupation is CNA.   Primary job tasks include:  Lifting/carrying, prolonged standing, repetitive tasks and pushing/pulling.                  Therapist Generated HPI Evaluation  Problem details: Helping a lady get out of bed. Transferred from lying down to sitting upright. Helped her sit up and turn. .         Type of problem:  Thoracic.    This is a new condition.  Condition occurred with:  Bending, lifting and twisting.  Where condition occurred: at work.  Patient reports pain:  Thoracic spine right.  Pain is described as aching and is constant.  Pain radiates to:  Other (wraps around the rib to anterior side). Pain is the same all the time.  Since onset symptoms are unchanged.  Associated symptoms:  Loss of motion/stiffness, loss of strength and tingling. Symptoms are exacerbated by bending, lifting and twisting  and relieved by NSAID's, muscle relaxants and heat.    There was none improvement following previous treatment.  Restrictions due to condition include:  Working in normal job with restrictions.  Barriers include:  Stairs.                        Objective:  System    Physical Exam    General     ROS  Hannah Krishna , : 1970, MRN:  [id number]    Physical Therapy Objective Findings  Subjective information, goals, clinical impression, daily documentation and other information found in EPISODES tab.  Objective:     Lumbar Pain    Posture: mild lordosis in lower back. Flatter thoracic spine.   Gait:  normal  Lumbar Range of Motion:  Flexion                                                40% flexion until pain                                                                                                                            Extension 30%   Right Side Bending 50% with pain    Left Side Bending 80% with pain in right side    Repeated extension- prone Increased motion in extension, decreased pain in the ribs. Mild increase in rotation.    Repeated flexion- standing Pain centralizes to back, feels tightness, a level of muscle guarding. No change in motion     Thoracic Rotation: R 30% L 40%  Segmental Mobility: PA restricted mobility in T9-T7     Palpation: Posterior displaced rib at T7    Assessment/Plan:    Patient is a 51 year old female with thoracic complaints.    Patient has the following significant findings with corresponding treatment plan.               Diagnosis 1: T7-T9 segmental irritation  Posterior rib displacement resulting is possible disc irritation   Pain -  hot/cold therapy  Decreased ROM/flexibility - manual therapy, therapeutic exercise and home program  Decreased strength - therapeutic exercise, therapeutic activities and home program  Decreased function - therapeutic activities and home program    Therapy Evaluation Codes:   1) History comprised of:   Personal factors that impact the plan of care:      Past/current experiences.    Comorbidity factors that impact the plan of care are:      None.     Medications impacting care: None.  2) Examination of Body Systems comprised of:   Body structures and functions that impact the plan of care:      Shoulder.   Activity limitations that impact the plan of care are:      Bending,  Dressing and Lifting.  3) Clinical presentation characteristics are:   Stable/Uncomplicated.  4) Decision-Making    Low complexity using standardized patient assessment instrument and/or measureable assessment of functional outcome.  Cumulative Therapy Evaluation is: Low complexity.    Previous and current functional limitations:  (See Goal Flow Sheet for this information)    Short term and Long term goals: (See Goal Flow Sheet for this information)     Communication ability:  Patient appears to be able to clearly communicate and understand verbal and written communication and follow directions correctly.  Treatment Explanation - The following has been discussed with the patient:   RX ordered/plan of care  Anticipated outcomes  Possible risks and side effects  This patient would benefit from PT intervention to resume normal activities.   Rehab potential is good.    Frequency:  1 X week, once daily  Duration:  for 6 weeks  Discharge Plan:  Achieve all LTG.  Independent in home treatment program.  Reach maximal therapeutic benefit.    Please refer to the daily flowsheet for treatment today, total treatment time and time spent performing 1:1 timed codes.     Moshe Bhakta,PT, DPT, OCS  Bernabe Fagan, SPT

## 2021-09-02 ENCOUNTER — THERAPY VISIT (OUTPATIENT)
Dept: PHYSICAL THERAPY | Facility: CLINIC | Age: 51
End: 2021-09-02
Payer: OTHER MISCELLANEOUS

## 2021-09-02 DIAGNOSIS — M54.6 ACUTE RIGHT-SIDED THORACIC BACK PAIN: Primary | ICD-10-CM

## 2021-09-02 PROCEDURE — 97140 MANUAL THERAPY 1/> REGIONS: CPT | Mod: GP | Performed by: PHYSICAL THERAPIST

## 2021-09-02 PROCEDURE — 97110 THERAPEUTIC EXERCISES: CPT | Mod: GP | Performed by: PHYSICAL THERAPIST

## 2021-09-02 PROCEDURE — 97112 NEUROMUSCULAR REEDUCATION: CPT | Mod: GP | Performed by: PHYSICAL THERAPIST

## 2021-09-09 ENCOUNTER — THERAPY VISIT (OUTPATIENT)
Dept: PHYSICAL THERAPY | Facility: CLINIC | Age: 51
End: 2021-09-09
Payer: OTHER MISCELLANEOUS

## 2021-09-09 DIAGNOSIS — M54.6 ACUTE RIGHT-SIDED THORACIC BACK PAIN: Primary | ICD-10-CM

## 2021-09-09 PROCEDURE — 97035 APP MDLTY 1+ULTRASOUND EA 15: CPT | Mod: GP | Performed by: PHYSICAL THERAPIST

## 2021-09-09 PROCEDURE — 97140 MANUAL THERAPY 1/> REGIONS: CPT | Mod: GP | Performed by: PHYSICAL THERAPIST

## 2021-09-09 PROCEDURE — 97110 THERAPEUTIC EXERCISES: CPT | Mod: GP | Performed by: PHYSICAL THERAPIST

## 2021-09-20 NOTE — PROGRESS NOTES
Assessment & Plan     Acute right-sided thoracic back pain  Pain is not improving significantly despite physical therapy.  As this has been ongoing for 5 weeks without appreciable improvement will obtain x-ray.  X-ray without obvious compression fracture, awaiting radiology overread and if nothing else is found, would recommend MRI.  If MRI is done, depending on results would then recommend spine specialist.  Patient has had injections in the past with good results for prior injury.    - XR Thoracic Spine 2 Views; Future    Merlene Paz MD  [facility]    Filemon Young is a 51 year old who presents for the following health issues     HPI     Back Pain  Onset/Duration: august 9th   Description:   Location of pain: middle of back bilateral  Character of pain: dull ache- stabbing   Pain radiation: ribs   New numbness or weakness in legs, not attributed to pain: no   Intensity: Currently 4/10  Progression of Symptoms: same  History:   Specific cause: work related  Pain interferes with job: YES  History of back problems: recurrent self limited episodes of low back pain in the past  Any previous MRI or X-rays: None  Sees a specialist for back pain: No  Alleviating factors:   Improved by: unknown     Precipitating factors:  Worsened by: Lifting and Bending  Therapies tried and outcome: Physical Therapy- unsure if this is working    Still having pain, not having as sharp of pain around her right back.  Been going to Physical Therapy, has appointment this afternoon.  Bending and twisting of her back causes pain.  She is not doing any lifting secondary to pain.    Using Flexeril prn, can help with sleep.  Finds that it helps. Hasn't gotten her refill yet.  Takes 4 ibuprofen twice a day.        Objective    /80   Pulse 68   Temp 97.3  F (36.3  C) (Temporal)   Resp 16   Wt 103.4 kg (228 lb)   SpO2 98%   BMI 38.93 kg/m    Body mass index is 38.93 kg/m .  Physical Exam   Gen:  no apparent distress  Back: Tender over the right parathoracic musculature.  No skin disruption.  Range of motion of her back is limited in all directions secondary to pain.  Sensation appears intact in the thoracic area.  Neuro: Normal gait, heel gait and toe gait.  Walks without assistive device    Thoracic x-ray: Reviewed by myself, mild degenerative changes seen.  Radiology review pending.

## 2021-09-21 ENCOUNTER — OFFICE VISIT (OUTPATIENT)
Dept: FAMILY MEDICINE | Facility: OTHER | Age: 51
End: 2021-09-21
Payer: OTHER MISCELLANEOUS

## 2021-09-21 ENCOUNTER — ANCILLARY PROCEDURE (OUTPATIENT)
Dept: GENERAL RADIOLOGY | Facility: OTHER | Age: 51
End: 2021-09-21
Attending: FAMILY MEDICINE
Payer: OTHER MISCELLANEOUS

## 2021-09-21 ENCOUNTER — THERAPY VISIT (OUTPATIENT)
Dept: PHYSICAL THERAPY | Facility: CLINIC | Age: 51
End: 2021-09-21
Payer: OTHER MISCELLANEOUS

## 2021-09-21 VITALS
BODY MASS INDEX: 38.93 KG/M2 | WEIGHT: 228 LBS | RESPIRATION RATE: 16 BRPM | OXYGEN SATURATION: 98 % | TEMPERATURE: 97.3 F | SYSTOLIC BLOOD PRESSURE: 118 MMHG | HEART RATE: 68 BPM | DIASTOLIC BLOOD PRESSURE: 80 MMHG

## 2021-09-21 DIAGNOSIS — M54.6 ACUTE RIGHT-SIDED THORACIC BACK PAIN: ICD-10-CM

## 2021-09-21 DIAGNOSIS — M54.6 CHRONIC RIGHT-SIDED THORACIC BACK PAIN: Primary | ICD-10-CM

## 2021-09-21 DIAGNOSIS — G89.29 CHRONIC RIGHT-SIDED THORACIC BACK PAIN: Primary | ICD-10-CM

## 2021-09-21 DIAGNOSIS — M54.6 ACUTE RIGHT-SIDED THORACIC BACK PAIN: Primary | ICD-10-CM

## 2021-09-21 PROCEDURE — 72070 X-RAY EXAM THORAC SPINE 2VWS: CPT | Performed by: RADIOLOGY

## 2021-09-21 PROCEDURE — 97140 MANUAL THERAPY 1/> REGIONS: CPT | Mod: GP | Performed by: PHYSICAL THERAPIST

## 2021-09-21 PROCEDURE — 97035 APP MDLTY 1+ULTRASOUND EA 15: CPT | Mod: GP | Performed by: PHYSICAL THERAPIST

## 2021-09-21 PROCEDURE — 97110 THERAPEUTIC EXERCISES: CPT | Mod: GP | Performed by: PHYSICAL THERAPIST

## 2021-09-21 PROCEDURE — 99213 OFFICE O/P EST LOW 20 MIN: CPT | Performed by: FAMILY MEDICINE

## 2021-09-21 ASSESSMENT — PAIN SCALES - GENERAL: PAINLEVEL: MODERATE PAIN (4)

## 2021-09-21 NOTE — LETTER
REPORT OF WORK COMP    [address]  [facility] [place] [address]  [phone]      PATIENT DATA    Employee Name: Hannah Krishna      : 1970     #: [id number]    Work related injury: Yes  Employer at time of injury: [employer]  Employer contact & phone:   Employed elsewhere? No  Workers' Compensation Carrier/Managed Care Plan:      Today's date: 2021   Date of injury: 21  Date of first visit: 21    PROVIDER EVALUATION: Please fill in as needed.  Please give copy to employee for employer.    1. Diagnosis: acute thoracic back sprain    2. Treatment: Physical Therapy   3. Medication: Flexeril and ibuprofen  NOTE: When ordering a medication, [place] Rules [place] or [place] on prescriptions.    4. No work N/A  5. Return to work date: 2021    ** WITH RESTRICTIONS? Yes, with work restrictions: * Restricted lifting - Maximum lift in pounds: 20  * Restricted bending and twisting--can do sometimes      RESTRICTIONS: Unlimited unless listed.  Restrictions apply to home and leisure also.  If work restrictions is not available, the employee is totally disabled.    Maximum Medical Improvement (Date):   Any Permanent Partial Disability? Deferred to future exam/consult.    Provider comments:  Not improving, but not worsening, will refer to Physical Therapy, continue same work restrictions    Medical Examiner: Merlene Paz MD           License or registration: [id number]    Next appointment: 1 month    CC: Employer, Managed Care Plan/Payor, Patient

## 2021-10-05 ENCOUNTER — THERAPY VISIT (OUTPATIENT)
Dept: PHYSICAL THERAPY | Facility: CLINIC | Age: 51
End: 2021-10-05
Payer: OTHER MISCELLANEOUS

## 2021-10-05 ENCOUNTER — HOSPITAL ENCOUNTER (OUTPATIENT)
Dept: MRI IMAGING | Facility: CLINIC | Age: 51
Discharge: HOME | End: 2021-10-05
Attending: FAMILY MEDICINE | Admitting: FAMILY MEDICINE
Payer: COMMERCIAL

## 2021-10-05 DIAGNOSIS — M54.6 ACUTE RIGHT-SIDED THORACIC BACK PAIN: ICD-10-CM

## 2021-10-05 DIAGNOSIS — G89.29 CHRONIC RIGHT-SIDED THORACIC BACK PAIN: Primary | ICD-10-CM

## 2021-10-05 DIAGNOSIS — M54.6 CHRONIC RIGHT-SIDED THORACIC BACK PAIN: Primary | ICD-10-CM

## 2021-10-05 PROCEDURE — 97035 APP MDLTY 1+ULTRASOUND EA 15: CPT | Mod: GP | Performed by: PHYSICAL THERAPIST

## 2021-10-05 PROCEDURE — 72146 MRI CHEST SPINE W/O DYE: CPT

## 2021-10-06 ENCOUNTER — TELEPHONE (OUTPATIENT)
Dept: FAMILY MEDICINE | Facility: OTHER | Age: 51
End: 2021-10-06

## 2021-10-06 DIAGNOSIS — G89.29 CHRONIC RIGHT-SIDED THORACIC BACK PAIN: Primary | ICD-10-CM

## 2021-10-06 DIAGNOSIS — M51.34 DDD (DEGENERATIVE DISC DISEASE), THORACIC: ICD-10-CM

## 2021-10-06 DIAGNOSIS — M54.6 CHRONIC RIGHT-SIDED THORACIC BACK PAIN: Primary | ICD-10-CM

## 2021-10-06 NOTE — TELEPHONE ENCOUNTER
TC patient. Please call and notify patient that her MRI shows multiple levels or degenerative disc disease and some disc bulging in her thoracic spine, unchanged since last year. Would recommend she try injections again with Dr. Gibbons so will place a new order. TC may have to addend her last note to allow for pre-op.    Jakub Lam PA-C

## 2021-10-06 NOTE — TELEPHONE ENCOUNTER
Spoke to patient, she is due for an appointment around 10/19. She would like to have hours decreased at work, she states it is really hurting. She will play it by ear and if she has to come in and see another provider we can. No availability to be seen by TC. Can we work in or use Chart Review spot or virtual to be seen at the end of October.     Corie Tripp MA

## 2021-10-07 NOTE — TELEPHONE ENCOUNTER
Patient informed.   She scheduled the next available apt to see CD[employer] for work restriction update. And scheduled to see TC just to be on the books for now.     Gave phone numbers to schedule for injection in case she doesn't hear from them.    Next 5 appointments (look out 90 days)    Oct 14, 2021  9:30 AM  Office Visit with Ana Paula Pickard PA-C  [facility] ([facility] ) [address] [place] [address]  [phone]   Nov 16, 2021  9:40 AM  Office Visit with Merlene Paz MD  [facility] ([facility] ) [address] [place] [id number]  [phone]        Corie Tripp MA

## 2021-10-11 ENCOUNTER — TELEPHONE (OUTPATIENT)
Dept: PALLIATIVE MEDICINE | Facility: CLINIC | Age: 51
End: 2021-10-11

## 2021-10-12 ENCOUNTER — THERAPY VISIT (OUTPATIENT)
Dept: PHYSICAL THERAPY | Facility: CLINIC | Age: 51
End: 2021-10-12
Payer: COMMERCIAL

## 2021-10-12 DIAGNOSIS — M54.6 RIGHT-SIDED THORACIC BACK PAIN: ICD-10-CM

## 2021-10-12 DIAGNOSIS — M54.6 ACUTE RIGHT-SIDED THORACIC BACK PAIN: Primary | ICD-10-CM

## 2021-10-12 PROCEDURE — 97110 THERAPEUTIC EXERCISES: CPT | Mod: GP | Performed by: PHYSICAL THERAPIST

## 2021-10-12 PROCEDURE — 97035 APP MDLTY 1+ULTRASOUND EA 15: CPT | Mod: GP | Performed by: PHYSICAL THERAPIST

## 2021-10-14 ENCOUNTER — OFFICE VISIT (OUTPATIENT)
Dept: FAMILY MEDICINE | Facility: OTHER | Age: 51
End: 2021-10-14
Payer: OTHER MISCELLANEOUS

## 2021-10-14 VITALS
BODY MASS INDEX: 36.5 KG/M2 | DIASTOLIC BLOOD PRESSURE: 74 MMHG | RESPIRATION RATE: 16 BRPM | OXYGEN SATURATION: 99 % | WEIGHT: 213.8 LBS | HEART RATE: 63 BPM | TEMPERATURE: 97.1 F | HEIGHT: 64 IN | SYSTOLIC BLOOD PRESSURE: 110 MMHG

## 2021-10-14 DIAGNOSIS — Z11.59 ENCOUNTER FOR SCREENING FOR OTHER VIRAL DISEASES: ICD-10-CM

## 2021-10-14 DIAGNOSIS — Z02.6 ENCOUNTER RELATED TO WORKER'S COMPENSATION CLAIM: ICD-10-CM

## 2021-10-14 DIAGNOSIS — M54.6 ACUTE RIGHT-SIDED THORACIC BACK PAIN: Primary | ICD-10-CM

## 2021-10-14 PROCEDURE — 99213 OFFICE O/P EST LOW 20 MIN: CPT | Performed by: PHYSICIAN ASSISTANT

## 2021-10-14 ASSESSMENT — MIFFLIN-ST. JEOR: SCORE: 1574.79

## 2021-10-14 ASSESSMENT — PAIN SCALES - GENERAL: PAINLEVEL: MODERATE PAIN (5)

## 2021-10-14 NOTE — LETTER
REPORT OF WORK COMP     PATIENT DATA     Employee Name: Hannah Krishna      : 1970     #: [id number]     Work related injury: Yes  Employer at time of injury: [employer]  Employer contact & phone:   Employed elsewhere? No  Workers' Compensation Carrier/Managed Care Plan:       Today's date: 2021  Date of injury: 21  Date of first visit: 21     PROVIDER EVALUATION: Please fill in as needed.  Please give copy to employee for employer.    1. Diagnosis: acute thoracic back sprain  2. Treatment: Physical Therapy, will be scheduling injections   3. Medication: Flexeril and ibuprofen  NOTE: When ordering a medication, [place] prescriptions.  4. No work = N/A  5. Return to work date: 2021    ** WITH RESTRICTIONS? Yes, with work restrictions:   * Restricted lifting - Maximum lift in pounds: 10  * Restricted bending and twisting -- no bending   * Restricted - recommend patient just do dispersal of medications      RESTRICTIONS: Unlimited unless listed.  Restrictions apply to home and leisure also.  If work restrictions is not available, the employee is totally disabled.     Maximum Medical Improvement (Date): TBD   Any Permanent Partial Disability? Deferred to future exam/consult - scheduled for 21     Provider comments:  Not improving, but not worsening, physical therapy did not provide relief, had MRI, now will be having injections, NEW work restrictions as outlined above, restrictions in place until next visit on 21      Medical Examiner: Winston Javier-NEWTON Watkins  [facility]

## 2021-10-14 NOTE — TELEPHONE ENCOUNTER
Pt scheduled for KRIS  Date: 10/28/21  Time: 230p  Dr. Gibbons    Instructed pt to have H&P and  for procedure.  Patient informed of COVID testing process.

## 2021-10-14 NOTE — H&P (VIEW-ONLY)
Assessment & Plan     ICD-10-CM    1. Acute right-sided thoracic back pain  M54.6    2. Encounter related to worker's compensation claim  Z02.6      - Patient with work injury and here for continued follow up  - She did not improve with physical therapy, so had MRI   - Reviewed these images with patient   - As other provider, recommend injections   - Patient will schedule these   - Discussed work and restrictions at length      See letters for new work restrictions   - Continue to follow monthly until improved       Review of the result(s) of each unique test - See list       9/21/21 - XR thoracic spine       10/5/21 - MRI thoracic spine   Diagnosis or treatment significantly limited by social determinants of health - None     25 minutes spent on the date of the encounter doing chart review, history and exam, documentation and further activities as noted above    The patient indicates understanding of these issues and agrees with the plan.    Return in about 1 month (around 11/14/2021).    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      Objective    /74   Pulse 63   " Temp 97.1  F (36.2  C) (Temporal)   Resp 16   Ht 1.626 m (5' 4\")   Wt 97 kg (213 lb 12.8 oz)   SpO2 99%   BMI 36.70 kg/m    Body mass index is 36.7 kg/m .  Physical Exam   GENERAL APPEARANCE: healthy, alert and no distress  EYES: Eyes grossly normal to inspection, PERRLA, conjunctivae and sclerae without injection or discharge, EOM intact   RESP: Lungs clear to auscultation - no rales, rhonchi or wheezes    CV: Regular rates and rhythm, normal S1 S2, no S3 or S4, no murmur, click or rub, no peripheral edema and peripheral pulses strong and symmetric bilaterally   MS:   Back : Deferred due to pain   No musculoskeletal defects are noted and gait is age appropriate without ataxia   SKIN: No suspicious lesions or rashes, hydration status appears adeuqate with normal skin turgor   PSYCH: Alert and oriented x3; speech- coherent , normal rate and volume; able to articulate logical thoughts, able to abstract reason, no tangential thoughts, no hallucinations or delusions, mentation appears normal, Mood is euthymic. Affect is appropriate for this mood state and bright. Thought content is free of suicidal ideation, hallucinations, and delusions. Dress is adequate and upkept. Eye contact is good during conversation.       Diagnostics: Reviewed in Epic       "

## 2021-10-14 NOTE — PROGRESS NOTES
Assessment & Plan     ICD-10-CM    1. Acute right-sided thoracic back pain  M54.6    2. Encounter related to worker's compensation claim  Z02.6      - Patient with work injury and here for continued follow up  - She did not improve with physical therapy, so had MRI   - Reviewed these images with patient   - As other provider, recommend injections   - Patient will schedule these   - Discussed work and restrictions at length      See letters for new work restrictions   - Continue to follow monthly until improved       Review of the result(s) of each unique test - See list       9/21/21 - XR thoracic spine       10/5/21 - MRI thoracic spine   Diagnosis or treatment significantly limited by social determinants of health - None     25 minutes spent on the date of the encounter doing chart review, history and exam, documentation and further activities as noted above    The patient indicates understanding of these issues and agrees with the plan.    Return in about 1 month (around 11/14/2021).    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      Objective    /74   Pulse 63   " Temp 97.1  F (36.2  C) (Temporal)   Resp 16   Ht 1.626 m (5' 4\")   Wt 97 kg (213 lb 12.8 oz)   SpO2 99%   BMI 36.70 kg/m    Body mass index is 36.7 kg/m .  Physical Exam   GENERAL APPEARANCE: healthy, alert and no distress  EYES: Eyes grossly normal to inspection, PERRLA, conjunctivae and sclerae without injection or discharge, EOM intact   RESP: Lungs clear to auscultation - no rales, rhonchi or wheezes    CV: Regular rates and rhythm, normal S1 S2, no S3 or S4, no murmur, click or rub, no peripheral edema and peripheral pulses strong and symmetric bilaterally   MS:   Back : Deferred due to pain   No musculoskeletal defects are noted and gait is age appropriate without ataxia   SKIN: No suspicious lesions or rashes, hydration status appears adeuqate with normal skin turgor   PSYCH: Alert and oriented x3; speech- coherent , normal rate and volume; able to articulate logical thoughts, able to abstract reason, no tangential thoughts, no hallucinations or delusions, mentation appears normal, Mood is euthymic. Affect is appropriate for this mood state and bright. Thought content is free of suicidal ideation, hallucinations, and delusions. Dress is adequate and upkept. Eye contact is good during conversation.       Diagnostics: Reviewed in Epic       "

## 2021-10-23 ENCOUNTER — HEALTH MAINTENANCE LETTER (OUTPATIENT)
Age: 51
End: 2021-10-23

## 2021-10-26 ENCOUNTER — LAB (OUTPATIENT)
Dept: LAB | Facility: CLINIC | Age: 51
End: 2021-10-26
Attending: ANESTHESIOLOGY
Payer: OTHER MISCELLANEOUS

## 2021-10-26 DIAGNOSIS — Z11.59 ENCOUNTER FOR SCREENING FOR OTHER VIRAL DISEASES: ICD-10-CM

## 2021-10-26 PROCEDURE — U0003 INFECTIOUS AGENT DETECTION BY NUCLEIC ACID (DNA OR RNA); SEVERE ACUTE RESPIRATORY SYNDROME CORONAVIRUS 2 (SARS-COV-2) (CORONAVIRUS DISEASE [COVID-19]), AMPLIFIED PROBE TECHNIQUE, MAKING USE OF HIGH THROUGHPUT TECHNOLOGIES AS DESCRIBED BY CMS-2020-01-R: HCPCS

## 2021-10-26 PROCEDURE — U0005 INFEC AGEN DETEC AMPLI PROBE: HCPCS

## 2021-10-27 LAB — SARS-COV-2 RNA RESP QL NAA+PROBE: NEGATIVE

## 2021-10-28 ENCOUNTER — HOSPITAL ENCOUNTER (OUTPATIENT)
Facility: CLINIC | Age: 51
Discharge: HOME | End: 2021-10-28
Attending: ANESTHESIOLOGY | Admitting: ANESTHESIOLOGY
Payer: OTHER MISCELLANEOUS

## 2021-10-28 ENCOUNTER — HOSPITAL ENCOUNTER (OUTPATIENT)
Dept: GENERAL RADIOLOGY | Facility: CLINIC | Age: 51
End: 2021-10-28
Attending: ANESTHESIOLOGY | Admitting: ANESTHESIOLOGY
Payer: OTHER MISCELLANEOUS

## 2021-10-28 ENCOUNTER — ANESTHESIA (OUTPATIENT)
Dept: SURGERY | Facility: CLINIC | Age: 51
End: 2021-10-28
Payer: OTHER MISCELLANEOUS

## 2021-10-28 ENCOUNTER — ANESTHESIA EVENT (OUTPATIENT)
Dept: SURGERY | Facility: CLINIC | Age: 51
End: 2021-10-28
Payer: OTHER MISCELLANEOUS

## 2021-10-28 VITALS
RESPIRATION RATE: 16 BRPM | DIASTOLIC BLOOD PRESSURE: 72 MMHG | OXYGEN SATURATION: 96 % | HEART RATE: 58 BPM | SYSTOLIC BLOOD PRESSURE: 112 MMHG

## 2021-10-28 DIAGNOSIS — G89.29 CHRONIC RIGHT-SIDED THORACIC BACK PAIN: ICD-10-CM

## 2021-10-28 DIAGNOSIS — M54.6 CHRONIC RIGHT-SIDED THORACIC BACK PAIN: ICD-10-CM

## 2021-10-28 PROCEDURE — 250N000011 HC RX IP 250 OP 636: Performed by: NURSE ANESTHETIST, CERTIFIED REGISTERED

## 2021-10-28 PROCEDURE — 62321 NJX INTERLAMINAR CRV/THRC: CPT | Performed by: ANESTHESIOLOGY

## 2021-10-28 PROCEDURE — 250N000011 HC RX IP 250 OP 636: Performed by: ANESTHESIOLOGY

## 2021-10-28 PROCEDURE — 370N000017 HC ANESTHESIA TECHNICAL FEE, PER MIN: Performed by: ANESTHESIOLOGY

## 2021-10-28 PROCEDURE — 999N000179 XR SURGERY CARM FLUORO LESS THAN 5 MIN W STILLS: Mod: TC

## 2021-10-28 PROCEDURE — 250N000009 HC RX 250: Performed by: NURSE ANESTHETIST, CERTIFIED REGISTERED

## 2021-10-28 RX ORDER — LIDOCAINE 40 MG/G
CREAM TOPICAL
Status: DISCONTINUED | OUTPATIENT
Start: 2021-10-28 | End: 2021-10-28 | Stop reason: HOSPADM

## 2021-10-28 RX ORDER — TRIAMCINOLONE ACETONIDE 40 MG/ML
INJECTION, SUSPENSION INTRA-ARTICULAR; INTRAMUSCULAR PRN
Status: DISCONTINUED | OUTPATIENT
Start: 2021-10-28 | End: 2021-10-28 | Stop reason: HOSPADM

## 2021-10-28 RX ORDER — SODIUM CHLORIDE, SODIUM LACTATE, POTASSIUM CHLORIDE, CALCIUM CHLORIDE 600; 310; 30; 20 MG/100ML; MG/100ML; MG/100ML; MG/100ML
INJECTION, SOLUTION INTRAVENOUS CONTINUOUS
Status: DISCONTINUED | OUTPATIENT
Start: 2021-10-28 | End: 2021-10-28 | Stop reason: HOSPADM

## 2021-10-28 RX ORDER — IOPAMIDOL 612 MG/ML
INJECTION, SOLUTION INTRATHECAL PRN
Status: DISCONTINUED | OUTPATIENT
Start: 2021-10-28 | End: 2021-10-28 | Stop reason: HOSPADM

## 2021-10-28 RX ORDER — FENTANYL CITRATE 50 UG/ML
INJECTION, SOLUTION INTRAMUSCULAR; INTRAVENOUS PRN
Status: DISCONTINUED | OUTPATIENT
Start: 2021-10-28 | End: 2021-10-28

## 2021-10-28 RX ADMIN — LIDOCAINE HYDROCHLORIDE 1 ML: 10 INJECTION, SOLUTION EPIDURAL; INFILTRATION; INTRACAUDAL; PERINEURAL at 14:01

## 2021-10-28 RX ADMIN — MIDAZOLAM 1 MG: 1 INJECTION INTRAMUSCULAR; INTRAVENOUS at 14:05

## 2021-10-28 RX ADMIN — FENTANYL CITRATE 100 MCG: 50 INJECTION, SOLUTION INTRAMUSCULAR; INTRAVENOUS at 14:05

## 2021-10-28 ASSESSMENT — LIFESTYLE VARIABLES: TOBACCO_USE: 1

## 2021-10-28 NOTE — ANESTHESIA POSTPROCEDURE EVALUATION
Patient: Hannah Krishna    Procedure: Procedure(s):  Right thoracic 9-10 epidural injection       Diagnosis:Chronic right-sided thoracic back pain [M54.6, G89.29]  DDD (degenerative disc disease), lumbar [M51.36]  Diagnosis Additional Information: No value filed.    Anesthesia Type:  MAC    Note:  Disposition: Outpatient   Postop Pain Control: Uneventful            Sign Out: Well controlled pain   PONV: No   Neuro/Psych: Uneventful            Sign Out: Acceptable/Baseline neuro status   Airway/Respiratory: Uneventful            Sign Out: Acceptable/Baseline resp. status   CV/Hemodynamics: Uneventful            Sign Out: Acceptable CV status   Other NRE: NONE   DID A NON-ROUTINE EVENT OCCUR? No    Event details/Postop Comments:  Pt was happy with anesthesia care.  No complications.  I will follow up with the pt if needed.           Last vitals:  Vitals Value Taken Time   BP     Temp     Pulse     Resp     SpO2         Electronically Signed By: VEENA Chen CRNA  October 28, 2021  2:20 PM

## 2021-10-28 NOTE — ANESTHESIA PREPROCEDURE EVALUATION
Anesthesia Pre-Procedure Evaluation    Patient: Hannah rKishna   MRN: [id number] : 1970        Preoperative Diagnosis: Chronic right-sided thoracic back pain [M54.6, G89.29]  DDD (degenerative disc disease), lumbar [M51.36]    Procedure : Procedure(s):  INJECTION, SPINE, THORACIC, EPIDURAL (levels per Dr. devi)          Past Medical History:   Diagnosis Date     Chest pain, unspecified 2005    atypical - stress echo neg     Chronic depressive personality disorder      Esophageal reflux 2013     Headache 2013     Hypertension      Lateral epicondylitis 2015     Malaise and fatigue 2005     Obesity, unspecified      Other malaise and fatigue 2005     Polycystic ovaries     per pt     Sleep disturbance, unspecified 10/2005    see sleep study     Unspecified disorder of thyroid 2005    thyroid nodule. I-123 WNL.      Past Surgical History:   Procedure Laterality Date     ARTHROSCOPY SHOULDER BICEPS TENODESIS REPAIR Right 2016    Procedure: ARTHROSCOPY SHOULDER BICEPS TENODESIS REPAIR;  Surgeon: Gilbert Cortes DO;  Location: [facility]     ARTHROSCOPY SHOULDER DECOMPRESSION Right 2016    Procedure: ARTHROSCOPY SHOULDER DECOMPRESSION;  Surgeon: Gilbert Cortes DO;  [facility]     ARTHROSCOPY SHOULDER DISTAL CLAVICLE REPAIR Right 2016    Procedure: ARTHROSCOPY SHOULDER DISTAL CLAVICLE RESECTION;  Surgeon: Gilbert Cortes DO;  [facility]     ARTHROSCOPY SHOULDER, OPEN ROTATOR CUFF REPAIR, COMBINED Right 2016    Procedure: COMBINED ARTHROSCOPY SHOULDER, OPEN ROTATOR CUFF REPAIR;  Surgeon: Gilbert Cortes DO;  Location[facility]     BUNIONECTOMY  10/24/2013    Procedure: BUNIONECTOMY;;  Surgeon: Ariel Mcdermott DPM;  Location[facility]     C LIGATE FALLOPIAN TUBE,POSTPARTUM      Tubal Ligation     C STRESS ECHO TEST NL  2005    neg (low probability of ischemic disease)     EXCISE EXOSTOSIS FOOT  10/24/2013    Procedure:  EXCISE EXOSTOSIS FOOT;  left excision of accessory navicular, bunion repair with osteotomy, and navicular remodeling;  Surgeon: Ariel Mcdermott DPM;  Location: [facility]     EYE SURGERY       HC RAD RX IODINE 123 SODIUM IODIDE  UCI, UP  UCI  10/2005    I 123 thryoid -WNL     HEAD & NECK SURGERY      Why is this one highlighted on yes already??     INJECT BLOCK MEDIAL BRANCH CERVICAL/THORACIC/LUMBAR Right 2020    Procedure: 10,11,12 Medial Branch Block Thoracic and Lumbar-1, Right;  Surgeon: González Gibbons MD;  [facility]     INJECT BLOCK MEDIAL BRANCH CERVICAL/THORACIC/LUMBAR Right 2020    Procedure: Right thoracic 10,11,12 and lumbar 1 medial branch blocks;  Surgeon: González Gibbons MD;  [facility]     INJECT TRIGGER POINT N/A 10/30/2020    Procedure: thoracic epidural injection T8-9 and trigger point injections thoracic;  Surgeon: González Gibbons MD;  [facility]     OSTEOTOMY FOOT  10/24/2013    Procedure: OSTEOTOMY FOOT;;  Surgeon: Ariel Mcdermott DPM;  [facility]     RADIO FREQUENCY ABLATION PULSED CERVICAL N/A 2021    Procedure: radiofrequency ablation thoracic 10, 11, 12 and lumabr 1 medial branches;  Surgeon: González Gibbons MD;  [facility]     Z NONSPECIFIC PROCEDURE      kidney stones removed     Z NONSPECIFIC PROCEDURE  [id number]    lasik surgery      Allergies   Allergen Reactions     No Known Drug Allergies       Social History     Tobacco Use     Smoking status: Former Smoker     Packs/day: 0.10     Years: 18.00     Pack years: 1.80     Types: Cigarettes     Quit date: 2014     Years since quittin.9     Smokeless tobacco: Never Used     Tobacco comment: since    Substance Use Topics     Alcohol use: Yes     Comment: Occasional      Wt Readings from Last 1 Encounters:   10/14/21 97 kg (213 lb 12.8 oz)        Anesthesia Evaluation   Pt has had prior anesthetic. Type: General.    No history of anesthetic complications        ROS/MED HX  ENT/Pulmonary:     (+) tobacco use, Past use, 2  Pack-Year Hx,      Neurologic:  - neg neurologic ROS     Cardiovascular:     (+) Dyslipidemia hypertension-range: < 140 / 90/ ----    METS/Exercise Tolerance:     Hematologic:  - neg hematologic  ROS     Musculoskeletal:  - neg musculoskeletal ROS     GI/Hepatic:     (+) GERD,     Renal/Genitourinary:       Endo:     (+) thyroid problem, Obesity,     Psychiatric/Substance Use:     (+) psychiatric history anxiety and depression     Infectious Disease:  - neg infectious disease ROS     Malignancy:  - neg malignancy ROS     Other:            Physical Exam    Airway      Comment: Fusion of spine of cervical region    Mallampati: II   TM distance: > 3 FB   Neck ROM: full   Mouth opening: > 3 cm    Respiratory Devices and Support         Dental  no notable dental history         Cardiovascular   cardiovascular exam normal          Pulmonary   pulmonary exam normal                OUTSIDE LABS:  CBC:   Lab Results   Component Value Date    WBC 6.6 01/26/2021    WBC 5.5 08/20/2019    HGB 12.5 01/26/2021    HGB 12.0 08/20/2019    HCT 37.5 01/26/2021    HCT 36.5 08/20/2019     01/26/2021     08/20/2019     BMP:   Lab Results   Component Value Date     01/26/2021     07/08/2016    POTASSIUM 3.8 01/26/2021    POTASSIUM 4.4 07/08/2016    CHLORIDE 107 01/26/2021    CHLORIDE 108 07/08/2016    CO2 27 01/26/2021    CO2 22 07/08/2016    BUN 22 01/26/2021    BUN 9 07/08/2016    CR 0.98 01/26/2021    CR 0.86 07/08/2016    GLC 78 01/26/2021    GLC 83 02/07/2020     COAGS: No results found for: PTT, INR, FIBR  POC:   Lab Results   Component Value Date    HCG Negative 04/08/2016     HEPATIC:   Lab Results   Component Value Date    ALBUMIN 3.5 07/08/2016    PROTTOTAL 7.2 07/08/2016    ALT 27 07/08/2016    AST 17 07/08/2016    GGT 29 12/20/2005    ALKPHOS 93 07/08/2016    BILITOTAL 0.6 07/08/2016    BILIDIRECT 0.1 10/09/2002     OTHER:   Lab Results    Component Value Date    PH 7.0 04/11/2001    A1C 5.2 12/13/2005    TORREY 9.2 01/26/2021    LIPASE 38 08/31/2011    AMYLASE 55 08/31/2011    TSH 1.48 01/26/2021    T4 0.84 10/19/2005    SED 11 07/17/2013       Anesthesia Plan    ASA Status:  2   NPO Status:  NPO Appropriate    Anesthesia Type: MAC.     - Reason for MAC: Deep or markedly invasive procedure (G8)   Induction: Intravenous, Propofol.   Maintenance: TIVA.        Consents    Anesthesia Plan(s) and associated risks, benefits, and realistic alternatives discussed. Questions answered and patient/representative(s) expressed understanding.     - Discussed with:  Patient      - Extended Intubation/Ventilatory Support Discussed: No.      - Patient is DNR/DNI Status: No    Use of blood products discussed: No .     Postoperative Care            Comments:                VEENA Chen CRNA

## 2021-10-28 NOTE — ANESTHESIA CARE TRANSFER NOTE
Patient: Hannah Krishna    Procedure: Procedure(s):  Right thoracic 9-10 epidural injection       Diagnosis: Chronic right-sided thoracic back pain [M54.6, G89.29]  DDD (degenerative disc disease), lumbar [M51.36]  Diagnosis Additional Information: No value filed.    Anesthesia Type:   MAC     Note:    Oropharynx: spontaneously breathing  Level of Consciousness: awake  Oxygen Supplementation: room air    Independent Airway: airway patency satisfactory and stable  Dentition: dentition unchanged  Vital Signs Stable: post-procedure vital signs reviewed and stable  Report to RN Given: handoff report given  Patient transferred to: Phase II    Handoff Report: Identifed the Patient, Identified the Reponsible Provider, Reviewed the pertinent medical history, Discussed the surgical course, Reviewed Intra-OP anesthesia mangement and issues during anesthesia, Set expectations for post-procedure period and Allowed opportunity for questions and acknowledgement of understanding      Vitals:  Vitals Value Taken Time   BP     Temp     Pulse     Resp     SpO2         Electronically Signed By: VEENA Chen CRNA  October 28, 2021  2:19 PM

## 2021-10-28 NOTE — OP NOTE
CHIEF COMPLAINT: Right-sided thoracic back pain with a radicular component due to T9-10 foraminal stenosis  PROCEDURE: Right paramedian T9-10 interlaminar epidural steroid injection using fluoroscopic guidance with contrast dye.   PROCEDURE DETAILS: After written informed consent was obtained from the patient, the patient was escorted to the procedure room.  The patient was placed in the prone position.  A  time out  was conducted to verify patient identity, procedure to be performed, side, site, allergies and any special requirements.  The skin over the neck and upper back region were prepped and draped in normal sterile fashion. Fluoroscopy was used to identify the C6-7 interspace in an AP view and the skin was anesthetized with 2 mL of 1% lidocaine with bicarbonate buffer.  A 20-gauge 3-1/2 inch Tuohy needle was advanced using the loss of resistance technique with preservative free normal saline with fluoroscopic guidance. After negative aspiration for CSF and blood, 1.5 cc of Isovue contrast dye was injected revealing, Manju, posterior spread into the posterior tissues however the needle was advanced more anteriorly until I had a proper LASIK epidurogram without evidence of intrathecal or intravascular spread. Following this, a 3-mL solution of 40 mg of triamcinolone with 2 cc preservative-free normal saline was slowly injected.  After injection of the medication, as the needle tip was withdrawn, it was flushed with local anesthetic.  The patient was monitored with blood pressure and pulse oximetry machines with the assistance of an RN throughout the procedure.  The patient was alert and responsive to questions throughout the procedure.   The patient tolerated the procedure well and was observed in the post-procedural area.  The patient was dismissed without apparent complications.     BLOOD LOSS: less than 5 cc    DIAGNOSIS:  1.  Right-sided thoracic back pain with a radicular component due to T9-10 foraminal  stenosis  PLAN:  1. Performed a T9-10 interlaminar epidural steroid injection.   2. The patient was instructed to call the [facility] if today's procedure is not helpful.  If this is not helpful then I would recommend a diagnostic and therapeutic right T9 nerve root block.  If his diagnostic nerve root block is positive with at least 2 hours of pain relief in the 80 to 100% range then I would refer her to neurosurgery for possible decompression.    González Gibbons MD  Diplomate of the American Board of Anesthesiology, Pain Medicine

## 2021-10-28 NOTE — DISCHARGE INSTRUCTIONS

## 2021-11-16 ENCOUNTER — OFFICE VISIT (OUTPATIENT)
Dept: FAMILY MEDICINE | Facility: OTHER | Age: 51
End: 2021-11-16
Payer: OTHER MISCELLANEOUS

## 2021-11-16 ENCOUNTER — TELEPHONE (OUTPATIENT)
Dept: FAMILY MEDICINE | Facility: OTHER | Age: 51
End: 2021-11-16
Payer: COMMERCIAL

## 2021-11-16 VITALS
BODY MASS INDEX: 36.56 KG/M2 | DIASTOLIC BLOOD PRESSURE: 58 MMHG | TEMPERATURE: 96.6 F | OXYGEN SATURATION: 98 % | SYSTOLIC BLOOD PRESSURE: 108 MMHG | HEART RATE: 68 BPM | WEIGHT: 213 LBS

## 2021-11-16 DIAGNOSIS — G89.29 CHRONIC RIGHT-SIDED THORACIC BACK PAIN: Primary | ICD-10-CM

## 2021-11-16 DIAGNOSIS — M54.6 CHRONIC RIGHT-SIDED THORACIC BACK PAIN: Primary | ICD-10-CM

## 2021-11-16 PROCEDURE — 99213 OFFICE O/P EST LOW 20 MIN: CPT | Performed by: FAMILY MEDICINE

## 2021-11-16 ASSESSMENT — PAIN SCALES - GENERAL: PAINLEVEL: MODERATE PAIN (5)

## 2021-11-16 NOTE — PROGRESS NOTES
Assessment & Plan     Chronic right-sided thoracic back pain  Patient's back pain has not improved after physical therapy and steroid injection.  Dr. Gibbons had recommended that if she did not improve with a steroid injection to have a diagnostic and therapeutic right T9 nerve root block.  If this block is positive with at least 2 hours of pain relief in the 80 to 100% range then would consider possible decompression surgery.  As I have nothing more to offer her I recommend she have this diagnostic injection and then see neurosurgery whether or not it is positive.  Discussed that if there are no other interventions she may need to see pain clinic and occupational medicine to assess her workability.  We will not change her restrictions as she is symptomatic with mild bending of her back.  - Spine Referral; Future    Merlene Paz MD  [facility] KIKE Young is a 51 year old who presents for the following health issues     HPI     Work Comp follow up for Mid Back Pain    Back Pain  Description:   Location of pain: middle of back both  Character of pain: dull ache  Pain radiation: none  New numbness or weakness in legs, not attributed to pain: no   Intensity: Currently 5/10  Progression of Symptoms: same  History:   Specific cause: lifting  Pain interferes with job: YES  History of back problems: no prior back problems  Any previous MRI or X-rays: Yes- at [place].   Sees a specialist for back pain: No  Alleviating factors:   Improved by: steroid injection, pt  Precipitating factors:  Worsened by: unknown- not doing any of the things   Therapies tried and outcome:  cold, heat, muscle relaxants, Physical Therapy and steroid injection    Patient last saw a colleague of mine for her work comp back pain.  She was referred for steroid injection.  She had this done a little over 2 weeks ago without improvement.  She still complains of mid right sided back pain which radiates around  her chest under her breast.  She denies numbness or tingling.  She denies any skin changes.  She does admit to having pain with bending forward.  She currently is on work restrictions of no lifting more than 10 pounds and no bending.    In review of her chart when she had her paramedian T9-10 intralaminar epidural steroid injection the performing provider recommended that if this was not helpful to have a diagnostic and therapeutic right T9 nerve root block.  If it is positive with at least 2 hours of pain relief would then consider neurosurgery referral for decompression.        Objective    /58   Pulse 68   Temp (!) 96.6  F (35.9  C) (Temporal)   Wt 96.6 kg (213 lb)   SpO2 98%   BMI 36.56 kg/m    Body mass index is 36.56 kg/m .  Physical Exam   Gen: no apparent distress  Back: Patient indicates pain in her upper mid right back although it is not reproducible to palpation.  She has difficulty with any bending of her upper back.  No problems with lower back flexion.  She is able to twist without difficulty.  No skin disruption.  Sensation grossly intact.    MRI reviewed from 10/5/21:  T1-T2: Normal disc. Mild facet arthropathy. Patent central canal and foramen.     T2-T3: Disc desiccation and mild posterolateral disc bulge. Facet arthropathy. Patent central canal. Mild bilateral foraminal stenosis.     T3-T4: Disc desiccation. Left facet arthropathy with mild to moderate left foraminal stenosis. Patent central canal and right foramen.     T4-T5: Disc desiccation and small, broad-based disc protrusion. Patent central canal and foramen.     T5-T6: Disc desiccation and height loss. Mild right eccentric disc bulge and right facet arthropathy with mild right foraminal stenosis. Patent central canal.     T6-T7: Small, broad-based left paracentral disc protrusion with mild canal stenosis and minimal cord contact. No cord signal change. Facet arthropathy with mild right foraminal stenosis. No change.     T7-T8:  Small, broad-based central disc osteophyte complex. Mild facet arthropathy. Mild central canal stenosis without cord contact or cord signal change. Patent foramina.     T8-T9: Shallow, broad-based disc protrusion indenting the ventral thecal sac. Minimal cord contouring without cord signal change. Mild canal stenosis. Right facet arthropathy with mild right foraminal stenosis.     T9-T10: Disc desiccation and mild posterior annular bulge. Moderate bilateral facet arthropathy with moderate bilateral foraminal stenosis. Patent central canal.     T10-T11: Bilateral facet arthropathy with moderate to severe bilateral foraminal stenosis. Mild disc bulge. Adequate central canal.     T11-T12: Disc desiccation and height loss. Mild disc bulge. Facet arthropathy with mild left and minimal right foraminal stenosis. Patent central canal.     T12-L1: Disc desiccation. Unremarkable facets. Patent central canal and foramen.

## 2021-11-16 NOTE — LETTER
REPORT OF WORK COMP     PATIENT DATA     Employee Name: Hannah Krishna      : 1970     #: [id number]     Work related injury: Yes  Employer at time of injury: [employer]  Employer contact & phone:   Employed elsewhere? No  Workers' Compensation Carrier/Managed Care Plan:       Today's date: 2021   Date of injury: 21  Date of first visit: 21     PROVIDER EVALUATION: Please fill in as needed.  Please give copy to employee for employer.    1. Diagnosis: chronic thoracic back sprain  2. Treatment: Physical Therapy, injection   3. Medication: Flexeril and ibuprofen  NOTE: When ordering a medication, [place] Rules require Work Comp or WC on prescriptions.  4. No work = N/A  5. Return to work date: 2021    ** WITH RESTRICTIONS? Yes, with work restrictions:   * Restricted lifting - Maximum lift in pounds: 10  * Restricted bending and twisting -- no bending   * Restricted - recommend patient just do dispersal of medications      RESTRICTIONS: Unlimited unless listed.  Restrictions apply to home and leisure also.  If work restrictions is not available, the employee is totally disabled.     Maximum Medical Improvement (Date): TBD   Any Permanent Partial Disability? Deferred to future exam/consult      Provider comments:  Injection not helpful, needs another diagnostic injection with subsequent referral to neurosurgery     Medical Examiner: Merlene Paz MD   [facility]

## 2021-11-18 ENCOUNTER — TELEPHONE (OUTPATIENT)
Dept: OTHER | Age: 51
End: 2021-11-18
Payer: COMMERCIAL

## 2021-11-18 NOTE — TELEPHONE ENCOUNTER
Hello,  Pt previously saw Dr. Gibbons and has now seen Dr. Paz.  Pt is seeking a diagnostic and therapeutic right T9 nerve root block that Dr. Gibbons recommended.  She needs a referral for this, can you help?  The current referral expires soon.  What locations can this be done at?

## 2021-11-23 ENCOUNTER — TELEPHONE (OUTPATIENT)
Dept: OTHER | Age: 51
End: 2021-11-23
Payer: COMMERCIAL

## 2021-11-23 DIAGNOSIS — G89.29 CHRONIC THORACIC BACK PAIN, UNSPECIFIED BACK PAIN LATERALITY: Primary | ICD-10-CM

## 2021-11-23 DIAGNOSIS — M54.6 CHRONIC THORACIC BACK PAIN, UNSPECIFIED BACK PAIN LATERALITY: Primary | ICD-10-CM

## 2021-11-23 NOTE — TELEPHONE ENCOUNTER
Hello,  This patient has an orthopedic  referral from Dr. Paz.  The referral as it is would result in an orthopedic spine appointment who could then refer the pt on.  Dr. Gibbons recommended diagnostic and therapeutic right T9 nerve root block. I am not familiar with which order should be used to order the nerve block directly if that is the intent.  Should the patient be scheduled with a spine specialist first?

## 2021-11-26 ENCOUNTER — TELEPHONE (OUTPATIENT)
Dept: PALLIATIVE MEDICINE | Facility: CLINIC | Age: 51
End: 2021-11-26
Payer: COMMERCIAL

## 2021-11-26 DIAGNOSIS — Z11.59 ENCOUNTER FOR SCREENING FOR OTHER VIRAL DISEASES: ICD-10-CM

## 2021-11-26 NOTE — TELEPHONE ENCOUNTER
Pt scheduled for Nerve root block   Date: 12-9-21  Time: 230p  Dr. Gibbons    Instructed pt to have H&P and  for procedure.  Patient informed of COVID testing process.

## 2021-12-04 DIAGNOSIS — M47.814 SPONDYLOSIS OF THORACIC REGION WITHOUT MYELOPATHY OR RADICULOPATHY: Primary | ICD-10-CM

## 2021-12-04 NOTE — TELEPHONE ENCOUNTER
I will put an order in for this. It is important to know that going forward all Medicaid and Medicare patients will not have IV sedation for injections. IT is the new policy. All other insurances will still cover MAC anesthesia for injections- just an FYI

## 2021-12-06 ENCOUNTER — LAB (OUTPATIENT)
Dept: LAB | Facility: OTHER | Age: 51
End: 2021-12-06
Attending: ANESTHESIOLOGY
Payer: COMMERCIAL

## 2021-12-06 DIAGNOSIS — Z11.59 ENCOUNTER FOR SCREENING FOR OTHER VIRAL DISEASES: ICD-10-CM

## 2021-12-06 PROCEDURE — U0003 INFECTIOUS AGENT DETECTION BY NUCLEIC ACID (DNA OR RNA); SEVERE ACUTE RESPIRATORY SYNDROME CORONAVIRUS 2 (SARS-COV-2) (CORONAVIRUS DISEASE [COVID-19]), AMPLIFIED PROBE TECHNIQUE, MAKING USE OF HIGH THROUGHPUT TECHNOLOGIES AS DESCRIBED BY CMS-2020-01-R: HCPCS

## 2021-12-06 PROCEDURE — U0005 INFEC AGEN DETEC AMPLI PROBE: HCPCS

## 2021-12-07 LAB — SARS-COV-2 RNA RESP QL NAA+PROBE: NEGATIVE

## 2021-12-07 NOTE — PROGRESS NOTES
Subjective:  HPI  Physical Exam                    Objective:  System    Physical Exam    General     ROS    Assessment/Plan:    DISCHARGE REPORT    Progress reporting period is from 8-27-21 to 10-12-21.       SUBJECTIVE  Subjective changes noted by patient:  Per SOAP note 10-12-21, Hannah reports that after MR results, injections are recommended. Has had them for low back in past. Hannah reports soreness mid-thoracic but denies pain that wraps around rib cage to front on right. Passing meds at work vs assisting with dressing. Bending at waist aggravates. US most helpful.     Current Pain level: 5/10.     Initial Pain level: 6/10.   Changes in function:  Yes (See Goal flowsheet attached for changes in current functional level)  Adverse reaction to treatment or activity: None    OBJECTIVE  Changes noted in objective findings:  Patient has failed to return to therapy so current objective findings are unknown.  Per SOAP note 10-12-21: Limited Thoracic Ext > 50%.  MR results show degenerative changes T6-8: osteophytes, foraminal stenosis, small broadbased L paracentral disc protrusion.     ASSESSMENT/PLAN  Updated problem list and treatment plan: Diagnosis 1:  Thoracic Pain  Pain -  US, manual therapy, self management, education and home program  Decreased ROM/flexibility - manual therapy, therapeutic exercise, therapeutic activity and home program  Decreased joint mobility - manual therapy, therapeutic exercise and home program  Decreased strength - therapeutic exercise, therapeutic activities and home program  Inflammation - US and self management/home program  Impaired muscle performance - neuro re-education and home program  Decreased function - therapeutic activities and home program  STG/LTGs have been met or progress has been made towards goals:  Yes (See Goal flow sheet completed today.)  Assessment of Progress: The patient's condition is improving.  The patient has not returned to therapy. Current status is  unknown.  Patient is meeting short term goals and is progressing towards long term goals.  Self Management Plans:  Patient is independent in a home treatment program.  Patient is independent in self management of symptoms.  I have re-evaluated this patient and find that the nature, scope, duration and intensity of the therapy is appropriate for the medical condition of the patient.  Hannah continues to require the following intervention to meet STG and LTG's:  PT intervention is no longer required to meet STG/LTG.    Recommendations:  This patient is ready to be discharged from therapy and continue their home treatment program.    Please refer to the daily flowsheet for treatment today, total treatment time and time spent performing 1:1 timed codes.

## 2021-12-09 ENCOUNTER — HOSPITAL ENCOUNTER (OUTPATIENT)
Facility: CLINIC | Age: 51
Discharge: HOME | End: 2021-12-09
Attending: ANESTHESIOLOGY | Admitting: ANESTHESIOLOGY
Payer: OTHER MISCELLANEOUS

## 2021-12-09 VITALS
TEMPERATURE: 98.3 F | DIASTOLIC BLOOD PRESSURE: 100 MMHG | HEART RATE: 55 BPM | SYSTOLIC BLOOD PRESSURE: 164 MMHG | RESPIRATION RATE: 17 BRPM | OXYGEN SATURATION: 100 %

## 2021-12-09 PROCEDURE — 999N000141 HC STATISTIC PRE-PROCEDURE NURSING ASSESSMENT: Performed by: ANESTHESIOLOGY

## 2021-12-09 NOTE — OR NURSING
Patient arrived in Same Day Surgery.   Patient was not currently experiencing pain.   Dr. Gibbons was notified that patient is not having pain today.   Due to the nature of the injection type, patient should be in pain to be able to assess effectiveness of injection.  Provider recommended patient take some time to ambulate and mimic actions to try to induce pain prior to injection.   Patient ambulated with  throughout the facility about 20 minutes. Patient was not able to induce pain.   Patient was cancelled and will reschedule when patient is experiencing pain.     Total time spent with patient 20 minutes.     Rosalina Corona, RN BSN

## 2022-01-10 ENCOUNTER — TELEPHONE (OUTPATIENT)
Dept: FAMILY MEDICINE | Facility: OTHER | Age: 52
End: 2022-01-10
Payer: COMMERCIAL

## 2022-01-10 DIAGNOSIS — Z11.59 ENCOUNTER FOR SCREENING FOR OTHER VIRAL DISEASES: Primary | ICD-10-CM

## 2022-01-10 NOTE — TELEPHONE ENCOUNTER
Reason for call:  Other   Patient called regarding (reason for call): appointment  Additional comments: PT needs a pre op exam before, 01/21/2022. No openings with primary or anyone else at the location she prefers. Please contact pt.     Phone number to reach patient: Cell number on file:    Telephone Information:   Mobile [phone]       Best Time:  Anytime after 3    Can we leave a detailed message on this number?  YES    Travel screening: Not Applicable

## 2022-01-11 NOTE — TELEPHONE ENCOUNTER
I do not have any availability after 3 prior to the 21st. however I be willing to see her at any of my same day or approval required openings.

## 2022-01-18 ENCOUNTER — OFFICE VISIT (OUTPATIENT)
Dept: FAMILY MEDICINE | Facility: OTHER | Age: 52
End: 2022-01-18
Payer: OTHER MISCELLANEOUS

## 2022-01-18 VITALS
SYSTOLIC BLOOD PRESSURE: 130 MMHG | TEMPERATURE: 97.3 F | RESPIRATION RATE: 20 BRPM | BODY MASS INDEX: 38.36 KG/M2 | OXYGEN SATURATION: 99 % | WEIGHT: 223.5 LBS | HEART RATE: 65 BPM | DIASTOLIC BLOOD PRESSURE: 80 MMHG

## 2022-01-18 DIAGNOSIS — F41.9 ANXIETY: ICD-10-CM

## 2022-01-18 DIAGNOSIS — Z01.818 PREOP GENERAL PHYSICAL EXAM: Primary | ICD-10-CM

## 2022-01-18 DIAGNOSIS — I10 BENIGN ESSENTIAL HYPERTENSION: ICD-10-CM

## 2022-01-18 DIAGNOSIS — G89.29 CHRONIC RIGHT-SIDED THORACIC BACK PAIN: ICD-10-CM

## 2022-01-18 DIAGNOSIS — Z11.59 ENCOUNTER FOR SCREENING FOR OTHER VIRAL DISEASES: ICD-10-CM

## 2022-01-18 DIAGNOSIS — M54.6 CHRONIC RIGHT-SIDED THORACIC BACK PAIN: ICD-10-CM

## 2022-01-18 DIAGNOSIS — E66.01 SEVERE OBESITY (BMI 35.0-39.9) WITH COMORBIDITY (H): ICD-10-CM

## 2022-01-18 DIAGNOSIS — F33.1 MAJOR DEPRESSIVE DISORDER, RECURRENT EPISODE, MODERATE (H): ICD-10-CM

## 2022-01-18 LAB — SARS-COV-2 RNA RESP QL NAA+PROBE: NEGATIVE

## 2022-01-18 PROCEDURE — 99214 OFFICE O/P EST MOD 30 MIN: CPT | Performed by: FAMILY MEDICINE

## 2022-01-18 PROCEDURE — U0005 INFEC AGEN DETEC AMPLI PROBE: HCPCS | Performed by: FAMILY MEDICINE

## 2022-01-18 PROCEDURE — U0003 INFECTIOUS AGENT DETECTION BY NUCLEIC ACID (DNA OR RNA); SEVERE ACUTE RESPIRATORY SYNDROME CORONAVIRUS 2 (SARS-COV-2) (CORONAVIRUS DISEASE [COVID-19]), AMPLIFIED PROBE TECHNIQUE, MAKING USE OF HIGH THROUGHPUT TECHNOLOGIES AS DESCRIBED BY CMS-2020-01-R: HCPCS | Performed by: FAMILY MEDICINE

## 2022-01-18 RX ORDER — HYDROXYZINE HYDROCHLORIDE 25 MG/1
25 TABLET, FILM COATED ORAL 3 TIMES DAILY PRN
Qty: 90 TABLET | Refills: 0 | Status: SHIPPED | OUTPATIENT
Start: 2022-01-18 | End: 2022-05-31

## 2022-01-18 ASSESSMENT — PAIN SCALES - GENERAL: PAINLEVEL: MODERATE PAIN (5)

## 2022-01-18 NOTE — PROGRESS NOTES
[address]  [facility] [address]  Phone: [phone]  Primary Provider: Merlene Le  Pre-op Performing Provider: MERLENE LE      PREOPERATIVE EVALUATION:  Today's date: 1/18/2022    Hannah Krishna is a 51 year old female who presents for a preoperative evaluation.    Surgical Information:  Surgery/Procedure: right Thoracic 9 slective nerve root injection  Surgery Location: [facility]  Surgeon: Dr. González Gibbons  Surgery Date: 1/21/22  Time of Surgery: tbd  Where patient plans to recover: At home with family  Fax number for surgical facility: Note does not need to be faxed, will be available electronically in Epic.    Type of Anesthesia Anticipated: to be determined    Assessment & Plan     The proposed surgical procedure is considered LOW risk.    Preop general physical exam    Chronic right-sided thoracic back pain    Major depressive disorder, recurrent episode, moderate (H)/Anxiety  Situational issues, on Lexapro and Wellbutrin, will refer to counseling.    - Adult Mental Health Referral; Future    Severe obesity (BMI 35.0-39.9) with comorbidity (H)  She is working on her weight, but has many other issues she is dealing with right now.    Benign essential hypertension  Well controlled.     Risks and Recommendations:  The patient has the following additional risks and recommendations for perioperative complications:   - No identified additional risk factors other than previously addressed    Medication Instructions:   - ibuprofen (Advil, Motrin): HOLD 1 day before surgery.     RECOMMENDATION:  APPROVAL GIVEN to proceed with proposed procedure, without further diagnostic evaluation.        Subjective     HPI related to upcoming procedure: chronic thoracic nerve pain      Preop Questions 1/18/2022   1. Have you ever had a heart attack or stroke? No   2. Have you ever had surgery on your heart or blood vessels, such as a stent placement, a  coronary artery bypass, or surgery on an artery in your head, neck, heart, or legs? No   3. Do you have chest pain with activity? No   4. Do you have a history of  heart failure? No   5. Do you currently have a cold, bronchitis or symptoms of other infection? No   6. Do you have a cough, shortness of breath, or wheezing? No   7. Do you or anyone in your family have previous history of blood clots? UNKNOWN -    8. Do you or does anyone in your family have a serious bleeding problem such as prolonged bleeding following surgeries or cuts? UNKNOWN -    9. Have you ever had problems with anemia or been told to take iron pills? No   10. Have you had any abnormal blood loss such as black, tarry or bloody stools, or abnormal vaginal bleeding? No   11. Have you ever had a blood transfusion? No   12. Are you willing to have a blood transfusion if it is medically needed before, during, or after your surgery? Yes   13. Have you or any of your relatives ever had problems with anesthesia? No   14. Do you have sleep apnea, excessive snoring or daytime drowsiness? No   15. Do you have any artifical heart valves or other implanted medical devices like a pacemaker, defibrillator, or continuous glucose monitor? No   16. Do you have artificial joints? No   17. Are you allergic to latex? No   18. Is there any chance that you may be pregnant? No     Health Care Directive:  Patient does not have a Health Care Directive or Living Will: Discussed advance care planning with patient; however, patient declined at this time.    Preoperative Review of :   reviewed - no record of controlled substances prescribed.    Status of Chronic Conditions:  DEPRESSION/ANXIETY - Patient has a long history of Depression and anxietyof moderate severity requiring medication for control with recent symptoms being gradually worsening secondary to her mother's worsening condition    HYPERTENSION - Patient has longstanding history of HTN , currently denies  any symptoms referable to elevated blood pressure. Specifically denies chest pain, palpitations, dyspnea, orthopnea, PND or peripheral edema. Blood pressure readings have been in normal range. Current medication regimen is as listed below. Patient denies any side effects of medication.       Review of Systems  CONSTITUTIONAL: NEGATIVE for fever, chills, change in weight  INTEGUMENTARY/SKIN: NEGATIVE for worrisome rashes, moles or lesions  EYES: NEGATIVE for vision changes or irritation  ENT/MOUTH: NEGATIVE for ear, mouth and throat problems  RESP: NEGATIVE for significant cough or SOB  CV: NEGATIVE for chest pain, palpitations or peripheral edema  GI: NEGATIVE for nausea, abdominal pain, heartburn, or change in bowel habits  : NEGATIVE for frequency, dysuria, or hematuria  MUSCULOSKELETAL: chronic mid thoracic back pain  NEURO: NEGATIVE for weakness, dizziness or paresthesias  ENDOCRINE: NEGATIVE for temperature intolerance, skin/hair changes  HEME: NEGATIVE for bleeding problems  PSYCHIATRIC: anxiety    Patient Active Problem List    Diagnosis Date Noted     Chronic right-sided thoracic back pain 01/26/2021     Priority: Medium     Fusion of spine of cervical region 01/10/2020     Priority: Medium     IUD (intrauterine device) in place 04/09/2018     Priority: Medium     Mirena placed 4/9/18 for abnormal uterine bleeding.  Due for removal April 2023.         Anxiety 07/31/2013     Priority: Medium     Benign essential hypertension 07/17/2013     Priority: Medium     Major depressive disorder, recurrent episode, moderate (H) 12/12/2011     Priority: Medium     Severe obesity (BMI 35.0-39.9) with comorbidity (H) 04/11/2001     Priority: Medium      Past Medical History:   Diagnosis Date     Chest pain, unspecified 11/2005    atypical - stress echo neg     Chronic depressive personality disorder      Esophageal reflux 12/11/2013     Headache 7/31/2013     Hypertension      Lateral epicondylitis 7/28/2015     Malaise  and fatigue 8/1/2005     Obesity, unspecified      Other malaise and fatigue 8/2005     Polycystic ovaries     per pt     Sleep disturbance, unspecified 10/2005    see sleep study     Unspecified disorder of thyroid 9/2005    thyroid nodule. I-123 WNL.     Past Surgical History:   Procedure Laterality Date     ARTHROSCOPY SHOULDER BICEPS TENODESIS REPAIR Right 4/8/2016    Procedure: ARTHROSCOPY SHOULDER BICEPS TENODESIS REPAIR;  Surgeon: Gilbert Cortes DO;  Location[facility]     ARTHROSCOPY SHOULDER DECOMPRESSION Right 4/8/2016    Procedure: ARTHROSCOPY SHOULDER DECOMPRESSION;  Surgeon: Gilbert Cortes DO;  [facility]     ARTHROSCOPY SHOULDER DISTAL CLAVICLE REPAIR Right 4/8/2016    Procedure: ARTHROSCOPY SHOULDER DISTAL CLAVICLE RESECTION;  Surgeon: Gilbert Cortes DO;  Location[facility]     ARTHROSCOPY SHOULDER, OPEN ROTATOR CUFF REPAIR, COMBINED Right 4/8/2016    Procedure: COMBINED ARTHROSCOPY SHOULDER, OPEN ROTATOR CUFF REPAIR;  Surgeon: Gilbert Cortes DO;  [facility]     BUNIONECTOMY  10/24/2013    Procedure: BUNIONECTOMY;;  Surgeon: Ariel Mcdermott DPM;  Location: [facility]     EXCISE EXOSTOSIS FOOT  10/24/2013    Procedure: EXCISE EXOSTOSIS FOOT;  left excision of accessory navicular, bunion repair with osteotomy, and navicular remodeling;  Surgeon: Ariel Mcdermott DPM;  Location: [facility]     EYE SURGERY       [facility] RAD RX IODINE 123 SODIUM IODIDE  UCI, UP  UCI  10/2005    I 123 thryoid -WNL     HEAD & NECK SURGERY      Why is this one highlighted on yes already??     INJECT BLOCK MEDIAL BRANCH CERVICAL/THORACIC/LUMBAR Right 12/4/2020    Procedure: 10,11,12 Medial Branch Block Thoracic and Lumbar-1, Right;  Surgeon: González Gibbons MD;  [facility]     INJECT BLOCK MEDIAL BRANCH CERVICAL/THORACIC/LUMBAR Right 12/29/2020    Procedure: Right thoracic 10,11,12 and lumbar 1 medial branch blocks;  Surgeon: González Gibbons MD;  [facility]      INJECT EPIDURAL THORACIC N/A 10/28/2021    Procedure: Right paramedian T9-10 interlaminar epidural steroid injection using fluoroscopic guidance with contrast dye;  Surgeon: González Gibbons MD;  [facility]     INJECT TRIGGER POINT N/A 10/30/2020    Procedure: thoracic epidural injection T8-9 and trigger point injections thoracic;  Surgeon: González Gibbons MD;  [facility]     OSTEOTOMY FOOT  10/24/2013    Procedure: OSTEOTOMY FOOT;;  Surgeon: Ariel Mcdermott DPM;  [facility]     RADIO FREQUENCY ABLATION PULSED CERVICAL N/A 1/28/2021    Procedure: radiofrequency ablation thoracic 10, 11, 12 and lumabr 1 medial branches;  Surgeon: González Gibbons MD;  Location: [facility]     ZZC LIGATE FALLOPIAN TUBE,POSTPARTUM  1998    Tubal Ligation     Z NONSPECIFIC PROCEDURE  2002    kidney stones removed     Z NONSPECIFIC PROCEDURE  099668    lasik surgery     [facility] STRESS ECHO TEST NL  11/2005    neg (low probability of ischemic disease)     Current Outpatient Medications   Medication Sig Dispense Refill     buPROPion (WELLBUTRIN XL) 300 MG 24 hr tablet Take 1 tablet (300 mg) by mouth every morning 90 tablet 3     Cyanocobalamin (VITAMIN B12 PO)        cyclobenzaprine (FLEXERIL) 5 MG tablet Take 1 tablet (5 mg) by mouth 3 times daily as needed for muscle spasms 30 tablet 1     escitalopram (LEXAPRO) 10 MG tablet Take 1 tablet (10 mg) by mouth daily 90 tablet 3     fexofenadine (ALLEGRA) 180 MG tablet Take 180 mg by mouth daily       ibuprofen (ADVIL/MOTRIN) 200 MG capsule Take 2 capsule for pain       levonorgestrel (MIRENA) 20 MCG/24HR IUD 1 each by Intrauterine route once       metoprolol succinate ER (TOPROL-XL) 50 MG 24 hr tablet TAKE ONE TABLET BY MOUTH ONCE DAILY 90 tablet 3     Pyridoxine HCl (VITAMIN B-6 PO)        vitamin D3 (CHOLECALCIFEROL) 2000 units tablet Take 1 tablet by mouth daily         Allergies   Allergen Reactions     No Known Drug Allergies         Social History     Tobacco Use      Smoking status: Former Smoker     Packs/day: 0.10     Years: 18.00     Pack years: 1.80     Types: Cigarettes     Quit date: 2014     Years since quittin.2     Smokeless tobacco: Never Used     Tobacco comment: since    Substance Use Topics     Alcohol use: Yes     Comment: Occasional       History   Drug Use No         Objective     /80   Pulse 65   Temp 97.3  F (36.3  C) (Temporal)   Resp 20   Wt 101.4 kg (223 lb 8 oz)   SpO2 99%   BMI 38.36 kg/m      Physical Exam    GENERAL APPEARANCE: healthy, alert and no distress     EYES: EOMI, PERRL     HENT: ear canals and TM's normal and nose and mouth without ulcers or lesions     NECK: no adenopathy, no asymmetry, masses, or scars and thyroid normal to palpation     RESP: lungs clear to auscultation - no rales, rhonchi or wheezes     CV: regular rates and rhythm, normal S1 S2, no S3 or S4 and no murmur, click or rub     ABDOMEN:  soft, nontender, no HSM or masses and bowel sounds normal     MS: extremities normal- no gross deformities noted, no evidence of inflammation in joints, FROM in all extremities.     SKIN: no suspicious lesions or rashes     NEURO: Normal strength and tone, sensory exam grossly normal, mentation intact and speech normal     PSYCH: mentation appears normal. and affect normal/bright     LYMPHATICS: No cervical adenopathy    Recent Labs   Lab Test 21  1218   HGB 12.5         POTASSIUM 3.8   CR 0.98        Diagnostics:  No labs were ordered during this visit.   No EKG required for low risk surgery (cataract, skin procedure, breast biopsy, etc).    Revised Cardiac Risk Index (RCRI):  The patient has the following serious cardiovascular risks for perioperative complications:   - No serious cardiac risks = 0 points     RCRI Interpretation: 0 points: Class I (very low risk - 0.4% complication rate)           Signed Electronically by: Merlene Paz MD  Copy of this evaluation report is provided to  requesting physician.

## 2022-01-18 NOTE — H&P (VIEW-ONLY)
[address]  [facility] [address]  Phone: [phone]  Primary Provider: Merlene Le  Pre-op Performing Provider: MERLENE LE      PREOPERATIVE EVALUATION:  Today's date: 1/18/2022    Hannah Krishna is a 51 year old female who presents for a preoperative evaluation.    Surgical Information:  Surgery/Procedure: right Thoracic 9 slective nerve root injection  Surgery Location: [facility]  Surgeon: Dr. González Gibbons  Surgery Date: 1/21/22  Time of Surgery: tbd  Where patient plans to recover: At home with family  Fax number for surgical facility: Note does not need to be faxed, will be available electronically in Epic.    Type of Anesthesia Anticipated: to be determined    Assessment & Plan     The proposed surgical procedure is considered LOW risk.    Preop general physical exam    Chronic right-sided thoracic back pain    Major depressive disorder, recurrent episode, moderate (H)/Anxiety  Situational issues, on Lexapro and Wellbutrin, will refer to counseling.    - Adult Mental Health Referral; Future    Severe obesity (BMI 35.0-39.9) with comorbidity (H)  She is working on her weight, but has many other issues she is dealing with right now.    Benign essential hypertension  Well controlled.     Risks and Recommendations:  The patient has the following additional risks and recommendations for perioperative complications:   - No identified additional risk factors other than previously addressed    Medication Instructions:   - ibuprofen (Advil, Motrin): HOLD 1 day before surgery.     RECOMMENDATION:  APPROVAL GIVEN to proceed with proposed procedure, without further diagnostic evaluation.        Subjective     HPI related to upcoming procedure: chronic thoracic nerve pain      Preop Questions 1/18/2022   1. Have you ever had a heart attack or stroke? No   2. Have you ever had surgery on your heart or blood vessels, such as a stent placement, a  coronary artery bypass, or surgery on an artery in your head, neck, heart, or legs? No   3. Do you have chest pain with activity? No   4. Do you have a history of  heart failure? No   5. Do you currently have a cold, bronchitis or symptoms of other infection? No   6. Do you have a cough, shortness of breath, or wheezing? No   7. Do you or anyone in your family have previous history of blood clots? UNKNOWN -    8. Do you or does anyone in your family have a serious bleeding problem such as prolonged bleeding following surgeries or cuts? UNKNOWN -    9. Have you ever had problems with anemia or been told to take iron pills? No   10. Have you had any abnormal blood loss such as black, tarry or bloody stools, or abnormal vaginal bleeding? No   11. Have you ever had a blood transfusion? No   12. Are you willing to have a blood transfusion if it is medically needed before, during, or after your surgery? Yes   13. Have you or any of your relatives ever had problems with anesthesia? No   14. Do you have sleep apnea, excessive snoring or daytime drowsiness? No   15. Do you have any artifical heart valves or other implanted medical devices like a pacemaker, defibrillator, or continuous glucose monitor? No   16. Do you have artificial joints? No   17. Are you allergic to latex? No   18. Is there any chance that you may be pregnant? No     Health Care Directive:  Patient does not have a Health Care Directive or Living Will: Discussed advance care planning with patient; however, patient declined at this time.    Preoperative Review of :   reviewed - no record of controlled substances prescribed.    Status of Chronic Conditions:  DEPRESSION/ANXIETY - Patient has a long history of Depression and anxietyof moderate severity requiring medication for control with recent symptoms being gradually worsening secondary to her mother's worsening condition    HYPERTENSION - Patient has longstanding history of HTN , currently denies  any symptoms referable to elevated blood pressure. Specifically denies chest pain, palpitations, dyspnea, orthopnea, PND or peripheral edema. Blood pressure readings have been in normal range. Current medication regimen is as listed below. Patient denies any side effects of medication.       Review of Systems  CONSTITUTIONAL: NEGATIVE for fever, chills, change in weight  INTEGUMENTARY/SKIN: NEGATIVE for worrisome rashes, moles or lesions  EYES: NEGATIVE for vision changes or irritation  ENT/MOUTH: NEGATIVE for ear, mouth and throat problems  RESP: NEGATIVE for significant cough or SOB  CV: NEGATIVE for chest pain, palpitations or peripheral edema  GI: NEGATIVE for nausea, abdominal pain, heartburn, or change in bowel habits  : NEGATIVE for frequency, dysuria, or hematuria  MUSCULOSKELETAL: chronic mid thoracic back pain  NEURO: NEGATIVE for weakness, dizziness or paresthesias  ENDOCRINE: NEGATIVE for temperature intolerance, skin/hair changes  HEME: NEGATIVE for bleeding problems  PSYCHIATRIC: anxiety    Patient Active Problem List    Diagnosis Date Noted     Chronic right-sided thoracic back pain 01/26/2021     Priority: Medium     Fusion of spine of cervical region 01/10/2020     Priority: Medium     IUD (intrauterine device) in place 04/09/2018     Priority: Medium     Mirena placed 4/9/18 for abnormal uterine bleeding.  Due for removal April 2023.         Anxiety 07/31/2013     Priority: Medium     Benign essential hypertension 07/17/2013     Priority: Medium     Major depressive disorder, recurrent episode, moderate (H) 12/12/2011     Priority: Medium     Severe obesity (BMI 35.0-39.9) with comorbidity (H) 04/11/2001     Priority: Medium      Past Medical History:   Diagnosis Date     Chest pain, unspecified 11/2005    atypical - stress echo neg     Chronic depressive personality disorder      Esophageal reflux 12/11/2013     Headache 7/31/2013     Hypertension      Lateral epicondylitis 7/28/2015     Malaise  and fatigue 8/1/2005     Obesity, unspecified      Other malaise and fatigue 8/2005     Polycystic ovaries     per pt     Sleep disturbance, unspecified 10/2005    see sleep study     Unspecified disorder of thyroid 9/2005    thyroid nodule. I-123 WNL.     Past Surgical History:   Procedure Laterality Date     ARTHROSCOPY SHOULDER BICEPS TENODESIS REPAIR Right 4/8/2016    Procedure: ARTHROSCOPY SHOULDER BICEPS TENODESIS REPAIR;  Surgeon: Gilbert Cortes DO;  [facility]     ARTHROSCOPY SHOULDER DECOMPRESSION Right 4/8/2016    Procedure: ARTHROSCOPY SHOULDER DECOMPRESSION;  Surgeon: Gilbert Cortes DO;  [facility]     ARTHROSCOPY SHOULDER DISTAL CLAVICLE REPAIR Right 4/8/2016    Procedure: ARTHROSCOPY SHOULDER DISTAL CLAVICLE RESECTION;  Surgeon: Gilbert Cortes DO;  [facility]     ARTHROSCOPY SHOULDER, OPEN ROTATOR CUFF REPAIR, COMBINED Right 4/8/2016    Procedure: COMBINED ARTHROSCOPY SHOULDER, OPEN ROTATOR CUFF REPAIR;  Surgeon: Gilbert Cortes DO;  [facility]     BUNIONECTOMY  10/24/2013    Procedure: BUNIONECTOMY;;  Surgeon: Ariel Mcdermott DPM;  [facility]     EXCISE EXOSTOSIS FOOT  10/24/2013    Procedure: EXCISE EXOSTOSIS FOOT;  left excision of accessory navicular, bunion repair with osteotomy, and navicular remodeling;  Surgeon: Ariel Mcdermott DPM;  [facility]     EYE SURGERY       HC RAD RX IODINE 123 SODIUM IODIDE  UCI, UP  UCI  10/2005    I 123 thryoid -WNL     HEAD & NECK SURGERY      Why is this one highlighted on yes already??     INJECT BLOCK MEDIAL BRANCH CERVICAL/THORACIC/LUMBAR Right 12/4/2020    Procedure: 10,11,12 Medial Branch Block Thoracic and Lumbar-1, Right;  Surgeon: González Gibbons MD;  [facility]     INJECT BLOCK MEDIAL BRANCH CERVICAL/THORACIC/LUMBAR Right 12/29/2020    Procedure: Right thoracic 10,11,12 and lumbar 1 medial branch blocks;  Surgeon: González Gibbons MD;  [facility]      INJECT EPIDURAL THORACIC N/A 10/28/2021    Procedure: Right paramedian T9-10 interlaminar epidural steroid injection using fluoroscopic guidance with contrast dye;  Surgeon: González Gibbons MD;  [facility]     INJECT TRIGGER POINT N/A 10/30/2020    Procedure: thoracic epidural injection T8-9 and trigger point injections thoracic;  Surgeon: González Gibbons MD;  [facility]     OSTEOTOMY FOOT  10/24/2013    Procedure: OSTEOTOMY FOOT;;  Surgeon: Ariel Mcdermott DPM;  [facility]     RADIO FREQUENCY ABLATION PULSED CERVICAL N/A 1/28/2021    Procedure: radiofrequency ablation thoracic 10, 11, 12 and lumabr 1 medial branches;  Surgeon: González Gibbons MD;  [facility]     ZZC LIGATE FALLOPIAN TUBE,POSTPARTUM  1998    Tubal Ligation     Z NONSPECIFIC PROCEDURE  2002    kidney stones removed     Z NONSPECIFIC PROCEDURE  281481    lasik surgery     [facility] STRESS ECHO TEST NL  11/2005    neg (low probability of ischemic disease)     Current Outpatient Medications   Medication Sig Dispense Refill     buPROPion (WELLBUTRIN XL) 300 MG 24 hr tablet Take 1 tablet (300 mg) by mouth every morning 90 tablet 3     Cyanocobalamin (VITAMIN B12 PO)        cyclobenzaprine (FLEXERIL) 5 MG tablet Take 1 tablet (5 mg) by mouth 3 times daily as needed for muscle spasms 30 tablet 1     escitalopram (LEXAPRO) 10 MG tablet Take 1 tablet (10 mg) by mouth daily 90 tablet 3     fexofenadine (ALLEGRA) 180 MG tablet Take 180 mg by mouth daily       ibuprofen (ADVIL/MOTRIN) 200 MG capsule Take 2 capsule for pain       levonorgestrel (MIRENA) 20 MCG/24HR IUD 1 each by Intrauterine route once       metoprolol succinate ER (TOPROL-XL) 50 MG 24 hr tablet TAKE ONE TABLET BY MOUTH ONCE DAILY 90 tablet 3     Pyridoxine HCl (VITAMIN B-6 PO)        vitamin D3 (CHOLECALCIFEROL) 2000 units tablet Take 1 tablet by mouth daily         Allergies   Allergen Reactions     No Known Drug Allergies         Social History     Tobacco Use      Smoking status: Former Smoker     Packs/day: 0.10     Years: 18.00     Pack years: 1.80     Types: Cigarettes     Quit date: 2014     Years since quittin.2     Smokeless tobacco: Never Used     Tobacco comment: since    Substance Use Topics     Alcohol use: Yes     Comment: Occasional       History   Drug Use No         Objective     /80   Pulse 65   Temp 97.3  F (36.3  C) (Temporal)   Resp 20   Wt 101.4 kg (223 lb 8 oz)   SpO2 99%   BMI 38.36 kg/m      Physical Exam    GENERAL APPEARANCE: healthy, alert and no distress     EYES: EOMI, PERRL     HENT: ear canals and TM's normal and nose and mouth without ulcers or lesions     NECK: no adenopathy, no asymmetry, masses, or scars and thyroid normal to palpation     RESP: lungs clear to auscultation - no rales, rhonchi or wheezes     CV: regular rates and rhythm, normal S1 S2, no S3 or S4 and no murmur, click or rub     ABDOMEN:  soft, nontender, no HSM or masses and bowel sounds normal     MS: extremities normal- no gross deformities noted, no evidence of inflammation in joints, FROM in all extremities.     SKIN: no suspicious lesions or rashes     NEURO: Normal strength and tone, sensory exam grossly normal, mentation intact and speech normal     PSYCH: mentation appears normal. and affect normal/bright     LYMPHATICS: No cervical adenopathy    Recent Labs   Lab Test 21  1218   HGB 12.5         POTASSIUM 3.8   CR 0.98        Diagnostics:  No labs were ordered during this visit.   No EKG required for low risk surgery (cataract, skin procedure, breast biopsy, etc).    Revised Cardiac Risk Index (RCRI):  The patient has the following serious cardiovascular risks for perioperative complications:   - No serious cardiac risks = 0 points     RCRI Interpretation: 0 points: Class I (very low risk - 0.4% complication rate)           Signed Electronically by: Merlene Paz MD  Copy of this evaluation report is provided to  requesting physician.

## 2022-01-20 ENCOUNTER — ANESTHESIA EVENT (OUTPATIENT)
Dept: SURGERY | Facility: CLINIC | Age: 52
End: 2022-01-20
Payer: OTHER MISCELLANEOUS

## 2022-01-20 ASSESSMENT — LIFESTYLE VARIABLES: TOBACCO_USE: 1

## 2022-01-21 ENCOUNTER — HOSPITAL ENCOUNTER (OUTPATIENT)
Facility: CLINIC | Age: 52
Discharge: HOME | End: 2022-01-21
Attending: ANESTHESIOLOGY | Admitting: ANESTHESIOLOGY
Payer: OTHER MISCELLANEOUS

## 2022-01-21 ENCOUNTER — HOSPITAL ENCOUNTER (OUTPATIENT)
Dept: GENERAL RADIOLOGY | Facility: CLINIC | Age: 52
End: 2022-01-21
Attending: ANESTHESIOLOGY | Admitting: ANESTHESIOLOGY
Payer: OTHER MISCELLANEOUS

## 2022-01-21 ENCOUNTER — ANESTHESIA (OUTPATIENT)
Dept: SURGERY | Facility: CLINIC | Age: 52
End: 2022-01-21
Payer: OTHER MISCELLANEOUS

## 2022-01-21 VITALS
RESPIRATION RATE: 16 BRPM | WEIGHT: 223 LBS | TEMPERATURE: 98 F | OXYGEN SATURATION: 99 % | SYSTOLIC BLOOD PRESSURE: 153 MMHG | BODY MASS INDEX: 38.07 KG/M2 | DIASTOLIC BLOOD PRESSURE: 74 MMHG | HEART RATE: 50 BPM | HEIGHT: 64 IN

## 2022-01-21 DIAGNOSIS — M54.6 ACUTE RIGHT-SIDED THORACIC BACK PAIN: ICD-10-CM

## 2022-01-21 PROCEDURE — 64479 NJX AA&/STRD TFRM EPI C/T 1: CPT | Mod: RT | Performed by: ANESTHESIOLOGY

## 2022-01-21 PROCEDURE — 250N000009 HC RX 250: Performed by: NURSE ANESTHETIST, CERTIFIED REGISTERED

## 2022-01-21 PROCEDURE — 77003 FLUOROGUIDE FOR SPINE INJECT: CPT | Mod: TC

## 2022-01-21 PROCEDURE — 64479 NJX AA&/STRD TFRM EPI C/T 1: CPT | Performed by: ANESTHESIOLOGY

## 2022-01-21 PROCEDURE — 250N000011 HC RX IP 250 OP 636: Performed by: ANESTHESIOLOGY

## 2022-01-21 PROCEDURE — 370N000017 HC ANESTHESIA TECHNICAL FEE, PER MIN: Performed by: ANESTHESIOLOGY

## 2022-01-21 PROCEDURE — 250N000011 HC RX IP 250 OP 636: Performed by: NURSE ANESTHETIST, CERTIFIED REGISTERED

## 2022-01-21 RX ORDER — LIDOCAINE 40 MG/G
CREAM TOPICAL
Status: DISCONTINUED | OUTPATIENT
Start: 2022-01-21 | End: 2022-01-21 | Stop reason: HOSPADM

## 2022-01-21 RX ORDER — BUPIVACAINE HYDROCHLORIDE 7.5 MG/ML
INJECTION, SOLUTION EPIDURAL; RETROBULBAR PRN
Status: DISCONTINUED | OUTPATIENT
Start: 2022-01-21 | End: 2022-01-21 | Stop reason: HOSPADM

## 2022-01-21 RX ORDER — PROPOFOL 10 MG/ML
INJECTION, EMULSION INTRAVENOUS PRN
Status: DISCONTINUED | OUTPATIENT
Start: 2022-01-21 | End: 2022-01-21

## 2022-01-21 RX ORDER — LIDOCAINE HYDROCHLORIDE 20 MG/ML
INJECTION, SOLUTION INFILTRATION; PERINEURAL PRN
Status: DISCONTINUED | OUTPATIENT
Start: 2022-01-21 | End: 2022-01-21

## 2022-01-21 RX ORDER — TRIAMCINOLONE ACETONIDE 40 MG/ML
INJECTION, SUSPENSION INTRA-ARTICULAR; INTRAMUSCULAR PRN
Status: DISCONTINUED | OUTPATIENT
Start: 2022-01-21 | End: 2022-01-21 | Stop reason: HOSPADM

## 2022-01-21 RX ADMIN — LIDOCAINE HYDROCHLORIDE 40 MG: 20 INJECTION, SOLUTION INFILTRATION; PERINEURAL at 15:41

## 2022-01-21 RX ADMIN — PROPOFOL 30 MG: 10 INJECTION, EMULSION INTRAVENOUS at 15:47

## 2022-01-21 RX ADMIN — PROPOFOL 20 MG: 10 INJECTION, EMULSION INTRAVENOUS at 15:53

## 2022-01-21 RX ADMIN — PROPOFOL 50 MG: 10 INJECTION, EMULSION INTRAVENOUS at 15:41

## 2022-01-21 ASSESSMENT — MIFFLIN-ST. JEOR: SCORE: 1616.52

## 2022-01-21 NOTE — ANESTHESIA POSTPROCEDURE EVALUATION
Patient: Hannah Krishna    Procedure: Procedure(s):  right Thoracic 9 selective nerve root injection       Diagnosis:Spondylosis of thoracic region without myelopathy or radiculopathy [M47.814]  Diagnosis Additional Information: No value filed.    Anesthesia Type:  MAC    Note:  Disposition: Outpatient   Postop Pain Control: Uneventful            Sign Out: Well controlled pain   PONV: No   Neuro/Psych: Uneventful            Sign Out: Acceptable/Baseline neuro status   Airway/Respiratory: Uneventful            Sign Out: Acceptable/Baseline resp. status   CV/Hemodynamics: Uneventful            Sign Out: Acceptable CV status   Other NRE: NONE   DID A NON-ROUTINE EVENT OCCUR? No    Event details/Postop Comments:  Pt was happy with anesthesia care.  No complications.  I will follow up with the pt if needed.           Last vitals:  Vitals Value Taken Time   BP     Temp     Pulse     Resp     SpO2         Electronically Signed By: VEENA Cristina CRNA  January 21, 2022  4:03 PM

## 2022-01-21 NOTE — OP NOTE
PRIMARY PROBLEM: Right-sided thoracic back pain    PROCEDURE: Low volume diagnostic and therapeutic right T9 nerve root(transforaminal)  Injection with fluoroscopic guidance and contrast.     PROCEDURE DETAILS: After written informed consent was obtained from the patient, the patient was escorted to the procedure room.  The patient was placed in the prone position.  A  time out  was conducted to verify patient identity, procedure to be performed, side, site, allergies and any special requirements.  The skin over the thoracolumbar region was prepped and draped in normal sterile fashion. Fluoroscopy was used to identify the neural foramen in AP view and the skin was anesthetized with 2 mL of 1% lidocaine with bicarbonate buffer. A 25-gauge Quincke spinal needle was advanced through this location and advanced under fluoroscopic guidance towards her right T9-10 neural foramen.  After placing the needle in towards the right T9-10 foramen walked into the posterior aspect of the foramen in the lateral fluoroscopic image I went back to the AP image and Omnipaque contrast was injected showing primarily spread over the nerve root as well as intercostal nerve of T9.  I injected 1/2 cc of 0.75% bupivacaine with 4 mg/cc of triamcinolone.  I also wanted to involve the right T9 dorsal root ganglion therefore the needle was advanced slightly more medial and anteriorly until had spread over the T9 dorsal root ganglion.  There was no evidence of any intravascular, intrathecal, intraneural injection.  I then injected another 0.4 cc of 0.75% bupivacaine with 4 mg/cc of triamcinolone.  There was good washout over the T9 nerve root.  BLOOD LOSS: < 5 cc    DIAGNOSIS:  1.  Right T9 versus right T10 thoracic radiculopathy    PLAN:  1. Performed a   Low volume diagnostic T9 nerve root injection.  She is going to fill out a pain relief form and send this back to me and I will diagnostically interpret the pain form.  If it is diagnostically  negative I plan on moving down to the T10 level.  I think most likely this is coming from T9 or T10.  If we pinpoint a pain generator then I will refer her to neurosurgery for decompression of that level.    González Gibbons MD  Diplomate of the American Board of Anesthesiology, Pain Medicine

## 2022-01-21 NOTE — ANESTHESIA PREPROCEDURE EVALUATION
Anesthesia Pre-Procedure Evaluation    Patient: Hannah Krishna   MRN: [id number] : 1970        Preoperative Diagnosis: Chronic right-sided thoracic back pain [M54.6, G89.29]  DDD (degenerative disc disease), lumbar [M51.36]    Procedure : Procedure(s):  INJECTION, SPINE, THORACIC, EPIDURAL (levels per Dr. devi)          Past Medical History:   Diagnosis Date     Chest pain, unspecified 2005    atypical - stress echo neg     Chronic depressive personality disorder      Esophageal reflux 2013     Headache 2013     Hypertension      Lateral epicondylitis 2015     Malaise and fatigue 2005     Obesity, unspecified      Other malaise and fatigue 2005     Polycystic ovaries     per pt     Sleep disturbance, unspecified 10/2005    see sleep study     Unspecified disorder of thyroid 2005    thyroid nodule. I-123 WNL.      Past Surgical History:   Procedure Laterality Date     ARTHROSCOPY SHOULDER BICEPS TENODESIS REPAIR Right 2016    Procedure: ARTHROSCOPY SHOULDER BICEPS TENODESIS REPAIR;  Surgeon: Gilbert Cortes DO;  [facility]     ARTHROSCOPY SHOULDER DECOMPRESSION Right 2016    Procedure: ARTHROSCOPY SHOULDER DECOMPRESSION;  Surgeon: Gilbert Cortes DO;  [facility]     ARTHROSCOPY SHOULDER DISTAL CLAVICLE REPAIR Right 2016    Procedure: ARTHROSCOPY SHOULDER DISTAL CLAVICLE RESECTION;  Surgeon: Gilbert Cortes DO;  [facility]     ARTHROSCOPY SHOULDER, OPEN ROTATOR CUFF REPAIR, COMBINED Right 2016    Procedure: COMBINED ARTHROSCOPY SHOULDER, OPEN ROTATOR CUFF REPAIR;  Surgeon: Gilbert Cortes DO;  [facility]     BUNIONECTOMY  10/24/2013    Procedure: BUNIONECTOMY;;  Surgeon: Ariel Mcdermott DPM;  [facility]     EXCISE EXOSTOSIS FOOT  10/24/2013    Procedure: EXCISE EXOSTOSIS FOOT;  left excision of accessory navicular, bunion repair with osteotomy, and navicular remodeling;  Surgeon: Ariel Mcdermott  CASA VACA;  [facility]     EYE SURGERY       HC RAD RX IODINE 123 SODIUM IODIDE  UCI, UP  UCI  10/2005    I 123 thryoid -WNL     HEAD & NECK SURGERY      Why is this one highlighted on yes already??     INJECT BLOCK MEDIAL BRANCH CERVICAL/THORACIC/LUMBAR Right 2020    Procedure: 10,11,12 Medial Branch Block Thoracic and Lumbar-1, Right;  Surgeon: González Gibbons MD;  [facility]     INJECT BLOCK MEDIAL BRANCH CERVICAL/THORACIC/LUMBAR Right 2020    Procedure: Right thoracic 10,11,12 and lumbar 1 medial branch blocks;  Surgeon: González Gibbons MD;  [facility]     INJECT EPIDURAL THORACIC N/A 10/28/2021    Procedure: Right paramedian T9-10 interlaminar epidural steroid injection using fluoroscopic guidance with contrast dye;  Surgeon: González Gibbons MD;  [facility]     INJECT TRIGGER POINT N/A 10/30/2020    Procedure: thoracic epidural injection T8-9 and trigger point injections thoracic;  Surgeon: González Gibbons MD;  [facility]     OSTEOTOMY FOOT  10/24/2013    Procedure: OSTEOTOMY FOOT;;  Surgeon: Ariel Mcdermott DPM;  [facility]     RADIO FREQUENCY ABLATION PULSED CERVICAL N/A 2021    Procedure: radiofrequency ablation thoracic 10, 11, 12 and lumabr 1 medial branches;  Surgeon: González Gibbons MD;  [facility]     ZZC LIGATE FALLOPIAN TUBE,POSTPARTUM      Tubal Ligation     [facility] NONSPECIFIC PROCEDURE      kidney stones removed     [facility] NONSPECIFIC PROCEDURE  [id number]    lasik surgery     [facility] STRESS ECHO TEST NL  2005    neg (low probability of ischemic disease)      Allergies   Allergen Reactions     No Known Drug Allergies       Social History     Tobacco Use     Smoking status: Former Smoker     Packs/day: 0.10     Years: 18.00     Pack years: 1.80     Types: Cigarettes     Quit date: 2014     Years since quittin.2     Smokeless tobacco: Never Used     Tobacco comment: since    Substance Use Topics     Alcohol use: Yes      Comment: Occasional      Wt Readings from Last 1 Encounters:   01/18/22 101.4 kg (223 lb 8 oz)        Anesthesia Evaluation   Pt has had prior anesthetic. Type: General.    No history of anesthetic complications       ROS/MED HX  ENT/Pulmonary:     (+) tobacco use, Past use, 2  Pack-Year Hx,      Neurologic:  - neg neurologic ROS     Cardiovascular:     (+) Dyslipidemia hypertension-range: < 140 / 90/ ----Previous cardiac testing   Echo: Date: Results:    Stress Test: Date: Results:    ECG Reviewed: Date: 8/20/2019 Results:  SB  Cath: Date: Results:      METS/Exercise Tolerance:     Hematologic:  - neg hematologic  ROS     Musculoskeletal: Comment: Malaise and fatigue     Chronic back pain      GI/Hepatic:     (+) GERD,     Renal/Genitourinary:       Endo:     (+) Obesity,  (-) thyroid disease   Psychiatric/Substance Use:     (+) psychiatric history anxiety and depression     Infectious Disease:  - neg infectious disease ROS     Malignancy:  - neg malignancy ROS     Other:            Physical Exam    Airway      Comment: Fusion of spine of cervical region    Mallampati: II   TM distance: > 3 FB   Neck ROM: full   Mouth opening: > 3 cm    Respiratory Devices and Support         Dental  no notable dental history         Cardiovascular   cardiovascular exam normal          Pulmonary   pulmonary exam normal                OUTSIDE LABS:  CBC:   Lab Results   Component Value Date    WBC 6.6 01/26/2021    WBC 5.5 08/20/2019    HGB 12.5 01/26/2021    HGB 12.0 08/20/2019    HCT 37.5 01/26/2021    HCT 36.5 08/20/2019     01/26/2021     08/20/2019     BMP:   Lab Results   Component Value Date     01/26/2021     07/08/2016    POTASSIUM 3.8 01/26/2021    POTASSIUM 4.4 07/08/2016    CHLORIDE 107 01/26/2021    CHLORIDE 108 07/08/2016    CO2 27 01/26/2021    CO2 22 07/08/2016    BUN 22 01/26/2021    BUN 9 07/08/2016    CR 0.98 01/26/2021    CR 0.86 07/08/2016    GLC 78 01/26/2021    GLC 83 02/07/2020      COAGS: No results found for: PTT, INR, FIBR  POC:   Lab Results   Component Value Date    HCG Negative 04/08/2016     HEPATIC:   Lab Results   Component Value Date    ALBUMIN 3.5 07/08/2016    PROTTOTAL 7.2 07/08/2016    ALT 27 07/08/2016    AST 17 07/08/2016    GGT 29 12/20/2005    ALKPHOS 93 07/08/2016    BILITOTAL 0.6 07/08/2016    BILIDIRECT 0.1 10/09/2002     OTHER:   Lab Results   Component Value Date    PH 7.0 04/11/2001    A1C 5.2 12/13/2005    TORREY 9.2 01/26/2021    LIPASE 38 08/31/2011    AMYLASE 55 08/31/2011    TSH 1.48 01/26/2021    T4 0.84 10/19/2005    SED 11 07/17/2013       Anesthesia Plan    ASA Status:  2   NPO Status:  NPO Appropriate    Anesthesia Type: MAC.     - Reason for MAC: Deep or markedly invasive procedure (G8)   Induction: Intravenous, Propofol.   Maintenance: TIVA.        Consents    Anesthesia Plan(s) and associated risks, benefits, and realistic alternatives discussed. Questions answered and patient/representative(s) expressed understanding.    - Discussed:     - Discussed with:  Patient      - Extended Intubation/Ventilatory Support Discussed: No.      - Patient is DNR/DNI Status: No    Use of blood products discussed: No .     Postoperative Care    Pain management: Oral pain medications.        Comments:    Other Comments: The risks and benefits of anesthesia, and the alternatives where applicable, have been discussed with the patient, and they wish to proceed.                VEENA Cristina CRNA

## 2022-01-21 NOTE — DISCHARGE INSTRUCTIONS

## 2022-01-21 NOTE — ANESTHESIA CARE TRANSFER NOTE
Patient: Hannah Krishna    Procedure: Procedure(s):  right Thoracic 9 selective nerve root injection       Diagnosis: Spondylosis of thoracic region without myelopathy or radiculopathy [M47.814]  Diagnosis Additional Information: No value filed.    Anesthesia Type:   MAC     Note:    Oropharynx: oropharynx clear of all foreign objects and spontaneously breathing  Level of Consciousness: drowsy  Oxygen Supplementation: room air    Independent Airway: airway patency satisfactory and stable  Dentition: dentition unchanged  Vital Signs Stable: post-procedure vital signs reviewed and stable  Report to RN Given: handoff report given  Patient transferred to: Phase II    Handoff Report: Identifed the Patient, Identified the Reponsible Provider, Reviewed the pertinent medical history, Discussed the surgical course, Reviewed Intra-OP anesthesia mangement and issues during anesthesia, Set expectations for post-procedure period and Allowed opportunity for questions and acknowledgement of understanding      Vitals:  Vitals Value Taken Time   BP     Temp     Pulse     Resp     SpO2         Electronically Signed By: VEENA Cristnia CRNA  January 21, 2022  4:03 PM

## 2022-01-27 ENCOUNTER — TELEPHONE (OUTPATIENT)
Dept: NEUROSURGERY | Facility: CLINIC | Age: 52
End: 2022-01-27
Payer: COMMERCIAL

## 2022-01-27 ENCOUNTER — OFFICE VISIT (OUTPATIENT)
Dept: NEUROSURGERY | Facility: CLINIC | Age: 52
End: 2022-01-27
Payer: OTHER MISCELLANEOUS

## 2022-01-27 VITALS
BODY MASS INDEX: 38.07 KG/M2 | DIASTOLIC BLOOD PRESSURE: 82 MMHG | WEIGHT: 223 LBS | TEMPERATURE: 96.2 F | HEIGHT: 64 IN | SYSTOLIC BLOOD PRESSURE: 134 MMHG

## 2022-01-27 DIAGNOSIS — M47.24 OSTEOARTHRITIS OF SPINE WITH RADICULOPATHY, THORACIC REGION: Primary | ICD-10-CM

## 2022-01-27 DIAGNOSIS — M54.6 CHRONIC RIGHT-SIDED THORACIC BACK PAIN: ICD-10-CM

## 2022-01-27 DIAGNOSIS — M47.24 OSTEOARTHRITIS OF SPINE WITH RADICULOPATHY, THORACIC REGION: ICD-10-CM

## 2022-01-27 DIAGNOSIS — G89.29 CHRONIC RIGHT-SIDED THORACIC BACK PAIN: ICD-10-CM

## 2022-01-27 DIAGNOSIS — M54.14 THORACIC RADICULOPATHY: ICD-10-CM

## 2022-01-27 DIAGNOSIS — Z01.818 PREOP TESTING: Primary | ICD-10-CM

## 2022-01-27 PROCEDURE — 99214 OFFICE O/P EST MOD 30 MIN: CPT | Performed by: NURSE PRACTITIONER

## 2022-01-27 ASSESSMENT — MIFFLIN-ST. JEOR: SCORE: 1616.52

## 2022-01-27 ASSESSMENT — PAIN SCALES - GENERAL: PAINLEVEL: MODERATE PAIN (5)

## 2022-01-27 NOTE — PROGRESS NOTES
Reviewed pre- and post-operative instructions as outlined in the After Visit Summary/Patient Instructions with patient.   Surgery folder was given to patient and spouse    Patient Education Topic: Procedure with Dr. Burnette     Learner(s): Patient and spouse    Knowledge Level: Basic    Readiness to Learn: Ready    Method:  Verbal explanation and Written material     Outcome: Able to verbalize instructions    Barriers to Learning: No barrier    Covid Testing: patient educated they will need to have a test for the novel Coronavirus, COVID-19.The test needs to be completed within 4 days (96) hours of surgery. Order Placed.    Scheduling Number: [phone]    NDI/JESSICA:  Confirmation of completion within last 6 months      Patient had the opportunity for questions to be answered. Advised Patient to call clinic with any questions/concerns. Gave patient antibacterial soap for pre-surgery skin preparation.     Sunita Estrada RN on 1/27/2022 at 12:52 PM

## 2022-01-27 NOTE — LETTER
January 31, 2022      Hannah Krishna  [address] [address] [place] [address]        To Whom It May Concern:    Hannah Krishna  was seen on 1/27/22.  She is scheduled for spine surgery 3/2/22. Please excuse her from work now through 4 weeks after surgery for recovery and healing.        Sincerely,        Red Burnette MD  (electronically signed)

## 2022-01-27 NOTE — PROGRESS NOTES
"[facility] Neurosurgery  Neurosurgery Follow Up:    HPI: Hannah Krishna is a 51 year old female presents for a follow up in regards to her right-sided mid back pain. She underwent a TESI with trigger point injections with 3 days of good relief. She states she continues to have the same pain as prior. She has undergone chiropractic therapy without much sustainable benefit. Her chiropractor is managing her work restrictions. She states the pain is aggravated by touch and ROM in the T10-L2 region.     She presents for a phone visit for follow up. She reports she was told to follow up routinely after the RFA procedure. She notes significant improvement in pre procedure. She has some muscle pain at the location of the sites. She states she is ready to go back to work. She had prior restrictions from her chiropractor for 4 hours per day until the end of February. He is deferring further restrictions to our office.     She presents for a follow up. She underwent a diagnostic and therapeutic right T9 nerve root injection with Dr. Gibbons on 1/21/2022. She states she had 2 days of complete resolution of symptoms. Since 1/24/2022 the symptoms have returned. She reports right-sided mid back pain that radiates to her right side. She denies any leg symptoms.     Medical, surgical, family, and social history unchanged since prior exam.  Exam:  Constitutional:  Alert, well nourished, NAD.  HEENT: Normocephalic, atraumatic.   Pulm:  Without shortness of breath   CV:  No pitting edema of BLE.      Vital Signs:  /82 (BP Location: Right arm, Patient Position: Sitting, Cuff Size: Adult Large)   Temp (!) 96.2  F (35.7  C) (Temporal)   Ht 5' 4\" (1.626 m)   Wt 223 lb (101.2 kg)   BMI 38.28 kg/m      Neurological:  Awake  Alert  Oriented x 3  Motor exam:        IP Q DF PF EHL  R   5  5   5   5    5  L   5  5   5   5    5     Able to spontaneously move L/E bilaterally  Sensation intact throughout all L/E " dermatomes     Imaging:   Thoracic MRI 10/5/2021 with moderate bilateral foraminal stenosis at T9-T10.     A/P: thoracic radiculopathy. Dr. Burnette met with the patient today and due to PT and injection without much benefit and diagnostic T9 injection results, recommended surgery. Plan for thoracic laminotomy/foramenotomy with Dr. Burnette. She verbalized understanding and agreement.    Lynnette Membreno, [facility] Neurosurgery  [address] [place] [address]  Tel [phone]  Fax [phone]

## 2022-01-27 NOTE — LETTER
"    1/27/2022         RE: Hannah Krishna  [address]  [facility] [address]        Dear Colleague,    Thank you for referring your patient, Hannah Krishna, to the [facility] NEUROSURGERY [facility]IC [place]. Please see a copy of my visit note below.    Hannah Krishna is a 51 year old female who presents for:  Chief Complaint   Patient presents with     Neurologic Problem     Chronic R side throacic back pain         Initial Vitals:  /82 (BP Location: Right arm, Patient Position: Sitting, Cuff Size: Adult Large)   Temp (!) 96.2  F (35.7  C) (Temporal)   Ht 5' 4\" (1.626 m)   Wt 223 lb (101.2 kg)   BMI 38.28 kg/m   Estimated body mass index is 38.28 kg/m  as calculated from the following:    Height as of this encounter: 5' 4\" (1.626 m).    Weight as of this encounter: 223 lb (101.2 kg).. Body surface area is 2.14 meters squared. BP completed using cuff size: large  Moderate Pain (5)    Nursing Comments: Pain is a 5 out of 10. Pain is on right side mid back.     Abigail Jesus      [facility] Neurosurgery  Neurosurgery Follow Up:    HPI: Hannah Krishna is a 51 year old female presents for a follow up in regards to her right-sided mid back pain. She underwent a TESI with trigger point injections with 3 days of good relief. She states she continues to have the same pain as prior. She has undergone chiropractic therapy without much sustainable benefit. Her chiropractor is managing her work restrictions. She states the pain is aggravated by touch and ROM in the T10-L2 region.     She presents for a phone visit for follow up. She reports she was told to follow up routinely after the RFA procedure. She notes significant improvement in pre procedure. She has some muscle pain at the location of the sites. She states she is ready to go back to work. She had prior restrictions from her chiropractor for 4 hours per day until the end of February. He is deferring further restrictions to our office.     She " "presents for a follow up. She underwent a diagnostic and therapeutic right T9 nerve root injection with Dr. Gibbons on 1/21/2022. She states she had 2 days of complete resolution of symptoms. Since 1/24/2022 the symptoms have returned. She reports right-sided mid back pain that radiates to her right side. She denies any leg symptoms.     Medical, surgical, family, and social history unchanged since prior exam.  Exam:  Constitutional:  Alert, well nourished, NAD.  HEENT: Normocephalic, atraumatic.   Pulm:  Without shortness of breath   CV:  No pitting edema of BLE.      Vital Signs:  /82 (BP Location: Right arm, Patient Position: Sitting, Cuff Size: Adult Large)   Temp (!) 96.2  F (35.7  C) (Temporal)   Ht 5' 4\" (1.626 m)   Wt 223 lb (101.2 kg)   BMI 38.28 kg/m      Neurological:  Awake  Alert  Oriented x 3  Motor exam:        IP Q DF PF EHL  R   5  5   5   5    5  L   5  5   5   5    5     Able to spontaneously move L/E bilaterally  Sensation intact throughout all L/E dermatomes     Imaging:   Thoracic MRI 10/5/2021 with moderate bilateral foraminal stenosis at T9-T10.     A/P: thoracic radiculopathy. Dr. Burnette met with the patient today and due to PT and injection without much benefit and diagnostic T9 injection results, recommended surgery. Plan for thoracic laminotomy/foramenotomy with Dr. Burnette. She verbalized understanding and agreement.    Lynnette Membreno CNP  [facility] Neurosurgery  [address] [place] [address]  Tel [phone]  Fax [phone]      I have seen and examined the patient with Lynnette Membreno NP and agree with the attached note.      Again, thank you for allowing me to participate in the care of your patient.        Sincerely,        Lynnette Membreno NP    "

## 2022-01-27 NOTE — PROGRESS NOTES
"Hannah Krishna is a 51 year old female who presents for:  Chief Complaint   Patient presents with     Neurologic Problem     Chronic R side throacic back pain         Initial Vitals:  /82 (BP Location: Right arm, Patient Position: Sitting, Cuff Size: Adult Large)   Temp (!) 96.2  F (35.7  C) (Temporal)   Ht 5' 4\" (1.626 m)   Wt 223 lb (101.2 kg)   BMI 38.28 kg/m   Estimated body mass index is 38.28 kg/m  as calculated from the following:    Height as of this encounter: 5' 4\" (1.626 m).    Weight as of this encounter: 223 lb (101.2 kg).. Body surface area is 2.14 meters squared. BP completed using cuff size: large  Moderate Pain (5)    Nursing Comments: Pain is a 5 out of 10. Pain is on right side mid back.     Abigail Jesus    "

## 2022-01-27 NOTE — PATIENT INSTRUCTIONS
Patient Instructions    Surgery scheduled at [facility] for  T9-10 right laminotomy foraminotomy with Dr. Burnette    Pre-Operative    Surgical risks: blood clots in the leg or lung, problems urinating, nerve damage, drainage from the incision, infection, stiffness    Pre-operative physical with primary care physician within 30 days of surgical date.     This is a same day procedure with discharge home day of surgery, may stay for 23 hour observation hospitalization for monitoring.       Shower procedure  o Please shower with antimicrobial soap the night before surgery and morning of surgery. Please refer to showering instruction sheet in folder.    Eating/Drinking  o Stop all solid foods 8 hours before surgery.  o Keep drinking clear liquids until 4 hours before surgery  - Clear liquids include water, clear juice, black coffee, or clear tea without milk, Gatorade, clear soda.     Medications  o Hold Aspirin, NSAIDs (Advil/Ibuprofen, Indocin, Naproxen,Nuprin,Relafen/Nabumetone, Diclofenac,Meloxicam, Aleve, Celebrex) x 7 days prior to surgical date  o You can take Tylenol (Acetaminophen) for pain, 1000 mg. Do not exceed 3,000 mg per day.   o Any other medications prescribed, please discuss with your primary care provider at your pre-operative physical     COVID Testing As part of preparation for your upcoming procedure you are required to have a test for the novel Coronavirus, COVID-19  o The test needs to be completed within 4 days (96) hours of surgery.   o Please call the drive-up testing number to schedule your appointment.   o Scheduling Number: [phone]     You may NOT receive the COVID-19 vaccine 72 hours before or after surgery.    Post-Operative    Pain Management    Dealing with pain  o As your body heals, you might feel a stabbing, burning, or aching pain. You may also have some numbness.  o Everyone feels pain differently, we may ask you to rate your pain using a  pain scale. This will let us know how much pain you feel.   o Keep in mind that medicine won't take away all of your pain. It helps to try other ways to relax and ease pain.     Things to help with pain  o After surgery, we will give you medicine for your pain. These medications work well, but they can make you drowsy, itchy, or sick to your stomach. If we give you narcotics for pain, try to take the pills with food.   o For mild to moderate pain, you can take medication such as Tylenol. These can be used with narcotics, but make sure that your narcotic does not contain Tylenol.   - Do NOT drive while taking narcotic pain medication  - Do NOT drink alcohol while using any pain medication  o You can utilize ice as needed (no longer than 20 minutes at one time)    Refills: please call the neurosurgery clinic to request a few days before you run out    Ibuprofen: You may resume taking NSAIDs (ex. Ibuprofen, aleve, naproxen) 2 weeks after surgery as it may cause bleeding and interfere with bone healing     Incision Care  o Look at your incision site every day. You  may need a mirror or family member to help you.   o Watch for signs of infection  - Redness, swelling, warmth, drainage (Green or yellow drainage (pus) from your incision or increased bloody drainage), and fever of 101 degrees or higher  - [facility] [phone]  o Remove dressing as instructed upon discharge  o Do not apply lotions or ointments to incision  o It is okay to shower, just pat the incision dry   o No submerging incision in water such as pools, hot tubs, baths for at least 8 weeks or until incision is healed    Bowel Care  o Many people have constipation (hard stools) after surgery.  To help prevent constipation: Drink plenty of fluid (8-10 glasses/day); Eat more fiber, such as whole grain bread, bran cereal, and fruits and vegetables; Stay active by walking; Over the counter stool softener may also help.      Activity Restrictions  o For the  first 6 weeks, no lifting > 10 pounds, limited bending, twisting, or overhead reaching.  o Take stairs in moderation     Walking: Walking is the best way to start exercise after surgery. Take short frequent walks. You may gradually increase the distance as tolerated. If you feel pain, decrease your activity, but DO NOT stop walking. Walking will help you gain strength, prevent muscle soreness and spasms, and prevent blood clots.  o Avoid bed rest and prolonged sitting for longer than 30 minutes (change positions frequently while awake)  o No contact sports until after follow up visit  o No high impact activities such as; running/jogging, snowmobile or 4 noguera riding or any other recreational vehicles      Contact clinic right away if you develop:     Infection    New injury    Bladder or bowel changes or loss of control      Signs of blood clot:  o Swelling and/or warmth in one or both legs  o Pain or tenderness in your leg, ankle, foot, or arm   o Red or discolored skin       Go to the Emergency Room   o If sudden onset of severe headache, weakness, confusion, change in level of consciousness, pain, or loss of movement.  o Chest pain  o Trouble breathing     Post-Op Follow Up Appointments    2 week incision check & staple/suture removal with RN    6 week post op visit with Physican Assistant or Nurse Practitioner     3 months post op with Physical Assistant or Nurse Practitioner       Resources    If you are currently employed, you will need to be off work for 2-4 weeks for post op recovery and healing.    Please fax any FMLA/short term disability paperwork to [phone] prior to surgery    You may call our clinic when you'd like to return to work and we can provide a work letter    To allow staff adequate time to complete paperwork, please send as soon as possible   CORBY Dorsey   [facility]  [facility] [place] [address]       [place] [address]  Telephone:  [phone]       Fax:  [phone]

## 2022-01-27 NOTE — TELEPHONE ENCOUNTER
Pt's CRC called to ask if they can get a work letter excusing her from work now until her surgery. Please call Meghan at [phone]. She also gave her fax number which is [phone]. Thank you~

## 2022-01-31 NOTE — TELEPHONE ENCOUNTER
Dr. Burnette approved patient off work now through 2-4 weeks post-op. Letter created.  BRYAN with Meghan indicating that our office will fax the letter by the end of today.    Sunita Estrada RN on 1/31/2022 at 9:54 AM

## 2022-01-31 NOTE — TELEPHONE ENCOUNTER
Faxed work letter January 31, 2022 to fax number [phone]     Right Fax confirmed at [phone]  AM

## 2022-02-03 ENCOUNTER — TELEPHONE (OUTPATIENT)
Dept: NEUROSURGERY | Facility: CLINIC | Age: 52
End: 2022-02-03
Payer: COMMERCIAL

## 2022-02-03 NOTE — TELEPHONE ENCOUNTER
Received a clarification letter from [facility] about the patient returning to work with restrictions.  Dr Burnette had approved the patient to remain off work now until 4 weeks after her surgery.     The patient said she has not been able to work with the current restrictions.  She had expressed this to Dr Burnette and she would be off work until after surgery.    2/3/22 Form scanned.   Waiting for signature.

## 2022-02-08 ENCOUNTER — ANCILLARY PROCEDURE (OUTPATIENT)
Dept: GENERAL RADIOLOGY | Facility: OTHER | Age: 52
End: 2022-02-08
Attending: NURSE PRACTITIONER
Payer: OTHER MISCELLANEOUS

## 2022-02-08 ENCOUNTER — TELEPHONE (OUTPATIENT)
Dept: NEUROSURGERY | Facility: CLINIC | Age: 52
End: 2022-02-08
Payer: COMMERCIAL

## 2022-02-08 ENCOUNTER — OFFICE VISIT (OUTPATIENT)
Dept: FAMILY MEDICINE | Facility: OTHER | Age: 52
End: 2022-02-08
Payer: OTHER MISCELLANEOUS

## 2022-02-08 VITALS
SYSTOLIC BLOOD PRESSURE: 130 MMHG | HEIGHT: 64 IN | HEART RATE: 68 BPM | WEIGHT: 215.5 LBS | OXYGEN SATURATION: 97 % | TEMPERATURE: 97.8 F | DIASTOLIC BLOOD PRESSURE: 88 MMHG | BODY MASS INDEX: 36.79 KG/M2 | RESPIRATION RATE: 14 BRPM

## 2022-02-08 DIAGNOSIS — G89.29 CHRONIC RIGHT-SIDED THORACIC BACK PAIN: ICD-10-CM

## 2022-02-08 DIAGNOSIS — M54.6 CHRONIC RIGHT-SIDED THORACIC BACK PAIN: ICD-10-CM

## 2022-02-08 DIAGNOSIS — Z01.818 PREOP GENERAL PHYSICAL EXAM: Primary | ICD-10-CM

## 2022-02-08 DIAGNOSIS — I10 BENIGN ESSENTIAL HYPERTENSION: ICD-10-CM

## 2022-02-08 DIAGNOSIS — F33.1 MAJOR DEPRESSIVE DISORDER, RECURRENT EPISODE, MODERATE (H): ICD-10-CM

## 2022-02-08 PROCEDURE — 72080 X-RAY EXAM THORACOLMB 2/> VW: CPT | Performed by: RADIOLOGY

## 2022-02-08 PROCEDURE — 99214 OFFICE O/P EST MOD 30 MIN: CPT | Performed by: FAMILY MEDICINE

## 2022-02-08 ASSESSMENT — ANXIETY QUESTIONNAIRES
7. FEELING AFRAID AS IF SOMETHING AWFUL MIGHT HAPPEN: NOT AT ALL
5. BEING SO RESTLESS THAT IT IS HARD TO SIT STILL: NOT AT ALL
GAD7 TOTAL SCORE: 0
1. FEELING NERVOUS, ANXIOUS, OR ON EDGE: NOT AT ALL
3. WORRYING TOO MUCH ABOUT DIFFERENT THINGS: NOT AT ALL
2. NOT BEING ABLE TO STOP OR CONTROL WORRYING: NOT AT ALL
GAD7 TOTAL SCORE: 0
4. TROUBLE RELAXING: NOT AT ALL
7. FEELING AFRAID AS IF SOMETHING AWFUL MIGHT HAPPEN: NOT AT ALL
GAD7 TOTAL SCORE: 0
6. BECOMING EASILY ANNOYED OR IRRITABLE: NOT AT ALL

## 2022-02-08 ASSESSMENT — PATIENT HEALTH QUESTIONNAIRE - PHQ9
10. IF YOU CHECKED OFF ANY PROBLEMS, HOW DIFFICULT HAVE THESE PROBLEMS MADE IT FOR YOU TO DO YOUR WORK, TAKE CARE OF THINGS AT HOME, OR GET ALONG WITH OTHER PEOPLE: NOT DIFFICULT AT ALL
SUM OF ALL RESPONSES TO PHQ QUESTIONS 1-9: 1
SUM OF ALL RESPONSES TO PHQ QUESTIONS 1-9: 1

## 2022-02-08 ASSESSMENT — MIFFLIN-ST. JEOR: SCORE: 1577.5

## 2022-02-08 ASSESSMENT — PAIN SCALES - GENERAL: PAINLEVEL: MODERATE PAIN (5)

## 2022-02-08 NOTE — TELEPHONE ENCOUNTER
Spoke with Dennis in radiology dept. Clarified with Luis Membreno CNP that XR should be thoracolumbar. Provided verbal and he will change the order.

## 2022-02-08 NOTE — H&P (VIEW-ONLY)
[address]  [facility] [address]  Phone: [phone]  Primary Provider: Merlene Le  Pre-op Performing Provider: MERLENE LE      PREOPERATIVE EVALUATION:  Today's date: 2/8/2022    Hannah Krishna is a 52 year old female who presents for a preoperative evaluation.    Surgical Information:  Surgery/Procedure: Right Thoracic 9 to Thoracic 10 laminotomy/foraminotomy  Surgery Location:  OR  Surgeon: Red Burnette  Surgery Date: 03/02/2022  Time of Surgery: 2pm  Where patient plans to recover: At home with family  Fax number for surgical facility: Note does not need to be faxed, will be available electronically in Epic.    Type of Anesthesia Anticipated: to be determined    Assessment & Plan     The proposed surgical procedure is considered INTERMEDIATE risk.    Preop general physical exam    Chronic right-sided thoracic back pain    Benign essential hypertension  Well controlled    Major depressive disorder, recurrent episode, moderate (H)  Stable.     Risks and Recommendations:  The patient has the following additional risks and recommendations for perioperative complications:   - No identified additional risk factors other than previously addressed    Medication Instructions:   - ibuprofen (Advil, Motrin): HOLD 1 day before surgery.     RECOMMENDATION:  APPROVAL GIVEN to proceed with proposed procedure, without further diagnostic evaluation.        Subjective     HPI related to upcoming procedure: right sided mid back pain radiating to the right side for the last 6 months, failed conservative treatment      Preop Questions 2/8/2022   1. Have you ever had a heart attack or stroke? No   2. Have you ever had surgery on your heart or blood vessels, such as a stent placement, a coronary artery bypass, or surgery on an artery in your head, neck, heart, or legs? No   3. Do you have chest pain with activity? No   4. Do you have a history of  heart  failure? No   5. Do you currently have a cold, bronchitis or symptoms of other infection? No   6. Do you have a cough, shortness of breath, or wheezing? No   7. Do you or anyone in your family have previous history of blood clots? No   8. Do you or does anyone in your family have a serious bleeding problem such as prolonged bleeding following surgeries or cuts? No   9. Have you ever had problems with anemia or been told to take iron pills? No   10. Have you had any abnormal blood loss such as black, tarry or bloody stools, or abnormal vaginal bleeding? No   11. Have you ever had a blood transfusion? No   12. Are you willing to have a blood transfusion if it is medically needed before, during, or after your surgery? Yes   13. Have you or any of your relatives ever had problems with anesthesia? No   14. Do you have sleep apnea, excessive snoring or daytime drowsiness? No   15. Do you have any artifical heart valves or other implanted medical devices like a pacemaker, defibrillator, or continuous glucose monitor? No   16. Do you have artificial joints? No   17. Are you allergic to latex? No   18. Is there any chance that you may be pregnant? No     Health Care /Directive:  Patient does /not have a Health Care Directive or Living Will: Discussed advance care planning with patient; information given to patient to review.    Preoperative Review of :   reviewed - no record of controlled substances prescribed.      Status of Chronic Conditions:  DEPRESSION - Patient has a long history of Depression of moderate severity requiring medication for control with recent symptoms being stable.    HYPERTENSION - Patient has longstanding history of HTN , currently denies any symptoms referable to elevated blood pressure. Specifically denies chest pain, palpitations, dyspnea, orthopnea, PND or peripheral edema. Blood pressure readings have been in normal range. Current medication regimen is as listed below. Patient denies any  side effects of medication.       Review of Systems  CONSTITUTIONAL: NEGATIVE for fever, chills, change in weight  INTEGUMENTARY/SKIN: NEGATIVE for worrisome rashes, moles or lesions  EYES: NEGATIVE for vision changes or irritation  ENT/MOUTH: NEGATIVE for ear, mouth and throat problems  RESP: NEGATIVE for significant cough or shortness of breath   CV: NEGATIVE for chest pain, palpitations or peripheral edema  GI: NEGATIVE for nausea, abdominal pain, heartburn, or change in bowel habits  : NEGATIVE for frequency, dysuria, or hematuria  MUSCULOSKELETAL: chronic right sided thoracic pain  NEURO: NEGATIVE for weakness, dizziness or paresthesias  ENDOCRINE: NEGATIVE for temperature intolerance, skin/hair changes  HEME: NEGATIVE for bleeding problems  PSYCHIATRIC: NEGATIVE for changes in mood or affect    Patient Active Problem List    Diagnosis Date Noted     Chronic right-sided thoracic back pain 01/26/2021     Priority: Medium     Fusion of spine of cervical region 01/10/2020     Priority: Medium     IUD (intrauterine device) in place 04/09/2018     Priority: Medium     Mirena placed 4/9/18 for abnormal uterine bleeding.  Due for removal April 2023.         Anxiety 07/31/2013     Priority: Medium     Benign essential hypertension 07/17/2013     Priority: Medium     Major depressive disorder, recurrent episode, moderate (H) 12/12/2011     Priority: Medium     Severe obesity (BMI 35.0-39.9) with comorbidity (H) 04/11/2001     Priority: Medium      Past Medical History:   Diagnosis Date     Chest pain, unspecified 11/2005    atypical - stress echo neg     Chronic depressive personality disorder      Esophageal reflux 12/11/2013     Headache 7/31/2013     Hypertension      Lateral epicondylitis 7/28/2015     Malaise and fatigue 8/1/2005     Obesity, unspecified      Other malaise and fatigue 8/2005     Polycystic ovaries     per pt     Sleep disturbance, unspecified 10/2005    see sleep study     Unspecified disorder of  thyroid 9/2005    thyroid nodule. I-123 WNL.     Past Surgical History:   Procedure Laterality Date     ARTHROSCOPY SHOULDER BICEPS TENODESIS REPAIR Right 4/8/2016    Procedure: ARTHROSCOPY SHOULDER BICEPS TENODESIS REPAIR;  Surgeon: Gilbert Cortes DO;  Location: [facility]     ARTHROSCOPY SHOULDER DECOMPRESSION Right 4/8/2016    Procedure: ARTHROSCOPY SHOULDER DECOMPRESSION;  Surgeon: Gilbert Cortes DO;  [facility]     ARTHROSCOPY SHOULDER DISTAL CLAVICLE REPAIR Right 4/8/2016    Procedure: ARTHROSCOPY SHOULDER DISTAL CLAVICLE RESECTION;  Surgeon: Giblert Cortes DO;  Location: [facility]     ARTHROSCOPY SHOULDER, OPEN ROTATOR CUFF REPAIR, COMBINED Right 4/8/2016    Procedure: COMBINED ARTHROSCOPY SHOULDER, OPEN ROTATOR CUFF REPAIR;  Surgeon: Gilbert Cortes DO;  [facility]     BUNIONECTOMY  10/24/2013    Procedure: BUNIONECTOMY;;  Surgeon: Ariel Mcdermott DPM;  Location: [facility]     EXCISE EXOSTOSIS FOOT  10/24/2013    Procedure: EXCISE EXOSTOSIS FOOT;  left excision of accessory navicular, bunion repair with osteotomy, and navicular remodeling;  Surgeon: Ariel Mcdermott DPM;  Location: [facility]     EYE SURGERY       [facility] RAD RX IODINE 123 SODIUM IODIDE  UCI, UP  UCI  10/2005    I 123 thryoid -WNL     HEAD & NECK SURGERY      Why is this one highlighted on yes already??     INJECT BLOCK MEDIAL BRANCH CERVICAL/THORACIC/LUMBAR Right 12/4/2020    Procedure: 10,11,12 Medial Branch Block Thoracic and Lumbar-1, Right;  Surgeon: González Gibbons MD;  [facility]     INJECT BLOCK MEDIAL BRANCH CERVICAL/THORACIC/LUMBAR Right 12/29/2020    Procedure: Right thoracic 10,11,12 and lumbar 1 medial branch blocks;  Surgeon: González Gibbons MD;  [facility]     INJECT EPIDURAL THORACIC N/A 10/28/2021    Procedure: Right paramedian T9-10 interlaminar epidural steroid injection using fluoroscopic guidance with contrast dye;  Surgeon: González Gibbons MD;  [facility]     INJECT EPIDURAL TRANSFORAMINAL Right 1/21/2022    Procedure: Low volume diagnostic and therapeutic right Thoracic 9 nerve root(transforaminal) Injection with fluoroscopic guidance and contrast.;  Surgeon: Gonzláez Gibbons MD;  Location[facility]     INJECT TRIGGER POINT N/A 10/30/2020    Procedure: thoracic epidural injection T8-9 and trigger point injections thoracic;  Surgeon: González Gibbons MD;  [facility]     OSTEOTOMY FOOT  10/24/2013    Procedure: OSTEOTOMY FOOT;;  Surgeon: Ariel Mcdermott DPM;  [facility]     RADIO FREQUENCY ABLATION PULSED CERVICAL N/A 1/28/2021    Procedure: radiofrequency ablation thoracic 10, 11, 12 and lumabr 1 medial branches;  Surgeon: González Gibbons MD;  Location: [facility]     ZZC LIGATE FALLOPIAN TUBE,POSTPARTUM  1998    Tubal Ligation     [facility] NONSPECIFIC PROCEDURE  2002    kidney stones removed     [facility] NONSPECIFIC PROCEDURE  655347    lasik surgery     [facility] STRESS ECHO TEST NL  11/2005    neg (low probability of ischemic disease)     Current Outpatient Medications   Medication Sig Dispense Refill     buPROPion (WELLBUTRIN XL) 300 MG 24 hr tablet Take 1 tablet (300 mg) by mouth every morning 90 tablet 3     Cyanocobalamin (VITAMIN B12 PO)        escitalopram (LEXAPRO) 10 MG tablet Take 1 tablet (10 mg) by mouth daily 90 tablet 3     ibuprofen (ADVIL/MOTRIN) 200 MG capsule Take 2 capsule for pain       levonorgestrel (MIRENA) 20 MCG/24HR IUD 1 each by Intrauterine route once       metoprolol succinate ER (TOPROL-XL) 50 MG 24 hr tablet TAKE ONE TABLET BY MOUTH ONCE DAILY 90 tablet 3     Pyridoxine HCl (VITAMIN B-6 PO)        vitamin D3 (CHOLECALCIFEROL) 2000 units tablet Take 1 tablet by mouth daily       cyclobenzaprine (FLEXERIL) 5 MG tablet Take 1 tablet (5 mg) by mouth 3 times daily as needed for muscle spasms (Patient not taking: Reported on 2/8/2022) 30 tablet 1     fexofenadine (ALLEGRA) 180 MG tablet Take 180 mg by mouth daily (Patient not taking: Reported on  "2022)       hydrOXYzine (ATARAX) 25 MG tablet Take 1 tablet (25 mg) by mouth 3 times daily as needed for anxiety (Patient not taking: Reported on 2022) 90 tablet 0       Allergies   Allergen Reactions     No Known Drug Allergies         Social History     Tobacco Use     Smoking status: Former Smoker     Packs/day: 0.10     Years: 18.00     Pack years: 1.80     Types: Cigarettes     Quit date: 2014     Years since quittin.2     Smokeless tobacco: Never Used     Tobacco comment: since    Substance Use Topics     Alcohol use: Yes     Comment: Occasional       History   Drug Use No         Objective     /88 (BP Location: Right arm, Patient Position: Sitting, Cuff Size: Adult Large)   Pulse 68   Temp 97.8  F (36.6  C) (Oral)   Resp 14   Ht 1.626 m (5' 4\")   Wt 97.8 kg (215 lb 8 oz)   LMP 2022 (Approximate)   SpO2 97%   BMI 36.99 kg/m      Physical Exam    GENERAL APPEARANCE: healthy, alert and no distress     EYES: EOMI, PERRL     HENT: ear canals and TM's normal and nose and mouth without ulcers or lesions     NECK: no adenopathy, no asymmetry, masses, or scars and thyroid normal to palpation     RESP: lungs clear to auscultation - no rales, rhonchi or wheezes     CV: regular rates and rhythm, normal S1 S2, no S3 or S4 and no murmur, click or rub     ABDOMEN:  soft, nontender, no HSM or masses and bowel sounds normal     MS: extremities normal- no gross deformities noted, no evidence of inflammation in joints, FROM in all extremities.     SKIN: no suspicious lesions or rashes     NEURO: Normal strength and tone, sensory exam grossly normal, mentation intact and speech normal     PSYCH: mentation appears normal. and affect normal/bright     LYMPHATICS: No cervical adenopathy    Recent Labs   Lab Test 21  1218   HGB 12.5         POTASSIUM 3.8   CR 0.98        Diagnostics:  No labs were ordered during this visit.   No EKG required, no history of coronary heart " disease, significant arrhythmia, peripheral arterial disease or other structural heart disease.    Revised Cardiac Risk Index (RCRI):  The patient has the following serious cardiovascular risks for perioperative complications:   - No serious cardiac risks = 0 points     RCRI Interpretation: 0 points: Class I (very low risk - 0.4% complication rate)           Signed Electronically by: Merlene Paz MD  Copy of this evaluation report is provided to requesting physician.

## 2022-02-08 NOTE — TELEPHONE ENCOUNTER
Reason for Call:  Other appointment    Detailed comments: Dennis from [facility] calling to say that Lynnette Valentine ordered an MRI for patient but they are not sure whether it should be thoracic or lumbar spine - patient has an appointment this morning and would need this clarified ASAP. Please call Dennis at [phone]    Phone Number Patient can be reached at:     Best Time: any    Can we leave a detailed message on this number? YES    Call taken on 2/8/2022 at 9:37 AM by Sunita Olea

## 2022-02-08 NOTE — PROGRESS NOTES
[address]  [facility] [address]  Phone: [phone]  Primary Provider: Merlene Le  Pre-op Performing Provider: MERLENE LE      PREOPERATIVE EVALUATION:  Today's date: 2/8/2022    Hannah Krishna is a 52 year old female who presents for a preoperative evaluation.    Surgical Information:  Surgery/Procedure: Right Thoracic 9 to Thoracic 10 laminotomy/foraminotomy  Surgery Location:  OR  Surgeon: Red Burnette  Surgery Date: 03/02/2022  Time of Surgery: 2pm  Where patient plans to recover: At home with family  Fax number for surgical facility: Note does not need to be faxed, will be available electronically in Epic.    Type of Anesthesia Anticipated: to be determined    Assessment & Plan     The proposed surgical procedure is considered INTERMEDIATE risk.    Preop general physical exam    Chronic right-sided thoracic back pain    Benign essential hypertension  Well controlled    Major depressive disorder, recurrent episode, moderate (H)  Stable.     Risks and Recommendations:  The patient has the following additional risks and recommendations for perioperative complications:   - No identified additional risk factors other than previously addressed    Medication Instructions:   - ibuprofen (Advil, Motrin): HOLD 1 day before surgery.     RECOMMENDATION:  APPROVAL GIVEN to proceed with proposed procedure, without further diagnostic evaluation.        Subjective     HPI related to upcoming procedure: right sided mid back pain radiating to the right side for the last 6 months, failed conservative treatment      Preop Questions 2/8/2022   1. Have you ever had a heart attack or stroke? No   2. Have you ever had surgery on your heart or blood vessels, such as a stent placement, a coronary artery bypass, or surgery on an artery in your head, neck, heart, or legs? No   3. Do you have chest pain with activity? No   4. Do you have a history of  heart  failure? No   5. Do you currently have a cold, bronchitis or symptoms of other infection? No   6. Do you have a cough, shortness of breath, or wheezing? No   7. Do you or anyone in your family have previous history of blood clots? No   8. Do you or does anyone in your family have a serious bleeding problem such as prolonged bleeding following surgeries or cuts? No   9. Have you ever had problems with anemia or been told to take iron pills? No   10. Have you had any abnormal blood loss such as black, tarry or bloody stools, or abnormal vaginal bleeding? No   11. Have you ever had a blood transfusion? No   12. Are you willing to have a blood transfusion if it is medically needed before, during, or after your surgery? Yes   13. Have you or any of your relatives ever had problems with anesthesia? No   14. Do you have sleep apnea, excessive snoring or daytime drowsiness? No   15. Do you have any artifical heart valves or other implanted medical devices like a pacemaker, defibrillator, or continuous glucose monitor? No   16. Do you have artificial joints? No   17. Are you allergic to latex? No   18. Is there any chance that you may be pregnant? No     Health Care /Directive:  Patient does /not have a Health Care Directive or Living Will: Discussed advance care planning with patient; information given to patient to review.    Preoperative Review of :   reviewed - no record of controlled substances prescribed.      Status of Chronic Conditions:  DEPRESSION - Patient has a long history of Depression of moderate severity requiring medication for control with recent symptoms being stable.    HYPERTENSION - Patient has longstanding history of HTN , currently denies any symptoms referable to elevated blood pressure. Specifically denies chest pain, palpitations, dyspnea, orthopnea, PND or peripheral edema. Blood pressure readings have been in normal range. Current medication regimen is as listed below. Patient denies any  side effects of medication.       Review of Systems  CONSTITUTIONAL: NEGATIVE for fever, chills, change in weight  INTEGUMENTARY/SKIN: NEGATIVE for worrisome rashes, moles or lesions  EYES: NEGATIVE for vision changes or irritation  ENT/MOUTH: NEGATIVE for ear, mouth and throat problems  RESP: NEGATIVE for significant cough or shortness of breath   CV: NEGATIVE for chest pain, palpitations or peripheral edema  GI: NEGATIVE for nausea, abdominal pain, heartburn, or change in bowel habits  : NEGATIVE for frequency, dysuria, or hematuria  MUSCULOSKELETAL: chronic right sided thoracic pain  NEURO: NEGATIVE for weakness, dizziness or paresthesias  ENDOCRINE: NEGATIVE for temperature intolerance, skin/hair changes  HEME: NEGATIVE for bleeding problems  PSYCHIATRIC: NEGATIVE for changes in mood or affect    Patient Active Problem List    Diagnosis Date Noted     Chronic right-sided thoracic back pain 01/26/2021     Priority: Medium     Fusion of spine of cervical region 01/10/2020     Priority: Medium     IUD (intrauterine device) in place 04/09/2018     Priority: Medium     Mirena placed 4/9/18 for abnormal uterine bleeding.  Due for removal April 2023.         Anxiety 07/31/2013     Priority: Medium     Benign essential hypertension 07/17/2013     Priority: Medium     Major depressive disorder, recurrent episode, moderate (H) 12/12/2011     Priority: Medium     Severe obesity (BMI 35.0-39.9) with comorbidity (H) 04/11/2001     Priority: Medium      Past Medical History:   Diagnosis Date     Chest pain, unspecified 11/2005    atypical - stress echo neg     Chronic depressive personality disorder      Esophageal reflux 12/11/2013     Headache 7/31/2013     Hypertension      Lateral epicondylitis 7/28/2015     Malaise and fatigue 8/1/2005     Obesity, unspecified      Other malaise and fatigue 8/2005     Polycystic ovaries     per pt     Sleep disturbance, unspecified 10/2005    see sleep study     Unspecified disorder of  thyroid 9/2005    thyroid nodule. I-123 WNL.     Past Surgical History:   Procedure Laterality Date     ARTHROSCOPY SHOULDER BICEPS TENODESIS REPAIR Right 4/8/2016    Procedure: ARTHROSCOPY SHOULDER BICEPS TENODESIS REPAIR;  Surgeon: Gilbert Cortes DO;  Location[facility]     ARTHROSCOPY SHOULDER DECOMPRESSION Right 4/8/2016    Procedure: ARTHROSCOPY SHOULDER DECOMPRESSION;  Surgeon: Gilbert Cortes DO;  [facility]     ARTHROSCOPY SHOULDER DISTAL CLAVICLE REPAIR Right 4/8/2016    Procedure: ARTHROSCOPY SHOULDER DISTAL CLAVICLE RESECTION;  Surgeon: Gilbert Cortes DO;  [facility]     ARTHROSCOPY SHOULDER, OPEN ROTATOR CUFF REPAIR, COMBINED Right 4/8/2016    Procedure: COMBINED ARTHROSCOPY SHOULDER, OPEN ROTATOR CUFF REPAIR;  Surgeon: Gilbert Cortes DO;  [facility]     BUNIONECTOMY  10/24/2013    Procedure: BUNIONECTOMY;;  Surgeon: Ariel Mcdermott DPM;  [facility]     EXCISE EXOSTOSIS FOOT  10/24/2013    Procedure: EXCISE EXOSTOSIS FOOT;  left excision of accessory navicular, bunion repair with osteotomy, and navicular remodeling;  Surgeon: Ariel Mcdermott DPM;  Location: [facility]     EYE SURGERY       [facility] RAD RX IODINE 123 SODIUM IODIDE  UCI, UP  UCI  10/2005    I 123 thryoid -WNL     HEAD & NECK SURGERY      Why is this one highlighted on yes already??     INJECT BLOCK MEDIAL BRANCH CERVICAL/THORACIC/LUMBAR Right 12/4/2020    Procedure: 10,11,12 Medial Branch Block Thoracic and Lumbar-1, Right;  Surgeon: González Gibbons MD;  [facility]     INJECT BLOCK MEDIAL BRANCH CERVICAL/THORACIC/LUMBAR Right 12/29/2020    Procedure: Right thoracic 10,11,12 and lumbar 1 medial branch blocks;  Surgeon: González Gibbons MD;  [facility]     INJECT EPIDURAL THORACIC N/A 10/28/2021    Procedure: Right paramedian T9-10 interlaminar epidural steroid injection using fluoroscopic guidance with contrast dye;  Surgeon: González Gibbons MD;  [facility]     INJECT EPIDURAL TRANSFORAMINAL Right 1/21/2022    Procedure: Low volume diagnostic and therapeutic right Thoracic 9 nerve root(transforaminal) Injection with fluoroscopic guidance and contrast.;  Surgeon: González Gibbons MD;  [facility]     INJECT TRIGGER POINT N/A 10/30/2020    Procedure: thoracic epidural injection T8-9 and trigger point injections thoracic;  Surgeon: González Gibbons MD;  [facility]     OSTEOTOMY FOOT  10/24/2013    Procedure: OSTEOTOMY FOOT;;  Surgeon: Ariel Mcdermott DPM;  [facility]     RADIO FREQUENCY ABLATION PULSED CERVICAL N/A 1/28/2021    Procedure: radiofrequency ablation thoracic 10, 11, 12 and lumabr 1 medial branches;  Surgeon: González Gibbons MD;  [facility]     ZZC LIGATE FALLOPIAN TUBE,POSTPARTUM  1998    Tubal Ligation     [facility] NONSPECIFIC PROCEDURE  2002    kidney stones removed     [facility] NONSPECIFIC PROCEDURE  [id number]    lasik surgery     [facility] STRESS ECHO TEST NL  11/2005    neg (low probability of ischemic disease)     Current Outpatient Medications   Medication Sig Dispense Refill     buPROPion (WELLBUTRIN XL) 300 MG 24 hr tablet Take 1 tablet (300 mg) by mouth every morning 90 tablet 3     Cyanocobalamin (VITAMIN B12 PO)        escitalopram (LEXAPRO) 10 MG tablet Take 1 tablet (10 mg) by mouth daily 90 tablet 3     ibuprofen (ADVIL/MOTRIN) 200 MG capsule Take 2 capsule for pain       levonorgestrel (MIRENA) 20 MCG/24HR IUD 1 each by Intrauterine route once       metoprolol succinate ER (TOPROL-XL) 50 MG 24 hr tablet TAKE ONE TABLET BY MOUTH ONCE DAILY 90 tablet 3     Pyridoxine HCl (VITAMIN B-6 PO)        vitamin D3 (CHOLECALCIFEROL) 2000 units tablet Take 1 tablet by mouth daily       cyclobenzaprine (FLEXERIL) 5 MG tablet Take 1 tablet (5 mg) by mouth 3 times daily as needed for muscle spasms (Patient not taking: Reported on 2/8/2022) 30 tablet 1     fexofenadine (ALLEGRA) 180 MG tablet Take 180 mg by mouth daily (Patient not taking: Reported on  "2022)       hydrOXYzine (ATARAX) 25 MG tablet Take 1 tablet (25 mg) by mouth 3 times daily as needed for anxiety (Patient not taking: Reported on 2022) 90 tablet 0       Allergies   Allergen Reactions     No Known Drug Allergies         Social History     Tobacco Use     Smoking status: Former Smoker     Packs/day: 0.10     Years: 18.00     Pack years: 1.80     Types: Cigarettes     Quit date: 2014     Years since quittin.2     Smokeless tobacco: Never Used     Tobacco comment: since    Substance Use Topics     Alcohol use: Yes     Comment: Occasional       History   Drug Use No         Objective     /88 (BP Location: Right arm, Patient Position: Sitting, Cuff Size: Adult Large)   Pulse 68   Temp 97.8  F (36.6  C) (Oral)   Resp 14   Ht 1.626 m (5' 4\")   Wt 97.8 kg (215 lb 8 oz)   LMP 2022 (Approximate)   SpO2 97%   BMI 36.99 kg/m      Physical Exam    GENERAL APPEARANCE: healthy, alert and no distress     EYES: EOMI, PERRL     HENT: ear canals and TM's normal and nose and mouth without ulcers or lesions     NECK: no adenopathy, no asymmetry, masses, or scars and thyroid normal to palpation     RESP: lungs clear to auscultation - no rales, rhonchi or wheezes     CV: regular rates and rhythm, normal S1 S2, no S3 or S4 and no murmur, click or rub     ABDOMEN:  soft, nontender, no HSM or masses and bowel sounds normal     MS: extremities normal- no gross deformities noted, no evidence of inflammation in joints, FROM in all extremities.     SKIN: no suspicious lesions or rashes     NEURO: Normal strength and tone, sensory exam grossly normal, mentation intact and speech normal     PSYCH: mentation appears normal. and affect normal/bright     LYMPHATICS: No cervical adenopathy    Recent Labs   Lab Test 21  1218   HGB 12.5         POTASSIUM 3.8   CR 0.98        Diagnostics:  No labs were ordered during this visit.   No EKG required, no history of coronary heart " disease, significant arrhythmia, peripheral arterial disease or other structural heart disease.    Revised Cardiac Risk Index (RCRI):  The patient has the following serious cardiovascular risks for perioperative complications:   - No serious cardiac risks = 0 points     RCRI Interpretation: 0 points: Class I (very low risk - 0.4% complication rate)           Signed Electronically by: Merlene Paz MD  Copy of this evaluation report is provided to requesting physician.

## 2022-02-08 NOTE — PATIENT INSTRUCTIONS
Preparing for Your Surgery  Getting started  A nurse will call you to review your health history and instructions. They will give you an arrival time based on your scheduled surgery time. Please be ready to share:    Your doctor's clinic name and phone number    Your medical, surgical and anesthesia history    A list of allergies and sensitivities    A list of medicines, including herbal treatments and over-the-counter drugs    Whether the patient has a legal guardian (ask how to send us the papers in advance)  Please tell us if you're pregnant--or if there's any chance you might be pregnant. Some surgeries may injure a fetus (unborn baby), so they require a pregnancy test. Surgeries that are safe for a fetus don't always need a test, and you can choose whether to have one.   If you have a child who's having surgery, please ask for a copy of Preparing for Your Child's Surgery.    Preparing for surgery    Within 30 days of surgery: Have a pre-op exam (sometimes called an H&P, or History and Physical). This can be done at a clinic or pre-operative center.  ? If you're having a , you may not need this exam. Talk to your care team.    At your pre-op exam, talk to your care team about all medicines you take. If you need to stop any medicines before surgery, ask when to start taking them again.  ? We do this for your safety. Many medicines can make you bleed too much during surgery. Some change how well surgery (anesthesia) drugs work.    Call your insurance company to let them know you're having surgery. (If you don't have insurance, call [phone].)    Call your clinic if there's any change in your health. This includes signs of a cold or flu (sore throat, runny nose, cough, rash, fever). It also includes a scrape or scratch near the surgery site.    If you have questions on the day of surgery, call your hospital or surgery center.  COVID testing  You may need to be tested for COVID-19 before having  surgery. If so, your surgical team will give you instructions for scheduling this test, separate from your preoperative history and physical.  Eating and drinking guidelines  For your safety: Unless your surgeon tells you otherwise, follow the guidelines below.    Eat and drink as usual until 8 hours before surgery. After that, no food or milk.    Drink clear liquids until 2 hours before surgery. These are liquids you can see through, like water, Gatorade and Propel Water. You may also have black coffee and tea (no cream or milk).    Nothing by mouth within 2 hours of surgery. This includes gum, candy and breath mints.    If you drink alcohol: Stop drinking it the night before surgery.    If your care team tells you to take medicine on the morning of surgery, it's okay to take it with a sip of water.  Preventing infection    Shower or bathe the night before and morning of your surgery. Follow the instructions your clinic gave you. (If no instructions, use regular soap.)    Don't shave or clip hair near your surgery site. We'll remove the hair if needed.    Don't smoke or vape the morning of surgery. You may chew nicotine gum up to 2 hours before surgery. A nicotine patch is okay.  ? Note: Some surgeries require you to completely quit smoking and nicotine. Check with your surgeon.    Your care team will make every effort to keep you safe from infection. We will:  ? Clean our hands often with soap and water (or an alcohol-based hand rub).  ? Clean the skin at your surgery site with a special soap that kills germs.  ? Give you a special gown to keep you warm. (Cold raises the risk of infection.)  ? Wear special hair covers, masks, gowns and gloves during surgery.  ? Give antibiotic medicine, if prescribed. Not all surgeries need antibiotics.  What to bring on the day of surgery    Photo ID and insurance card    Copy of your health care directive, if you have one    Glasses and hearing aides (bring cases)  ? You can't  wear contacts during surgery    Inhaler and eye drops, if you use them (tell us about these when you arrive)    CPAP machine or breathing device, if you use them    A few personal items, if spending the night    If you have . . .  ? A pacemaker, ICD (cardiac defibrillator) or other implant: Bring the ID card.  ? An implanted stimulator: Bring the remote control.  ? A legal guardian: Bring a copy of the certified (court-stamped) guardianship papers.  Please remove any jewelry, including body piercings. Leave jewelry and other valuables at home.  If you're going home the day of surgery    You must have a responsible adult drive you home. They should stay with you overnight as well.    If you don't have someone to stay with you, and you aren't safe to go home alone, we may keep you overnight. Insurance often won't pay for this.  After surgery  If it's hard to control your pain or you need more pain medicine, please call your surgeon's office.  Questions?   If you have any questions for your care team, list them here: _________________________________________________________________________________________________________________________________________________________________________ ____________________________________ ____________________________________ ____________________________________  For informational purposes only. Not to replace the advice of your health care provider. Copyright   2003, 2019 [facility]. All rights reserved. Clinically reviewed by Leatha Petty MD. [employer] [id number] - REV 07/21.    For your Mom---  Clinic Care Coordination Contact  [facility]/Voicemail        Clinical Data: Care Coordinator Outreach  Outreach attempted x 2.  Left message on daughter's voicemail with call back information and requested return call.  Plan:  Care Coordinator will route back to [facility] for attempt to reschedule.     EMMIE Moyer, Certified [employer] [facility] - Care Coordinator    11/8/2021   12:17 PM  [phone]

## 2022-02-09 DIAGNOSIS — Z11.59 ENCOUNTER FOR SCREENING FOR OTHER VIRAL DISEASES: Primary | ICD-10-CM

## 2022-02-09 ASSESSMENT — PATIENT HEALTH QUESTIONNAIRE - PHQ9: SUM OF ALL RESPONSES TO PHQ QUESTIONS 1-9: 1

## 2022-02-09 ASSESSMENT — ANXIETY QUESTIONNAIRES: GAD7 TOTAL SCORE: 0

## 2022-02-12 ENCOUNTER — HEALTH MAINTENANCE LETTER (OUTPATIENT)
Age: 52
End: 2022-02-12

## 2022-02-18 ENCOUNTER — APPOINTMENT (OUTPATIENT)
Dept: FAMILY MEDICINE | Facility: OTHER | Age: 52
End: 2022-02-18
Payer: COMMERCIAL

## 2022-02-21 ENCOUNTER — E-VISIT (OUTPATIENT)
Dept: FAMILY MEDICINE | Facility: OTHER | Age: 52
End: 2022-02-21
Payer: OTHER MISCELLANEOUS

## 2022-02-21 DIAGNOSIS — R51.9 ACUTE INTRACTABLE HEADACHE, UNSPECIFIED HEADACHE TYPE: Primary | ICD-10-CM

## 2022-02-21 PROCEDURE — 99421 OL DIG E/M SVC 5-10 MIN: CPT | Performed by: FAMILY MEDICINE

## 2022-02-22 RX ORDER — CYCLOBENZAPRINE HCL 10 MG
10 TABLET ORAL 3 TIMES DAILY PRN
Qty: 30 TABLET | Refills: 0 | Status: ON HOLD | OUTPATIENT
Start: 2022-02-22 | End: 2022-03-02

## 2022-02-26 ENCOUNTER — LAB (OUTPATIENT)
Dept: LAB | Facility: CLINIC | Age: 52
End: 2022-02-26
Attending: NEUROLOGICAL SURGERY
Payer: OTHER MISCELLANEOUS

## 2022-02-26 DIAGNOSIS — Z11.59 ENCOUNTER FOR SCREENING FOR OTHER VIRAL DISEASES: ICD-10-CM

## 2022-02-26 PROCEDURE — U0005 INFEC AGEN DETEC AMPLI PROBE: HCPCS

## 2022-02-26 PROCEDURE — U0003 INFECTIOUS AGENT DETECTION BY NUCLEIC ACID (DNA OR RNA); SEVERE ACUTE RESPIRATORY SYNDROME CORONAVIRUS 2 (SARS-COV-2) (CORONAVIRUS DISEASE [COVID-19]), AMPLIFIED PROBE TECHNIQUE, MAKING USE OF HIGH THROUGHPUT TECHNOLOGIES AS DESCRIBED BY CMS-2020-01-R: HCPCS

## 2022-02-27 LAB — SARS-COV-2 RNA RESP QL NAA+PROBE: NEGATIVE

## 2022-03-02 ENCOUNTER — HOSPITAL ENCOUNTER (OUTPATIENT)
Facility: CLINIC | Age: 52
Discharge: HOME | End: 2022-03-02
Attending: NEUROLOGICAL SURGERY | Admitting: NEUROLOGICAL SURGERY
Payer: OTHER MISCELLANEOUS

## 2022-03-02 ENCOUNTER — ANESTHESIA EVENT (OUTPATIENT)
Dept: SURGERY | Facility: CLINIC | Age: 52
End: 2022-03-02
Payer: OTHER MISCELLANEOUS

## 2022-03-02 ENCOUNTER — ANESTHESIA (OUTPATIENT)
Dept: SURGERY | Facility: CLINIC | Age: 52
End: 2022-03-02
Payer: OTHER MISCELLANEOUS

## 2022-03-02 ENCOUNTER — HOSPITAL ENCOUNTER (OUTPATIENT)
Dept: GENERAL RADIOLOGY | Facility: CLINIC | Age: 52
End: 2022-03-02
Attending: NEUROLOGICAL SURGERY
Payer: COMMERCIAL

## 2022-03-02 VITALS
DIASTOLIC BLOOD PRESSURE: 82 MMHG | BODY MASS INDEX: 36.9 KG/M2 | SYSTOLIC BLOOD PRESSURE: 122 MMHG | HEART RATE: 64 BPM | RESPIRATION RATE: 16 BRPM | TEMPERATURE: 96.9 F | OXYGEN SATURATION: 94 % | WEIGHT: 215 LBS

## 2022-03-02 DIAGNOSIS — Z98.890 S/P SPINAL SURGERY: Primary | ICD-10-CM

## 2022-03-02 DIAGNOSIS — R51.9 ACUTE INTRACTABLE HEADACHE, UNSPECIFIED HEADACHE TYPE: ICD-10-CM

## 2022-03-02 DIAGNOSIS — M54.14 THORACIC RADICULOPATHY: ICD-10-CM

## 2022-03-02 PROCEDURE — 258N000003 HC RX IP 258 OP 636: Performed by: NURSE ANESTHETIST, CERTIFIED REGISTERED

## 2022-03-02 PROCEDURE — 710N000010 HC RECOVERY PHASE 1, LEVEL 2, PER MIN: Performed by: NEUROLOGICAL SURGERY

## 2022-03-02 PROCEDURE — 250N000011 HC RX IP 250 OP 636: Performed by: NEUROLOGICAL SURGERY

## 2022-03-02 PROCEDURE — 250N000026 HC DESFLURANE, PER MIN: Performed by: NEUROLOGICAL SURGERY

## 2022-03-02 PROCEDURE — 250N000011 HC RX IP 250 OP 636: Performed by: PHYSICIAN ASSISTANT

## 2022-03-02 PROCEDURE — 63055 DECOMPRESS SPINAL CORD THRC: CPT | Mod: AS | Performed by: PHYSICIAN ASSISTANT

## 2022-03-02 PROCEDURE — 360N000077 HC SURGERY LEVEL 4, PER MIN: Performed by: NEUROLOGICAL SURGERY

## 2022-03-02 PROCEDURE — 63055 DECOMPRESS SPINAL CORD THRC: CPT | Performed by: NEUROLOGICAL SURGERY

## 2022-03-02 PROCEDURE — 69990 MICROSURGERY ADD-ON: CPT | Mod: 59 | Performed by: NEUROLOGICAL SURGERY

## 2022-03-02 PROCEDURE — 272N000001 HC OR GENERAL SUPPLY STERILE: Performed by: NEUROLOGICAL SURGERY

## 2022-03-02 PROCEDURE — 250N000025 HC SEVOFLURANE, PER MIN: Performed by: NEUROLOGICAL SURGERY

## 2022-03-02 PROCEDURE — 999N000141 HC STATISTIC PRE-PROCEDURE NURSING ASSESSMENT: Performed by: NEUROLOGICAL SURGERY

## 2022-03-02 PROCEDURE — 370N000017 HC ANESTHESIA TECHNICAL FEE, PER MIN: Performed by: NEUROLOGICAL SURGERY

## 2022-03-02 PROCEDURE — 250N000013 HC RX MED GY IP 250 OP 250 PS 637: Performed by: PHYSICIAN ASSISTANT

## 2022-03-02 PROCEDURE — 69990 MICROSURGERY ADD-ON: CPT | Mod: AS | Performed by: PHYSICIAN ASSISTANT

## 2022-03-02 PROCEDURE — 999N000179 XR SURGERY CARM FLUORO LESS THAN 5 MIN W STILLS: Mod: TC

## 2022-03-02 PROCEDURE — 250N000009 HC RX 250: Performed by: NEUROLOGICAL SURGERY

## 2022-03-02 PROCEDURE — 250N000011 HC RX IP 250 OP 636: Performed by: NURSE ANESTHETIST, CERTIFIED REGISTERED

## 2022-03-02 PROCEDURE — 710N000012 HC RECOVERY PHASE 2, PER MINUTE: Performed by: NEUROLOGICAL SURGERY

## 2022-03-02 PROCEDURE — 250N000009 HC RX 250: Performed by: NURSE ANESTHETIST, CERTIFIED REGISTERED

## 2022-03-02 RX ORDER — SODIUM CHLORIDE, SODIUM LACTATE, POTASSIUM CHLORIDE, CALCIUM CHLORIDE 600; 310; 30; 20 MG/100ML; MG/100ML; MG/100ML; MG/100ML
INJECTION, SOLUTION INTRAVENOUS CONTINUOUS
Status: DISCONTINUED | OUTPATIENT
Start: 2022-03-02 | End: 2022-03-02 | Stop reason: HOSPADM

## 2022-03-02 RX ORDER — SODIUM CHLORIDE, SODIUM LACTATE, POTASSIUM CHLORIDE, CALCIUM CHLORIDE 600; 310; 30; 20 MG/100ML; MG/100ML; MG/100ML; MG/100ML
INJECTION, SOLUTION INTRAVENOUS CONTINUOUS PRN
Status: DISCONTINUED | OUTPATIENT
Start: 2022-03-02 | End: 2022-03-02

## 2022-03-02 RX ORDER — HYDROXYZINE HYDROCHLORIDE 25 MG/1
25 TABLET, FILM COATED ORAL
Status: COMPLETED | OUTPATIENT
Start: 2022-03-02 | End: 2022-03-02

## 2022-03-02 RX ORDER — DEXAMETHASONE SODIUM PHOSPHATE 10 MG/ML
INJECTION, SOLUTION INTRAMUSCULAR; INTRAVENOUS PRN
Status: DISCONTINUED | OUTPATIENT
Start: 2022-03-02 | End: 2022-03-02

## 2022-03-02 RX ORDER — DIMENHYDRINATE 50 MG/ML
25 INJECTION, SOLUTION INTRAMUSCULAR; INTRAVENOUS
Status: DISCONTINUED | OUTPATIENT
Start: 2022-03-02 | End: 2022-03-02 | Stop reason: HOSPADM

## 2022-03-02 RX ORDER — PROPOFOL 10 MG/ML
INJECTION, EMULSION INTRAVENOUS PRN
Status: DISCONTINUED | OUTPATIENT
Start: 2022-03-02 | End: 2022-03-02

## 2022-03-02 RX ORDER — OXYCODONE HYDROCHLORIDE 5 MG/1
5-10 TABLET ORAL EVERY 4 HOURS PRN
Qty: 20 TABLET | Refills: 0 | Status: SHIPPED | OUTPATIENT
Start: 2022-03-02 | End: 2022-05-31

## 2022-03-02 RX ORDER — ONDANSETRON 4 MG/1
4 TABLET, ORALLY DISINTEGRATING ORAL EVERY 30 MIN PRN
Status: DISCONTINUED | OUTPATIENT
Start: 2022-03-02 | End: 2022-03-02 | Stop reason: HOSPADM

## 2022-03-02 RX ORDER — MEPERIDINE HYDROCHLORIDE 25 MG/ML
12.5 INJECTION INTRAMUSCULAR; INTRAVENOUS; SUBCUTANEOUS
Status: DISCONTINUED | OUTPATIENT
Start: 2022-03-02 | End: 2022-03-02 | Stop reason: HOSPADM

## 2022-03-02 RX ORDER — FENTANYL CITRATE 50 UG/ML
INJECTION, SOLUTION INTRAMUSCULAR; INTRAVENOUS PRN
Status: DISCONTINUED | OUTPATIENT
Start: 2022-03-02 | End: 2022-03-02

## 2022-03-02 RX ORDER — FENTANYL CITRATE 50 UG/ML
50 INJECTION, SOLUTION INTRAMUSCULAR; INTRAVENOUS EVERY 5 MIN PRN
Status: DISCONTINUED | OUTPATIENT
Start: 2022-03-02 | End: 2022-03-02 | Stop reason: HOSPADM

## 2022-03-02 RX ORDER — HYDROXYZINE HYDROCHLORIDE 10 MG/1
10 TABLET, FILM COATED ORAL
Status: DISCONTINUED | OUTPATIENT
Start: 2022-03-02 | End: 2022-03-02 | Stop reason: HOSPADM

## 2022-03-02 RX ORDER — METHYLPREDNISOLONE ACETATE 40 MG/ML
INJECTION, SUSPENSION INTRA-ARTICULAR; INTRALESIONAL; INTRAMUSCULAR; SOFT TISSUE PRN
Status: DISCONTINUED | OUTPATIENT
Start: 2022-03-02 | End: 2022-03-02 | Stop reason: HOSPADM

## 2022-03-02 RX ORDER — ONDANSETRON 4 MG/1
4 TABLET, ORALLY DISINTEGRATING ORAL
Status: DISCONTINUED | OUTPATIENT
Start: 2022-03-02 | End: 2022-03-02 | Stop reason: HOSPADM

## 2022-03-02 RX ORDER — LIDOCAINE HYDROCHLORIDE AND EPINEPHRINE 10; 10 MG/ML; UG/ML
INJECTION, SOLUTION INFILTRATION; PERINEURAL PRN
Status: DISCONTINUED | OUTPATIENT
Start: 2022-03-02 | End: 2022-03-02 | Stop reason: HOSPADM

## 2022-03-02 RX ORDER — EPHEDRINE SULFATE 50 MG/ML
INJECTION, SOLUTION INTRAMUSCULAR; INTRAVENOUS; SUBCUTANEOUS PRN
Status: DISCONTINUED | OUTPATIENT
Start: 2022-03-02 | End: 2022-03-02

## 2022-03-02 RX ORDER — OXYCODONE HYDROCHLORIDE 5 MG/1
5-10 TABLET ORAL
Status: COMPLETED | OUTPATIENT
Start: 2022-03-02 | End: 2022-03-02

## 2022-03-02 RX ORDER — CEFAZOLIN SODIUM 2 G/100ML
2 INJECTION, SOLUTION INTRAVENOUS SEE ADMIN INSTRUCTIONS
Status: DISCONTINUED | OUTPATIENT
Start: 2022-03-02 | End: 2022-03-02 | Stop reason: HOSPADM

## 2022-03-02 RX ORDER — CYCLOBENZAPRINE HCL 10 MG
10 TABLET ORAL 3 TIMES DAILY PRN
Qty: 60 TABLET | Refills: 1 | Status: SHIPPED | OUTPATIENT
Start: 2022-03-02 | End: 2022-05-31

## 2022-03-02 RX ORDER — AMOXICILLIN 250 MG
1-2 CAPSULE ORAL 2 TIMES DAILY
Qty: 30 TABLET | Refills: 0 | Status: SHIPPED | OUTPATIENT
Start: 2022-03-02 | End: 2022-05-31

## 2022-03-02 RX ORDER — HYDROMORPHONE HYDROCHLORIDE 1 MG/ML
0.4 INJECTION, SOLUTION INTRAMUSCULAR; INTRAVENOUS; SUBCUTANEOUS EVERY 5 MIN PRN
Status: DISCONTINUED | OUTPATIENT
Start: 2022-03-02 | End: 2022-03-02 | Stop reason: HOSPADM

## 2022-03-02 RX ORDER — GABAPENTIN 300 MG/1
300 CAPSULE ORAL
Status: COMPLETED | OUTPATIENT
Start: 2022-03-02 | End: 2022-03-02

## 2022-03-02 RX ORDER — FENTANYL CITRATE 50 UG/ML
50 INJECTION, SOLUTION INTRAMUSCULAR; INTRAVENOUS
Status: DISCONTINUED | OUTPATIENT
Start: 2022-03-02 | End: 2022-03-02 | Stop reason: HOSPADM

## 2022-03-02 RX ORDER — GLYCOPYRROLATE 0.2 MG/ML
INJECTION, SOLUTION INTRAMUSCULAR; INTRAVENOUS PRN
Status: DISCONTINUED | OUTPATIENT
Start: 2022-03-02 | End: 2022-03-02

## 2022-03-02 RX ORDER — OXYCODONE HYDROCHLORIDE 5 MG/1
5 TABLET ORAL EVERY 4 HOURS PRN
Status: DISCONTINUED | OUTPATIENT
Start: 2022-03-02 | End: 2022-03-02 | Stop reason: HOSPADM

## 2022-03-02 RX ORDER — LIDOCAINE HYDROCHLORIDE 20 MG/ML
INJECTION, SOLUTION INFILTRATION; PERINEURAL PRN
Status: DISCONTINUED | OUTPATIENT
Start: 2022-03-02 | End: 2022-03-02

## 2022-03-02 RX ORDER — CEFAZOLIN SODIUM 2 G/100ML
2 INJECTION, SOLUTION INTRAVENOUS
Status: COMPLETED | OUTPATIENT
Start: 2022-03-02 | End: 2022-03-02

## 2022-03-02 RX ORDER — METHOCARBAMOL 750 MG/1
750 TABLET, FILM COATED ORAL
Status: COMPLETED | OUTPATIENT
Start: 2022-03-02 | End: 2022-03-02

## 2022-03-02 RX ORDER — ONDANSETRON 2 MG/ML
4 INJECTION INTRAMUSCULAR; INTRAVENOUS EVERY 30 MIN PRN
Status: DISCONTINUED | OUTPATIENT
Start: 2022-03-02 | End: 2022-03-02 | Stop reason: HOSPADM

## 2022-03-02 RX ADMIN — SODIUM CHLORIDE, POTASSIUM CHLORIDE, SODIUM LACTATE AND CALCIUM CHLORIDE: 600; 310; 30; 20 INJECTION, SOLUTION INTRAVENOUS at 14:43

## 2022-03-02 RX ADMIN — METHOCARBAMOL 750 MG: 750 TABLET ORAL at 18:32

## 2022-03-02 RX ADMIN — SODIUM CHLORIDE, POTASSIUM CHLORIDE, SODIUM LACTATE AND CALCIUM CHLORIDE: 600; 310; 30; 20 INJECTION, SOLUTION INTRAVENOUS at 13:15

## 2022-03-02 RX ADMIN — FENTANYL CITRATE 50 MCG: 50 INJECTION, SOLUTION INTRAMUSCULAR; INTRAVENOUS at 14:46

## 2022-03-02 RX ADMIN — Medication 10 MG: at 15:12

## 2022-03-02 RX ADMIN — FENTANYL CITRATE 50 MCG: 50 INJECTION INTRAMUSCULAR; INTRAVENOUS at 18:05

## 2022-03-02 RX ADMIN — FENTANYL CITRATE 50 MCG: 50 INJECTION, SOLUTION INTRAMUSCULAR; INTRAVENOUS at 14:43

## 2022-03-02 RX ADMIN — SUGAMMADEX 200 MG: 100 INJECTION, SOLUTION INTRAVENOUS at 16:56

## 2022-03-02 RX ADMIN — CEFAZOLIN SODIUM 2 G: 2 INJECTION, SOLUTION INTRAVENOUS at 14:30

## 2022-03-02 RX ADMIN — GLYCOPYRROLATE 0.2 MCG: 0.2 INJECTION, SOLUTION INTRAMUSCULAR; INTRAVENOUS at 15:10

## 2022-03-02 RX ADMIN — ROCURONIUM BROMIDE 30 MG: 50 INJECTION, SOLUTION INTRAVENOUS at 15:17

## 2022-03-02 RX ADMIN — GABAPENTIN 300 MG: 300 CAPSULE ORAL at 13:01

## 2022-03-02 RX ADMIN — LIDOCAINE HYDROCHLORIDE 1 ML: 10 INJECTION, SOLUTION EPIDURAL; INFILTRATION; INTRACAUDAL; PERINEURAL at 13:07

## 2022-03-02 RX ADMIN — PROPOFOL 200 MG: 10 INJECTION, EMULSION INTRAVENOUS at 14:46

## 2022-03-02 RX ADMIN — FENTANYL CITRATE 50 MCG: 50 INJECTION INTRAMUSCULAR; INTRAVENOUS at 17:22

## 2022-03-02 RX ADMIN — SODIUM CHLORIDE, POTASSIUM CHLORIDE, SODIUM LACTATE AND CALCIUM CHLORIDE: 600; 310; 30; 20 INJECTION, SOLUTION INTRAVENOUS at 16:37

## 2022-03-02 RX ADMIN — DEXAMETHASONE SODIUM PHOSPHATE 5 MG: 10 INJECTION, SOLUTION INTRAMUSCULAR; INTRAVENOUS at 15:07

## 2022-03-02 RX ADMIN — LIDOCAINE HYDROCHLORIDE 60 ML: 20 INJECTION, SOLUTION INFILTRATION; PERINEURAL at 14:46

## 2022-03-02 RX ADMIN — ROCURONIUM BROMIDE 50 MG: 50 INJECTION, SOLUTION INTRAVENOUS at 14:46

## 2022-03-02 RX ADMIN — OXYCODONE HYDROCHLORIDE 5 MG: 5 TABLET ORAL at 18:32

## 2022-03-02 RX ADMIN — ROCURONIUM BROMIDE 20 MG: 50 INJECTION, SOLUTION INTRAVENOUS at 15:31

## 2022-03-02 RX ADMIN — MIDAZOLAM 2 MG: 1 INJECTION INTRAMUSCULAR; INTRAVENOUS at 14:40

## 2022-03-02 RX ADMIN — Medication 10 MG: at 15:09

## 2022-03-02 RX ADMIN — HYDROXYZINE HYDROCHLORIDE 25 MG: 25 TABLET ORAL at 18:33

## 2022-03-02 RX ADMIN — Medication 5 MG: at 15:07

## 2022-03-02 ASSESSMENT — LIFESTYLE VARIABLES: TOBACCO_USE: 1

## 2022-03-02 NOTE — BRIEF OP NOTE
[facility]    Brief Operative Note    Pre-operative diagnosis: Thoracic radiculopathy [M54.14]  Post-operative diagnosis Same as pre-operative diagnosis    Procedure: Procedure(s):  Right Thoracic 9 to Thoracic 10 laminotomy/foraminotomy  Surgeon: Surgeon(s) and Role:     * Red Burnette MD - Primary     * Phillip Leblanc PA-C  Anesthesia: General   Estimated Blood Loss: 50 mL from 3/2/2022  2:43 PM to 3/2/2022  5:00 PM      Drains: None  Specimens: * No specimens in log *  Findings:   None.  Complications: None.  Implants: * No implants in log *      Phillip Leblanc PA-C  [facility] Neurosurgery  [facility]      Tel [phone]  Pager [phone]

## 2022-03-02 NOTE — ANESTHESIA CARE TRANSFER NOTE
Patient: Hannah Krishna    Procedure: Procedure(s):  Right Thoracic 9 to Thoracic 10 laminotomy/foraminotomy       Diagnosis: Thoracic radiculopathy [M54.14]  Diagnosis Additional Information: No value filed.    Anesthesia Type:   General     Note:    Oropharynx: oropharynx clear of all foreign objects and spontaneously breathing  Level of Consciousness: drowsy  Oxygen Supplementation: face mask    Independent Airway: airway patency satisfactory and stable  Dentition: dentition unchanged  Vital Signs Stable: post-procedure vital signs reviewed and stable  Report to RN Given: handoff report given  Patient transferred to: PACU    Handoff Report: Identifed the Patient, Identified the Reponsible Provider, Reviewed the pertinent medical history, Discussed the surgical course, Reviewed Intra-OP anesthesia mangement and issues during anesthesia, Set expectations for post-procedure period and Allowed opportunity for questions and acknowledgement of understanding      Vitals:  Vitals Value Taken Time   BP     Temp     Pulse 85 03/02/22 1705   Resp 18 03/02/22 1705   SpO2 100 % 03/02/22 1705   Vitals shown include unvalidated device data.    Electronically Signed By: VEENA Cristina CRNA  March 2, 2022  5:06 PM

## 2022-03-03 NOTE — DISCHARGE INSTRUCTIONS
Spine and Brain Clinic at [facility]  Dr. Burnette Discharge Instructions Following Spine Surgery  [phone] ([facility] Office)  Monday - Friday; 8:00 AM - 4:00 PM    In General:   After you have had surgery on your spine, remember do not bend, twist, or overhead reach. No lifting over 10 lbs. These activities can prevent healing.  Pain is normal and to be expected following surgery.      Bowel Care:  Many people have constipation (hard stools) after surgery.  To help prevent constipation: Drink plenty of fluid (8-10 glasses/day); Eat more fiber, such as whole grain bread, bran cereal, and fruits and vegetables; Stay active by walking; Over the counter stool softener may also help.      Medications:  Spine surgery and pain management is unique to all patients.  You will generally be given medications for pain, muscle spasms or tightness, and for constipation during the immediate post op period.  It is important that you use these as prescribed.  Avoid driving while taking narcotic pain medications.  Avoid alcoholic beverages while taking narcotic pain medications. You can use ice to areas of pain as needed, 20 minutes at a time.  Changing positions and walking will help loosen your muscles as well.  No NSAIDs (Ibuprofen, Advil, Motrin, Aleve, Naproxen) until given the ok (likely at one of your follow up appointments).      Driving:  No driving while on narcotic pain medications.  It is state law not to drive while under the influence of a drug to a degree which renders you incapable of safely driving.  The narcotic medication you will be taking after surgery falls under this category. Wait 24 hours from your last narcotic pain medication before driving.     Activity:   After surgery, most people feel less pain than they have had in a long time.  Walking and light activities will help you regain the use of your muscles.  You are encouraged to walk: start with short walks 5-10 minutes at a time for 4-5  times per day and increase as tolerated.  Stair climbing as tolerated, we recommend you use the railing. No lifting greater than 10 pounds: approximately equal to one gallon of milk. No twisting, bending, or overhead reaching in the area you have had surgery. No housework, vacuuming, laundry, leaf raking, lawn mowing, or snow removal. Wear your brace (if ordered) as directed.    Showers:  You may shower two days after surgery without covering your incision. It is ok to let water run over your incision but do not touch or scrub on the incision. Pat dry immediately after showering. If there is a dressing in place, you may remove it 2 days after surgery. Do not apply lotions or ointments to your incision. No baths, hot tubs, or pool activity for at least 6 weeks.     Nutrition:  In general, your diet restrictions will not change with your surgery.  You may need to eat small frequent meals initially until your appetite returns.  Eat plenty of high fiber foods and drink plenty of fluids. If you do not have a fluid restriction from or prior to surgery, we recommend 6-8 (8oz) glasses of water per day. Other fluids are fine, but water is best. Nausea is not uncommon; it is a common side effect to many pain medications.  We recommend that you take the pain medications with food, if this does not improve your symptoms, please call us.     Smoking:  For proper healing it is required that you quit using all tobacco products.  This includes smoking, chewing, nicotine gums, and nicotine patches.    Follow-Up Appointment  2 weeks post-op with neurosurgery nurse or provider.  Call [phone] for appointment (if not already scheduled).    Call the neurosurgery clinic at [phone] if these occur: signs of incisional infection (redness, swelling, warmth, pain, drainage, temperature greater than 101.5), increased leg/arm pain and/or swelling, unrelieved headaches, new injury, bladder or bowel changes or loss of control, difficulty  swallowing liquids or pills.     Go to the nearest Emergency Room if you experience: sudden onset of severe headache, weakness, confusion, change in level of consciousness, pain, loss of movement or neck swelling/swallowing problems, chest pain, shortness of breath.     [facility] Same-Day Surgery   Adult Discharge Orders & Instructions     For 24 hours after surgery    1. Get plenty of rest.  A responsible adult must stay with you for at least 24 hours after you leave the hospital.   2. Do not drive or use heavy equipment.  If you have weakness or tingling, don't drive or use heavy equipment until this feeling goes away.  3. Do not drink alcohol.  4. Avoid strenuous or risky activities.  Ask for help when climbing stairs.   5. You may feel lightheaded.  If so, sit for a few minutes before standing.  Have someone help you get up.   6. You may have a slight fever. Call the doctor if your fever is over 100 F (37.7 C) (taken under the tongue) or lasts longer than 24 hours.  7. You may have a dry mouth, a sore throat, muscle aches or trouble sleeping.  These should go away after 24 hours.  8. Do not make important or legal decisions.  We don t expect you to have any problems from the surgery or treatment you had today. Just in case, here s what to do if you have pain, upset stomach (nausea), bleeding or infection:  Pain:  Take medicines your doctor has prescribed or over-the-counter medicine they have suggested. Resting and using ice packs can help, too. For surgery on an arm or leg, raise it on a pillow to ease swelling. Call your doctor if these methods don t work.  Copyright [employer], Licensed under [employer]  Upset stomach (nausea):  Take anti-nausea medicine approved by your doctor. Drink clear liquids like apple juice, ginger ale, broth or 7-Up. Be sure to drink enough fluids. Rest can help, too. Move to normal foods when you re ready. Bleeding:  In the first 24 hours, you may see a  little blood on your dressing, about the size of a quarter. You don t need to worry about this much blood, but if the blood spot keeps getting bigger:    Put pressure on the wound if you can, AND    Call your doctor.  Copyright [employer], Licensed under [employer]  Fever/Infection: Please call your doctor if you have any of these signs:    Redness    Swelling    Wound feels warm    Pain gets worse    Bad-smelling fluid leaks from wound    Fever or chills  Call your doctor for any of the followin.  It has been over 8 to 10 hours since surgery and you are still not able to urinate (pass water).    2.  Headache for over 24 hours.    3.  Numbness, tingling or weakness in your legs the day after surgery (if you had spinal anesthesia).    24 Hour Nurse advice line: [phone]

## 2022-03-03 NOTE — ANESTHESIA POSTPROCEDURE EVALUATION
Patient: Hannah Krishna    Procedure: Procedure(s):  Right Thoracic 9 to Thoracic 10 laminotomy/foraminotomy       Anesthesia Type:  General    Note:  Disposition: Outpatient   Postop Pain Control: Uneventful            Sign Out: Well controlled pain   PONV: No   Neuro/Psych: Uneventful            Sign Out: Acceptable/Baseline neuro status   Airway/Respiratory: Uneventful            Sign Out: Acceptable/Baseline resp. status   CV/Hemodynamics: Uneventful            Sign Out: Acceptable CV status   Other NRE: NONE   DID A NON-ROUTINE EVENT OCCUR? No    Event details/Postop Comments:  Pt was happy with anesthesia care.  No complications.  I will follow up with the pt if needed.           Last vitals:  Vitals Value Taken Time   /83 03/02/22 1750   Temp 96.9  F (36.1  C) 03/02/22 1735   Pulse 70 03/02/22 1751   Resp 18 03/02/22 1751   SpO2 91 % 03/02/22 1751   Vitals shown include unvalidated device data.    Electronically Signed By: VEENA Cristina CRNA  March 2, 2022  7:02 PM

## 2022-03-03 NOTE — OP NOTE
DATE OF SURGERY: 3/2/22      SURGEON:  Red Burnette MD   ASSISTANT:  ELIZ Turcios   NOTE:  Phillip Leblanc was present for and assisted with the entire surgery and his/her role as an assistant was crucial for aid in positioning, exposure, suctioning, retraction and closure.       PREOPERATIVE DIAGNOSIS:  Thoracic radiculopathy      POSTOPERATIVE DIAGNOSIS:  Thoracic radiculopathy      PROCEDURES:   1.  Right T9-10 laminotomies, medial facetectomy, and transpedicular approach for foraminotomy and discectomy.   2.  Use of intraoperative microscope and fluoroscopy.       ESTIMATED BLOOD LOSS:  50 mL.       INDICATIONS FOR SURGERY: 52 year-old female with intractable right chest/flank pain, thoracic foraminal stenosis.  Extensive non-surgical management without improvement.  Underwent diagnostic selective nerve root block with Dr. Gibbons which confirmed symptoms originating from the right T9 nerve.  Risks, benefits, indications, and alternatives were discussed with the patient and family in detail.  All their questions were answered, and they wished to proceed with surgery.       DESCRIPTION OF SURGERY:  The patient was positioned prone on a Gerald table in [place]choa pins.  Sterile prepping and draping procedures were performed.  Antibiotics were administered and timeout was performed.  The 10 blade was used to perform a midline incision and the monopolar was used to perform a subperiosteal dissection of T9 and T10.  Fluoroscopy was used to count the ribs and identify the correct level.  The level was confirmed with radiology.  Subsequently, the microscope was brought into the field, and under microscopy, the high-speed drill was used to perform right-sided lamintomies, medial facetectomy, and drill a transpedicular approach.  The nerve root was completely uncovered and noted to be erythematous.  Herniated disc was removed with the pituitary rongeurs.  The nerve root was subsequently noted to be decompressed.  Antibiotic  irrigation was performed.  The fascial layer was closed with 0 Vicryl sutures.  The dermal layer was closed with 2-0 Vicryl sutures and the skin was closed with a running subcuticular stitch.  No intraprocedural complications.

## 2022-03-07 ENCOUNTER — TELEPHONE (OUTPATIENT)
Dept: NEUROSURGERY | Facility: CLINIC | Age: 52
End: 2022-03-07
Payer: COMMERCIAL

## 2022-03-07 NOTE — TELEPHONE ENCOUNTER
Received forms from [employer] via neurosurgery box,  reached out to patient to gather further info      LVM for patient to call back . [phone]      Olga Campbell on 3/7/2022 at 1:28 PM

## 2022-03-09 NOTE — TELEPHONE ENCOUNTER
Patient called back. I tried to see if I could help with the answers as not sure what the questions are - and the only thing we know for sure is the patient is out of work for the month of March. Please call patient back if further questions

## 2022-03-10 NOTE — TELEPHONE ENCOUNTER
Form completed as much as I could. Placed form in [facility] mailbox. Patient updated and is ok waiting until Monday to have Phillip WELLINGTON sign the paperwork.     Upon completion, fax to Joy Singh at [phone] and mail to patient's home address.    Sunita Estrada RN on 3/10/2022 at 5:52 PM

## 2022-03-14 NOTE — TELEPHONE ENCOUNTER
Forms were signed. Faxed to Joy Singh at [phone] and mailed to patient's home address.     Made copy for CORBY Dorsey  MP/MA

## 2022-03-16 ENCOUNTER — TELEPHONE (OUTPATIENT)
Dept: NEUROSURGERY | Facility: OTHER | Age: 52
End: 2022-03-16
Payer: COMMERCIAL

## 2022-03-16 DIAGNOSIS — Z98.890 S/P SPINAL SURGERY: Primary | ICD-10-CM

## 2022-03-17 NOTE — TELEPHONE ENCOUNTER
"3/2/22   S/P Right T9-10 laminotomies, medial facetectomy and transpedicular approach for foraminotomy and discectomy with Dr Burnette     Next Visit:  3/18  2 week nurse appointment    Assessment:   The patient had called and left a message late 3/16/22.   She reports she had been recovering from surgery very well until yesterday afternoon.  She had just showered and was bending to drying her legs. As she was returning to the standing position she \"felt something shift out of place about 2 inches below the incision. \"  This is a new pain.  She rates it at 7-8/10.  She is able to stand but has to bend at the knees to get to the sitting position.  She applied ice and has been taking the flexeril every 4 hours even during the night.  Tylenol 1000 mg every 8 hours. She states she feels \"like if someone could just push that area and it would go back in place.\"       The patient had stopped the oxycodone a few days after surgery because she does not like the way it makes her feel.    The incision is healing well and without signs of infection.  Not tender to touch. No fever. No bowel or bladder issues. The only numbness is near the incision.     She does have her 2 week appointment tomorrow.     Will message the care team.           "

## 2022-03-18 ENCOUNTER — OFFICE VISIT (OUTPATIENT)
Dept: NEUROSURGERY | Facility: CLINIC | Age: 52
End: 2022-03-18
Payer: OTHER MISCELLANEOUS

## 2022-03-18 ENCOUNTER — HOSPITAL ENCOUNTER (OUTPATIENT)
Dept: GENERAL RADIOLOGY | Facility: CLINIC | Age: 52
Discharge: HOME | End: 2022-03-18
Attending: PHYSICIAN ASSISTANT | Admitting: PHYSICIAN ASSISTANT
Payer: OTHER MISCELLANEOUS

## 2022-03-18 VITALS
BODY MASS INDEX: 38.54 KG/M2 | WEIGHT: 224.5 LBS | OXYGEN SATURATION: 97 % | RESPIRATION RATE: 18 BRPM | SYSTOLIC BLOOD PRESSURE: 100 MMHG | HEART RATE: 81 BPM | TEMPERATURE: 96.5 F | DIASTOLIC BLOOD PRESSURE: 70 MMHG

## 2022-03-18 DIAGNOSIS — Z98.890 S/P SPINAL SURGERY: ICD-10-CM

## 2022-03-18 DIAGNOSIS — Z98.890 S/P SPINAL SURGERY: Primary | ICD-10-CM

## 2022-03-18 PROCEDURE — 99207 PR NO CHARGE NURSE ONLY: CPT

## 2022-03-18 PROCEDURE — 72080 X-RAY EXAM THORACOLMB 2/> VW: CPT

## 2022-03-18 RX ORDER — METHOCARBAMOL 500 MG/1
500 TABLET, FILM COATED ORAL 4 TIMES DAILY PRN
Qty: 40 TABLET | Refills: 0 | Status: SHIPPED | OUTPATIENT
Start: 2022-03-18 | End: 2022-03-30

## 2022-03-18 RX ORDER — HYDROCODONE BITARTRATE AND ACETAMINOPHEN 5; 325 MG/1; MG/1
1-2 TABLET ORAL EVERY 6 HOURS PRN
Qty: 30 TABLET | Refills: 0 | Status: SHIPPED | OUTPATIENT
Start: 2022-03-18 | End: 2022-03-30

## 2022-03-18 ASSESSMENT — PAIN SCALES - GENERAL: PAINLEVEL: WORST PAIN (10)

## 2022-03-18 NOTE — PROGRESS NOTES
Post-op Nurse Visit:    Patient presents today s/p right T9-10 laminotomies, medial facetectomy and transpedicular approach for foraminotomy and discectomy on 3/2/22 per the order of Dr. Burnette. Kalyn (work comp) & Milt accompanies patient.    Pain/Neuro Assessment  10/10 to right mid-back described as strong ache/pressure/constant. Standing position is less irritating than sitting. Pain new starting 2 days ago.  When drying right leg 2 days ago in shower, felt a shift in right-mid-back. Pain is different than pre-op. Phillip was updated yesterday and ordered a thoracic x-ray prior to today's visit.     Numbness to right-mid-back is about the diameter of a softball -unchanged since surgery. Hurts to take a deep breath. Denies n/t to BLE.  Equal strength to bilateral lower extremities. Denies weakness.     Pain Relief Measures:  Oxycodone: stopped taking 3 days after surgery. Doesn't like the way it makes her feel.  Tylenol: 1000mg q6h  flexeril: 1 tab q4h. Helping a little.  Ice: few times per day.     Incision   Incision inspected. Edges well-approximated. No redness, swelling, drainage, or warmth noted.     Activity  Following restrictions   Falls:  none  Patient is walking frequently without difficulty.   Wearing brace? No    GI/  Patient's appetite is normal  Bowel/bladder problems? No  Taking stool softeners? No     Refills/x-rays/return to work  Refills given at this appointment? Yes  Sent for x-rays before this appointment? Yes  Return to work discussed at this appointment? No  I filled out paperwork to excuse her for 4 weeks post-op.    Writer to fax Kalyn Jorge ([facility] - WORK COMP) copies of new orders, notes, and work release after today's visit. Phone: [phone]   Fax: [phone].     Ok to leave detailed message on patient's phone.    All of patient's questions addressed today. Patient was instructed to call with any additional questions/concerns.     Questions for ANAY:  1.  Phillip ordered thoracic x-rays.  X-ray image in chart. Report not dictated yet. Do you have further recommendations? Further imaging?  2.  Patient wont take oxycodone as she doesn't like the way it makes her feel. Could we prescribe her hydrocodone? -med pended.  3.  Patient taking flexeril 1 tab q4h. Instructed her to only take TID. Are you ok to switch her to robaxin? -med pended.  4.  Considering this new onset of severe pain, ok to extend time off work to April 15th (6 week f/u visit)?     Response from Lucita Anton CNP:  Scripts for norco and robaxin.   Okay to extend time off work.   Let's move up her next appt with ANAY.   F/u with PCP for the pain upon deep breaths.     If the medication adjustment doesn't help her pain, then she should call clinic.     Writer called patient after clinic visit and read her ANAY's recommendations. I moved up patient's 6 week post-op. Patient said will schedule f/u with PCP. Instructed patient NOT to take oxycodone or flexeril anymore now that her medication regimen changed.    Sunita Estrada RN on 3/18/2022 at 10:34 AM

## 2022-03-18 NOTE — LETTER
March 18, 2022      Hannah Krishna  [id number] [address] [address]        To Whom It May Concern:    Hannah Krishna  was seen on 3/18/22.  Please excuse her until re-evaluated at her next office visit on 4/11/22 due to surgery recovery.        Sincerely,        Lucita Anton, CNP

## 2022-03-18 NOTE — PATIENT INSTRUCTIONS
Instructions for Patient    I will call you with and update later today.    Keep your incision clean and dry at all times.     Showing is ok. No submerging incision under water such as baths, hot tubs, or swimming for the first 30 days and the incision is well healed.     Do not apply lotions or ointments to incision    No lifting greater than 10 pounds. No bending, twisting, or overhead reaching.    Walking: Walking is the best way to start exercise after surgery. Take short frequent walks. You may gradually increase the distance as tolerated. If you feel pain, decrease your activity, but DO NOT stop walking. Walking will help you gain strength, prevent muscle soreness and spasms, and prevent blood clots.     Weaning from narcotic pain medications    When it is time, start weaning by extending the time between doses.     For example, if you're taking 2 tabs every 4 hours, spread it out to 2 tabs over 4.5, 5, 6 hours.     At that point you can certainly cut down to 1 tab, then wean to an as needed basis until completely done with them.    Medication refills: call our clinic 2-3 business days before you are out of medication. A nurse will call you back to obtain a pain assessment.     Don't take more than 3000mg of Acetaminophen in 24 hours    Ok to begin taking Aspirin and NSAIDs (ex: ibuprofen/Advil)    Encouraged icing for at least 1-2 times throughout the day for 20-30 minutes at a time. Avoid heat to the incision area.     Taking stool softeners regularly can reduce constipation commonly caused by narcotic pain medications .    Contact clinic right away if you develop:     Infection (redness, swelling, warmth, drainage, fever over 101 F)    New injury    Bladder or bowel changes or loss of control      Signs of blood clot:  o Swelling and/or warmth in one or both legs  o Pain or tenderness in your leg, ankle, foot, or arm   o Red or discolored skin     Go to the Emergency Room     If sudden onset of severe  headache, weakness, confusion, change in level of consciousness, pain, or loss of movement.    Chest pain     Trouble breathing     Post-operative appointments    6 week post op, 3 months post op  Sunita SUTTON RN  [facility]  [facility]  [address] [place] [address]  Telephone:  [phone]   Fax:  [phone]

## 2022-03-18 NOTE — Clinical Note
Hannah or Phillip,  I saw this patient for 2 week post-op and have the following questions:    1.  Phillip ordered thoracic x-rays. X-ray image in chart. Report not dictated yet. Do you have further recommendations? Further imaging?  2.  Patient wont take oxycodone as she doesn't like the way it makes her feel. Could we prescribe her hydrocodone? -med pended.  3.  Patient taking flexeril 1 tab q4h. Instructed her to only take TID. Are you ok to switch her to robaxin? -med pended.  4.  Considering this new onset of severe pain, ok to extend time off work to April 15th (6 week f/u visit)?     ThanksSunita

## 2022-03-18 NOTE — PROGRESS NOTES
Faxed new work letter, notes, and script orders to Kalyn Jorge at [phone]. Verified via [employer]ax.    Sunita Estrada RN on 3/18/2022 at 1:55 PM

## 2022-03-29 DIAGNOSIS — F33.1 MAJOR DEPRESSIVE DISORDER, RECURRENT EPISODE, MODERATE (H): ICD-10-CM

## 2022-03-29 DIAGNOSIS — I10 BENIGN ESSENTIAL HYPERTENSION: ICD-10-CM

## 2022-03-29 RX ORDER — METOPROLOL SUCCINATE 50 MG/1
TABLET, EXTENDED RELEASE ORAL
Qty: 90 TABLET | Refills: 3 | Status: SHIPPED | OUTPATIENT
Start: 2022-03-29 | End: 2023-03-27

## 2022-03-29 RX ORDER — BUPROPION HYDROCHLORIDE 300 MG/1
300 TABLET ORAL EVERY MORNING
Qty: 90 TABLET | Refills: 1 | Status: SHIPPED | OUTPATIENT
Start: 2022-03-29 | End: 2022-09-27

## 2022-03-29 RX ORDER — ESCITALOPRAM OXALATE 10 MG/1
10 TABLET ORAL DAILY
Qty: 90 TABLET | Refills: 1 | Status: SHIPPED | OUTPATIENT
Start: 2022-03-29 | End: 2022-09-27

## 2022-03-30 ENCOUNTER — TELEPHONE (OUTPATIENT)
Dept: NEUROSURGERY | Facility: CLINIC | Age: 52
End: 2022-03-30
Payer: COMMERCIAL

## 2022-03-30 DIAGNOSIS — Z98.890 S/P SPINAL SURGERY: ICD-10-CM

## 2022-03-30 RX ORDER — HYDROCODONE BITARTRATE AND ACETAMINOPHEN 5; 325 MG/1; MG/1
1-2 TABLET ORAL EVERY 6 HOURS PRN
Qty: 30 TABLET | Refills: 0 | Status: SHIPPED | OUTPATIENT
Start: 2022-03-30 | End: 2022-05-31

## 2022-03-30 RX ORDER — METHOCARBAMOL 500 MG/1
500 TABLET, FILM COATED ORAL 4 TIMES DAILY PRN
Qty: 40 TABLET | Refills: 0 | Status: SHIPPED | OUTPATIENT
Start: 2022-03-30 | End: 2022-04-25

## 2022-03-30 NOTE — TELEPHONE ENCOUNTER
Try for PA  for: Max fills for Norco 5-325  Insurance: [employer]  Insur phone: [phone]  Patient ID:[id number]  Please let us know when PA is granted/denied. Thank you!  Delicia Nolan, Pharmacy [facility] [place] [phone]

## 2022-03-30 NOTE — TELEPHONE ENCOUNTER
Patient calling for a refill of norco and robaxin.     DOS: 3/2/22  Procedure:  Right T9-10 laminotomies, medial facetectomy, and transpedicular approach for foraminotomy and discectomy.   Surgeon: Dr Yomaira Vail Post Op: 4 weeks    Current symptom(s): Reports pain on Right sided back. And reports a tightness 2 inches below her incision. She reports she is doing much better and taking less pain mediation.   Current pain management:   Norco: 1 tablet every 6 hours  Robaxin: 1 tablet every 6 hours    Last fill: 3/18/22  Next visit: 4/11/22    Medication pended for your approval, if appropriate. Pharmacy verified.     Any patient questions or concerns: na    Informed patient request will be forwarded to care team.    How Severe Are Your Spot(S)?: mild Have Your Spot(S) Been Treated In The Past?: has not been treated Hpi Title: Evaluation of Skin Lesions

## 2022-04-11 ENCOUNTER — OFFICE VISIT (OUTPATIENT)
Dept: NEUROSURGERY | Facility: CLINIC | Age: 52
End: 2022-04-11
Payer: COMMERCIAL

## 2022-04-11 ENCOUNTER — DOCUMENTATION ONLY (OUTPATIENT)
Dept: NEUROSURGERY | Facility: CLINIC | Age: 52
End: 2022-04-11

## 2022-04-11 VITALS
WEIGHT: 225 LBS | HEIGHT: 64 IN | SYSTOLIC BLOOD PRESSURE: 116 MMHG | BODY MASS INDEX: 38.41 KG/M2 | DIASTOLIC BLOOD PRESSURE: 68 MMHG | TEMPERATURE: 97.8 F

## 2022-04-11 DIAGNOSIS — G89.29 CHRONIC RIGHT-SIDED THORACIC BACK PAIN: Primary | ICD-10-CM

## 2022-04-11 DIAGNOSIS — M54.6 CHRONIC RIGHT-SIDED THORACIC BACK PAIN: Primary | ICD-10-CM

## 2022-04-11 PROCEDURE — 99024 POSTOP FOLLOW-UP VISIT: CPT | Performed by: PHYSICIAN ASSISTANT

## 2022-04-11 NOTE — PROGRESS NOTES
Neurosurgery Clinic  Neurosurgery followup:    HPI: 5 weeks s/p Right T9-10 hemilaminotomy and foraminotomy.  Her radicular pain has improved significantly.  She does have some muscular tenderness on the right side, just distal to the incision, which is nicely healed.      Exam:  Constitutional:  Alert, well nourished, NAD.  HEENT: Normocephalic, atraumatic.   Pulm:  Without shortness of breath   CV:  No pitting edema of BLE.      Neurological:  Awake  Alert  Oriented x 3  Motor exam:        IP Q DF PF EHL  R   5  5   5   5    5  L   5  5   5   5    5     Reflexes are 2+ in the patellar and Achilles. There is no clonus. Downgoing Babinski.      Able to spontaneously move L/E bilaterally  Sensation intact throughout all L/E dermatomes     Incision: well healed      A/P:    5 weeks status post right-sided T9-10 hemilaminotomy and foraminotomy.  She is doing well, with the exception of having some muscular strain on the right side of her thoracic spine, just distal to her incision.  This seems to be more muscular.  We discussed the benefits of physical therapy, and she did wish to proceed with that option.  We will see her back at her 3-month appointment, at which point she is hopefully ready to return to work without restriction.  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 We will do some PT for the next several weeks.       Phillip Leblanc PA-C  [facility]  [address] [address]    Tel [phone]  Pager [phone]      The use of Dragon/3rd Planetation services may have been used to construct the content in this note; any grammatical or spelling errors are non-intentional. Please contact the author of this note directly if you are in need of any clarification.

## 2022-04-11 NOTE — LETTER
[facility] [address] [address]  Phone: [phone]  Fax: [phone]    04/11/22    Hannah K Erendira  [address] [address] [place] [address]      To whom it may concern:     Hannah Krishna will remain off work until 5/15/22.     Thank You.      Phillip Leblanc PA-C  [facility]

## 2022-04-11 NOTE — LETTER
4/11/2022         RE: Hannah Krishna  [address]  [place] [address]        Dear Colleague,    Thank you for referring your patient, Hannah Krishna, to the [facility]UROSURG[facility] [place]. Please see a copy of my visit note below.    Neurosurgery Clinic  Neurosurgery followup:    HPI: 5 weeks s/p Right T9-10 hemilaminotomy and foraminotomy.  Her radicular pain has improved significantly.  She does have some muscular tenderness on the right side, just distal to the incision, which is nicely healed.      Exam:  Constitutional:  Alert, well nourished, NAD.  HEENT: Normocephalic, atraumatic.   Pulm:  Without shortness of breath   CV:  No pitting edema of BLE.      Neurological:  Awake  Alert  Oriented x 3  Motor exam:        IP Q DF PF EHL  R   5  5   5   5    5  L   5  5   5   5    5     Reflexes are 2+ in the patellar and Achilles. There is no clonus. Downgoing Babinski.      Able to spontaneously move L/E bilaterally  Sensation intact throughout all L/E dermatomes     Incision: well healed      A/P:    5 weeks status post right-sided T9-10 hemilaminotomy and foraminotomy.  She is doing well, with the exception of having some muscular strain on the right side of her thoracic spine, just distal to her incision.  This seems to be more muscular.  We discussed the benefits of physical therapy, and she did wish to proceed with that option.  We will see her back at her 3-month appointment, at which point she is hopefully ready to return to work without restriction.  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 We will do some PT for the next several weeks.       Phillip Leblanc PA-C  [facility]  [address]  [address] [place] [address], [place] [address]    Tel [phone]  Pager [phone]      The use of Dragon/[employer] services may have been used to construct the content in this note; any grammatical  "or spelling errors are non-intentional. Please contact the author of this note directly if you are in need of any clarification.    Hannah Krishna is a 52 year old female who presents for:  Chief Complaint   Patient presents with     Neurologic Problem     5 wk f/u: S/p Laminotomy / Foraminotomy T9-10 DOS 3/2/22        Initial Vitals:  /68 (BP Location: Right arm, Patient Position: Sitting, Cuff Size: Adult Regular)   Temp 97.8  F (36.6  C) (Temporal)   Ht 5' 4\" (1.626 m)   Wt 225 lb (102.1 kg)   BMI 38.62 kg/m   Estimated body mass index is 38.62 kg/m  as calculated from the following:    Height as of this encounter: 5' 4\" (1.626 m).    Weight as of this encounter: 225 lb (102.1 kg).. Body surface area is 2.15 meters squared. BP completed using cuff size: regular  Data Unavailable    Nursing Comments:     Olga Campbell        Again, thank you for allowing me to participate in the care of your patient.        Sincerely,        Phillip Leblanc PA-C    "

## 2022-04-11 NOTE — PROGRESS NOTES
"Hannah Krishna is a 52 year old female who presents for:  Chief Complaint   Patient presents with     Neurologic Problem     5 wk f/u: S/p Laminotomy / Foraminotomy T9-10 DOS 3/2/22        Initial Vitals:  /68 (BP Location: Right arm, Patient Position: Sitting, Cuff Size: Adult Regular)   Temp 97.8  F (36.6  C) (Temporal)   Ht 5' 4\" (1.626 m)   Wt 225 lb (102.1 kg)   BMI 38.62 kg/m   Estimated body mass index is 38.62 kg/m  as calculated from the following:    Height as of this encounter: 5' 4\" (1.626 m).    Weight as of this encounter: 225 lb (102.1 kg).. Body surface area is 2.15 meters squared. BP completed using cuff size: regular  Data Unavailable    Nursing Comments:     Olga Campbell    "

## 2022-04-11 NOTE — PROGRESS NOTES
Received fax from pt's CM who is requesting OV note and RTW letter    Faxed OV note, RTW letter April 11, 2022 to fax number [phone]    Right Fax confirmed at [phone]    Stacey Simpson RN

## 2022-04-14 ENCOUNTER — HOSPITAL ENCOUNTER (OUTPATIENT)
Dept: PHYSICAL THERAPY | Facility: CLINIC | Age: 52
Setting detail: THERAPIES SERIES
Discharge: HOME | End: 2022-04-14
Attending: PHYSICIAN ASSISTANT
Payer: OTHER MISCELLANEOUS

## 2022-04-14 DIAGNOSIS — G89.29 CHRONIC RIGHT-SIDED THORACIC BACK PAIN: ICD-10-CM

## 2022-04-14 DIAGNOSIS — M54.6 CHRONIC RIGHT-SIDED THORACIC BACK PAIN: ICD-10-CM

## 2022-04-14 PROCEDURE — 97161 PT EVAL LOW COMPLEX 20 MIN: CPT | Mod: GP | Performed by: PHYSICAL THERAPIST

## 2022-04-14 PROCEDURE — 97110 THERAPEUTIC EXERCISES: CPT | Mod: GP | Performed by: PHYSICAL THERAPIST

## 2022-04-14 PROCEDURE — 97140 MANUAL THERAPY 1/> REGIONS: CPT | Mod: GP | Performed by: PHYSICAL THERAPIST

## 2022-04-18 NOTE — PROGRESS NOTES
04/14/22 1400   General Information   Type of Visit Initial OP Ortho PT Evaluation   Start of Care Date 04/14/22   Referring Physician Phillip Leblanc PA-C   Patient/Family Goals Statement Reduce pain   Orders Evaluate and Treat   Date of Order 04/14/22   Certification Required? No   Medical Diagnosis Chronic R side thoracic pain   Surgical/Medical history reviewed Yes   Precautions/Limitations no known precautions/limitations   Weight-Bearing Status - LUE full weight-bearing   Weight-Bearing Status - RUE full weight-bearing   Weight-Bearing Status - LLE full weight-bearing   Weight-Bearing Status - RLE full weight-bearing   Special Instructions T9-10 hemilaminectomy, no lifting restrictions but progress cautiously.   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  Pt reports helping up a patient out of bed and injured her mid back.  Pt presented to PT with symptoms of mid back mm soreness.  Pt had a T9-T10 hemilaminectomy and foraminotomy on 3/2/22.  Pt notes tightness mainly through R side mid back.  Pt notes managing with mm relaxors.  Pt reports feeling weak through B LE s.  Pt notes PMH: T9-T10 hemilaminectomy and foraminotomy 3/2/22.  Chronic depressive personality disorder, HTN, obesity, Injections.  RCR 2016 R shoulder, bunionectomy 2013.   Impairments A. Pain;D. Decreased ROM;E. Decreased flexibility;F. Decreased strength and endurance   Functional Limitations perform activities of daily living;perform required work activities;perform desired leisure / sports activities   Symptom Location Mid back R>L.   How/Where did it occur At work   Onset date of current episode/exacerbation 03/02/22   Chronicity New   Pain rating (0-10 point scale) Best (/10);Worst (/10)   Best (/10) 2/10   Worst (/10) 6/10   Pain quality G.  Cramping;B. Dull;C. Aching   Frequency of pain/symptoms C. With activity   Pain/symptoms are: The same all the time   Pain/symptoms exacerbated by C. Lifting;G. Certain positions;I. Bending;K. Home tasks;L. Work tasks   Pain/symptoms eased by A. Sitting;C. Rest;E. Changing positions;F. Certain positions;I. OTC medication(s)   Progression of symptoms since onset: Improved   Current Level of Function   Current Community Support Family/friend caregiver   Patient role/employment history A. Employed   Employment Comments CNA   [facility]   Home/community accessibility no concerns   Current equipment-Gait/Locomotion None   Current equipment-ADL None   Fall Risk Screen   Fall screen completed by PT   Have you fallen 2 or more times in the past year? No   Have you fallen and had an injury in the past year? No   Abuse Screen (yes response referral indicated)   Feels Unsafe at Home or Work/School no   Feels Threatened by Someone no   Does Anyone Try to Keep You From Having Contact with Others or Doing Things Outside Your Home? no   Physical Signs of Abuse Present no   Patient needs abuse support services and resources No   Lumbar Spine/SI Objective Findings   Flexion ROM 90%   Extension ROM 80%   Right Side Bending ROM 50%   Left Side Bending ROM 50%   Hip Flexion (L2) Strength 4/5   Hip Abduction Strength 4/5   Hip Adduction Strength 5/5   Hip Extension Strength 4/5   Knee Flexion Strength 5/5   Knee Extension (L3) Strength 5/5   Ankle Dorsiflexion (L4) Strength 5/5   Great Toe Extension (L5) Strength 5/5   Ankle Plantar Flexion (S1) Strength 5/5   Shoulder Objective Findings   Side (if bilateral, select both right and left) Left   Shoulder Flexibility Comments Shoulder   Left Shoulder Flexion AROM WNL   Left Shoulder Flexion PROM WNL   Left Shoulder Abduction AROM WNL   Left Shoulder Abduction PROM WNL   Left Shoulder ER AROM WNL   Left Shoulder ER PROM WNL   Left Shoulder IR AROM WNL   Left Shoulder  IR PROM WNL   Left Shoulder Flexion Strength 5/5   Left Shoulder Abduction Strength 4/5   Left Shoulder ER Strength 5/5   Left Shoulder IR Strength 5/5   Left Shoulder Extension Strength 5/5   Planned Therapy Interventions   Planned Therapy Interventions joint mobilization;manual therapy;neuromuscular re-education;ROM;strengthening;stretching   Planned Modality Interventions   Planned Modality Interventions Cryotherapy;Electrical stimulation;Hot packs;TENS;Ultrasound   Planned Modality Interventions Comments as needed   Clinical Impression   Criteria for Skilled Therapeutic Interventions Met yes, treatment indicated   PT Diagnosis Thoracic spine pain   Influenced by the following impairments Pain, decreased ROM, impaired postural control   Functional limitations due to impairments Lifting, reaching, prolonged walking   Clinical Presentation Stable/Uncomplicated   Clinical Presentation Rationale Clinical judgement   Clinical Decision Making (Complexity) Low complexity   Therapy Frequency 2 times/Week   Predicted Duration of Therapy Intervention (days/wks) 8 weeks   Risk & Benefits of therapy have been explained Yes   Patient, Family & other staff in agreement with plan of care Yes   Clinical Impression Comments Pt is a 52 y.o. female who presented to PT with symptoms of mid back pain s/p Hemilaminnectomy and foraminotomy.   Education Assessment   Preferred Learning Style Listening;Demonstration;Pictures/video   Barriers to Learning No barriers   ORTHO GOALS   PT Ortho Eval Goals 1;2;3   Ortho Goal 1   Goal Identifier HEP   Goal Description Pt will be independent with HEP in order to improve postural and core stabilization.   Target Date 06/10/22   Ortho Goal 2   Goal Identifier Work simulation   Goal Description Pt will demonstrate safe technique with all work simulation activities with no significant increase in pain in order to resume prior level of function at work.   Target Date 06/10/22   Ortho Goal 3   Goal  Identifier JESSICA   Goal Description Pt will demonstrate 10% improvement of JESSICA in order to demonstrate functional improvement of back.   Target Date 06/10/22   Total Evaluation Time   PT Heidi Low Complexity Minutes (79170) 15

## 2022-04-19 ENCOUNTER — HOSPITAL ENCOUNTER (OUTPATIENT)
Dept: PHYSICAL THERAPY | Facility: CLINIC | Age: 52
Setting detail: THERAPIES SERIES
Discharge: HOME | End: 2022-04-19
Attending: PHYSICIAN ASSISTANT
Payer: OTHER MISCELLANEOUS

## 2022-04-19 PROCEDURE — 97110 THERAPEUTIC EXERCISES: CPT | Mod: GP | Performed by: PHYSICAL THERAPIST

## 2022-04-19 PROCEDURE — 97140 MANUAL THERAPY 1/> REGIONS: CPT | Mod: GP | Performed by: PHYSICAL THERAPIST

## 2022-04-21 ENCOUNTER — HOSPITAL ENCOUNTER (OUTPATIENT)
Dept: PHYSICAL THERAPY | Facility: CLINIC | Age: 52
Setting detail: THERAPIES SERIES
Discharge: HOME | End: 2022-04-21
Attending: PHYSICIAN ASSISTANT
Payer: OTHER MISCELLANEOUS

## 2022-04-21 PROCEDURE — 97140 MANUAL THERAPY 1/> REGIONS: CPT | Mod: GP | Performed by: PHYSICAL THERAPIST

## 2022-04-21 PROCEDURE — 97110 THERAPEUTIC EXERCISES: CPT | Mod: GP | Performed by: PHYSICAL THERAPIST

## 2022-04-25 DIAGNOSIS — Z98.890 S/P SPINAL SURGERY: ICD-10-CM

## 2022-04-25 RX ORDER — METHOCARBAMOL 500 MG/1
500 TABLET, FILM COATED ORAL 4 TIMES DAILY PRN
Qty: 40 TABLET | Refills: 0 | Status: SHIPPED | OUTPATIENT
Start: 2022-04-25 | End: 2022-05-31

## 2022-04-26 ENCOUNTER — HOSPITAL ENCOUNTER (OUTPATIENT)
Dept: PHYSICAL THERAPY | Facility: CLINIC | Age: 52
Setting detail: THERAPIES SERIES
Discharge: HOME | End: 2022-04-26
Attending: PHYSICIAN ASSISTANT
Payer: OTHER MISCELLANEOUS

## 2022-04-26 PROCEDURE — 97140 MANUAL THERAPY 1/> REGIONS: CPT | Mod: GP | Performed by: PHYSICAL THERAPIST

## 2022-04-26 PROCEDURE — 97110 THERAPEUTIC EXERCISES: CPT | Mod: GP | Performed by: PHYSICAL THERAPIST

## 2022-05-03 ENCOUNTER — HOSPITAL ENCOUNTER (OUTPATIENT)
Dept: PHYSICAL THERAPY | Facility: CLINIC | Age: 52
Setting detail: THERAPIES SERIES
Discharge: HOME | End: 2022-05-03
Attending: PHYSICIAN ASSISTANT
Payer: OTHER MISCELLANEOUS

## 2022-05-03 PROCEDURE — 97140 MANUAL THERAPY 1/> REGIONS: CPT | Mod: GP | Performed by: PHYSICAL THERAPIST

## 2022-05-03 PROCEDURE — 97110 THERAPEUTIC EXERCISES: CPT | Mod: GP | Performed by: PHYSICAL THERAPIST

## 2022-05-05 ENCOUNTER — HOSPITAL ENCOUNTER (OUTPATIENT)
Dept: PHYSICAL THERAPY | Facility: CLINIC | Age: 52
Setting detail: THERAPIES SERIES
Discharge: HOME | End: 2022-05-05
Attending: PHYSICIAN ASSISTANT
Payer: OTHER MISCELLANEOUS

## 2022-05-05 ENCOUNTER — TELEPHONE (OUTPATIENT)
Dept: NEUROSURGERY | Facility: CLINIC | Age: 52
End: 2022-05-05
Payer: COMMERCIAL

## 2022-05-05 PROCEDURE — 97140 MANUAL THERAPY 1/> REGIONS: CPT | Mod: GP | Performed by: PHYSICAL THERAPIST

## 2022-05-05 PROCEDURE — 97110 THERAPEUTIC EXERCISES: CPT | Mod: GP | Performed by: PHYSICAL THERAPIST

## 2022-05-05 NOTE — LETTER
[facility]  Neurosurgery Clinic  [address] [place] [address]  [id number]        May 9, 2022     To Whom it May Concern,      Hannah Krishna is being seen at our clinic for post-operative follow up care. He/she is to be off of work until she is evaluated at her follow-up visit on 6/3/2022.      Hannah Krishna will be re-evaluated at the next follow up visit. Please call our clinic with questions or concerns: [phone]      Sincerely,    Phillip Leblanc PA-C  (Electronically Signed, May 9, 2022, 1311)

## 2022-05-05 NOTE — TELEPHONE ENCOUNTER
Reason for Call:  Other call back    Detailed comments: Pt would like a call back to discuss returning to work. Pt has conflicting info and needs some clarification.    Phone Number Patient can be reached at: Cell number on file:    Telephone Information:   Mobile [phone]       Best Time: any    Can we leave a detailed message on this number? YES    Call taken on 5/5/2022 at 1:17 PM by Stephanie Helms

## 2022-05-06 NOTE — TELEPHONE ENCOUNTER
"Patient called in looking for RTW, she has conflicting info and needs some clarification -    Per Phillip Leblanc PA-C's last OV note: We will see her back at her 3-month appointment (6/3/22), at which point she is hopefully ready to return to work without restriction.  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     He doesn't state work restrictions in note or letter    Placed call to patient -     Patient states she is doing good with PT. She is \"scared\" to go back to work due to possible injury. Patient is requesting be off work until follow-up on 6/3/22, at that point she would be more comfortable decided on either going back to work, or if she still feels unsafe she would take on taking care of her mom who is declining.     Routed to Phillip Leblanc PA-C for his recc      "

## 2022-05-09 ENCOUNTER — HOSPITAL ENCOUNTER (OUTPATIENT)
Dept: PHYSICAL THERAPY | Facility: CLINIC | Age: 52
Setting detail: THERAPIES SERIES
Discharge: HOME | End: 2022-05-09
Attending: PHYSICIAN ASSISTANT
Payer: OTHER MISCELLANEOUS

## 2022-05-09 PROCEDURE — 97110 THERAPEUTIC EXERCISES: CPT | Mod: GP | Performed by: PHYSICAL THERAPIST

## 2022-05-09 PROCEDURE — 97140 MANUAL THERAPY 1/> REGIONS: CPT | Mod: GP | Performed by: PHYSICAL THERAPIST

## 2022-05-11 ENCOUNTER — HOSPITAL ENCOUNTER (OUTPATIENT)
Dept: PHYSICAL THERAPY | Facility: CLINIC | Age: 52
Setting detail: THERAPIES SERIES
Discharge: HOME | End: 2022-05-11
Attending: PHYSICIAN ASSISTANT
Payer: OTHER MISCELLANEOUS

## 2022-05-11 PROCEDURE — 97110 THERAPEUTIC EXERCISES: CPT | Mod: GP | Performed by: PHYSICAL THERAPIST

## 2022-05-11 PROCEDURE — 97140 MANUAL THERAPY 1/> REGIONS: CPT | Mod: GP | Performed by: PHYSICAL THERAPIST

## 2022-05-19 ENCOUNTER — HOSPITAL ENCOUNTER (OUTPATIENT)
Dept: PHYSICAL THERAPY | Facility: CLINIC | Age: 52
Setting detail: THERAPIES SERIES
Discharge: HOME | End: 2022-05-19
Attending: PHYSICIAN ASSISTANT
Payer: OTHER MISCELLANEOUS

## 2022-05-19 PROCEDURE — 97110 THERAPEUTIC EXERCISES: CPT | Mod: GP | Performed by: PHYSICAL THERAPIST

## 2022-05-19 PROCEDURE — 97140 MANUAL THERAPY 1/> REGIONS: CPT | Mod: GP | Performed by: PHYSICAL THERAPIST

## 2022-05-26 ENCOUNTER — HOSPITAL ENCOUNTER (OUTPATIENT)
Dept: PHYSICAL THERAPY | Facility: CLINIC | Age: 52
Setting detail: THERAPIES SERIES
Discharge: HOME | End: 2022-05-26
Attending: PHYSICIAN ASSISTANT
Payer: OTHER MISCELLANEOUS

## 2022-05-26 PROCEDURE — 97140 MANUAL THERAPY 1/> REGIONS: CPT | Mod: GP | Performed by: PHYSICAL THERAPIST

## 2022-05-26 PROCEDURE — 97110 THERAPEUTIC EXERCISES: CPT | Mod: GP | Performed by: PHYSICAL THERAPIST

## 2022-05-26 NOTE — PROGRESS NOTES
[facility]    Outpatient Physical Therapy Discharge Note  Patient: Hannah Krishna  : 1970    Beginning/End Dates of Reporting Period:  2022 to 2022    Referring Provider: coco patel PAC     Therapy Diagnosis: T9-T10 decompression      Client Self Report: overall is doing well with HEP , is overall able to be more active , still mindful of body mechanics and how she moves    Objective Measurements:  Objective Measure: at eval pain 2-6/10 JESSICA 20  Details: currently 2022 JESSICA 8.89 pain 0-2/10 not pain but muscle tightness      patient seen for 10 Rx sessions for exercises in clinic and instruction in HEP and use of manual therapy in clinic   At last Rx she was completing the following reviewed HEP and is going well B Rhomboid stretch in doorway x 10 seconds x5, cat-cow x 30 at countertop, thread the needle x 30 at countertop,  scap retraction 10 x 5 seconds, TA set 30 x 5 seconds, supine bent knee trunk rotation x 30.  in clinic nustep level 5 x 15, bosu at ll bars x 5 minutes , blue  theraband B shoulder extension 15x           Goals:  Goal Identifier HEP   Goal Description Pt will be independent with HEP in order to improve postural and core stabilization.   Target Date 06/10/22   Date Met      Progress (detail required for progress note): very compliant with HEP     Goal Identifier Work simulation   Goal Description Pt will demonstrate safe technique with all work simulation activities with no significant increase in pain in order to resume prior level of function at work.   Target Date 06/10/22   Date Met      Progress (detail required for progress note): good understanding of body mechanics     Goal Identifier JESSICA   Goal Description Pt will demonstrate 10% improvement of JESSICA in order to demonstrate functional improvement of back.   Target Date 06/10/22   Date Met      Progress (detail  required for progress note): 8.89     Plan:  Continue therapy per current plan of care.  Patient has 1 more Rx scheduled , and then has follow up with doctor   Plan is to be released to return to work   Work comp approved 16 Rx     Discharge:  No

## 2022-05-31 ENCOUNTER — OFFICE VISIT (OUTPATIENT)
Dept: FAMILY MEDICINE | Facility: OTHER | Age: 52
End: 2022-05-31
Payer: COMMERCIAL

## 2022-05-31 VITALS
HEIGHT: 64 IN | WEIGHT: 220 LBS | OXYGEN SATURATION: 99 % | HEART RATE: 60 BPM | SYSTOLIC BLOOD PRESSURE: 124 MMHG | DIASTOLIC BLOOD PRESSURE: 70 MMHG | BODY MASS INDEX: 37.56 KG/M2 | RESPIRATION RATE: 16 BRPM | TEMPERATURE: 97.7 F

## 2022-05-31 DIAGNOSIS — N89.8 VAGINAL SORE: Primary | ICD-10-CM

## 2022-05-31 DIAGNOSIS — Z12.4 PAP SMEAR FOR CERVICAL CANCER SCREENING: ICD-10-CM

## 2022-05-31 LAB
CLUE CELLS: ABNORMAL
TRICHOMONAS, WET PREP: ABNORMAL
WBC'S/HIGH POWER FIELD, WET PREP: ABNORMAL
YEAST, WET PREP: PRESENT

## 2022-05-31 PROCEDURE — 99213 OFFICE O/P EST LOW 20 MIN: CPT | Performed by: PHYSICIAN ASSISTANT

## 2022-05-31 PROCEDURE — G0145 SCR C/V CYTO,THINLAYER,RESCR: HCPCS | Performed by: PHYSICIAN ASSISTANT

## 2022-05-31 PROCEDURE — 87624 HPV HI-RISK TYP POOLED RSLT: CPT | Performed by: PHYSICIAN ASSISTANT

## 2022-05-31 PROCEDURE — 87210 SMEAR WET MOUNT SALINE/INK: CPT | Performed by: PHYSICIAN ASSISTANT

## 2022-05-31 RX ORDER — FLUCONAZOLE 150 MG/1
150 TABLET ORAL DAILY
Qty: 3 TABLET | Refills: 0 | Status: SHIPPED | OUTPATIENT
Start: 2022-05-31 | End: 2022-06-03

## 2022-05-31 ASSESSMENT — PAIN SCALES - GENERAL: PAINLEVEL: NO PAIN (1)

## 2022-05-31 NOTE — PROGRESS NOTES
Assessment & Plan     Vaginal sore  Her wet prep did come back positive for yeast, suspect that the sore is also included in this as the vaginal tissue is slightly erythematous and slightly edematous.  Will treat with oral Diflucan. Can use barrier cream/ointment if needed right now over the area to avoid additional irritation. If not improving recommend seeing OB/GYN for evaluation.   - Wet prep  - fluconazole (DIFLUCAN) 150 MG tablet; Take 1 tablet (150 mg) by mouth daily for 3 days    Pap smear for cervical cancer screening  Updated pap smear, no concerns on cervical examination.   - Pap screen with HPV - recommended age 30 - 65 years      Return in about 1 week (around 6/7/2022) for If not improving, sooner if worse or new concerns.     Options for treatment and follow-up care were reviewed with the patient and/or guardian. Patient and/or guardian engaged in the decision making process and verbalized understanding of the options discussed and agreed with the final plan.     Courtney Rockwell PA-C  [facility] RENETTA Young is a 52 year old who presents for the following health issues     History of Present Illness       Reason for visit:  Soreness/ pain in woman s area  Symptom onset:  1-3 days ago  Symptoms include:  Pain  Symptom intensity:  Moderate  Symptom progression:  Staying the same  Had these symptoms before:  No  What makes it worse:  Urinating  What makes it better:  No    She eats 2-3 servings of fruits and vegetables daily.She consumes 0 sweetened beverage(s) daily.She exercises with enough effort to increase her heart rate 20 to 29 minutes per day.  She exercises with enough effort to increase her heart rate 3 or less days per week.   She is taking medications regularly.     - Has had a sore inside the vaginal for a few days, it hurts when urine touches the area but it doesn't hurt to urinate. Also hurts to wipe  - no blood  - Has discharge but not sure what is just  "normal or not.   - No vaginal itching.   - No odor.   - No irregular bleeding.   - She is starting to have hot flashes so assumes she is going through perimenopause.   - no history of STDs or herpes infection.     Review of Systems   Constitutional, gi and gu systems are negative, except as otherwise noted.      Objective    /70   Pulse 60   Temp 97.7  F (36.5  C)   Resp 16   Ht 1.62 m (5' 3.78\")   Wt 99.8 kg (220 lb)   SpO2 99%   Breastfeeding No   BMI 38.02 kg/m    Body mass index is 38.02 kg/m .  Physical Exam   GENERAL: healthy, alert and no distress   (female): normal urethral meatus , vaginal mucosa pink, moist, well rugated, normal cervix, adnexae, and uterus without masses. and There are IUD strings noted.  Superior portion of the vagina just inferior to the clitoris and urethral meatus there is a small superficial sore without vesicular or papular formation approximately 8 mm long.  There is generalized erythema of the vulva and anterior vagina.   MS: no gross musculoskeletal defects noted, no edema  SKIN: no suspicious lesions or rashes  PSYCH: mentation appears normal, affect normal/bright    Results for orders placed or performed in visit on 05/31/22   Wet prep     Status: Abnormal    Specimen: Vagina; Swab   Result Value Ref Range    Trichomonas Absent Absent    Yeast Present (A) Absent    Clue Cells Absent Absent    WBCs/high power field 1+ (A) None               "

## 2022-05-31 NOTE — PROGRESS NOTES
"  {PROVIDER CHARTING PREFERENCE:411044}    Filemon Young is a 52 year old who presents for the following health issues     Vaginal Problem     History of Present Illness       Reason for visit:  Soreness/ pain in woman s area  Symptom onset:  1-3 days ago  Symptoms include:  Pain  Symptom intensity:  Moderate  Symptom progression:  Staying the same  Had these symptoms before:  No  What makes it worse:  Urinating  What makes it better:  No    She eats 2-3 servings of fruits and vegetables daily.She consumes 0 sweetened beverage(s) daily.She exercises with enough effort to increase her heart rate 20 to 29 minutes per day.  She exercises with enough effort to increase her heart rate 3 or less days per week.   She is taking medications regularly.       {SUPERLIST (Optional):232100}  {additonal problems for provider to add (Optional):937475}    Review of Systems   Genitourinary: Positive for vaginal discharge.      {ROS COMP (Optional):659048}      Objective    /70   Pulse 60   Temp 97.7  F (36.5  C)   Resp 16   Ht 1.62 m (5' 3.78\")   Wt 99.8 kg (220 lb)   SpO2 99%   Breastfeeding No   BMI 38.02 kg/m    Body mass index is 38.02 kg/m .  Physical Exam   {Exam List (Optional):170692}    {Diagnostic Test Results (Optional):174381}    {AMBULATORY ATTESTATION (Optional):845615}        "

## 2022-06-02 ENCOUNTER — HOSPITAL ENCOUNTER (OUTPATIENT)
Dept: PHYSICAL THERAPY | Facility: CLINIC | Age: 52
Setting detail: THERAPIES SERIES
Discharge: HOME | End: 2022-06-02
Attending: PHYSICIAN ASSISTANT
Payer: OTHER MISCELLANEOUS

## 2022-06-02 LAB
BKR LAB AP GYN ADEQUACY: NORMAL
BKR LAB AP GYN INTERPRETATION: NORMAL
BKR LAB AP HPV REFLEX: NORMAL
BKR LAB AP PREVIOUS ABNORMAL: NORMAL
PATH REPORT.COMMENTS IMP SPEC: NORMAL
PATH REPORT.COMMENTS IMP SPEC: NORMAL
PATH REPORT.RELEVANT HX SPEC: NORMAL

## 2022-06-02 PROCEDURE — 97140 MANUAL THERAPY 1/> REGIONS: CPT | Mod: GP | Performed by: PHYSICAL THERAPIST

## 2022-06-02 PROCEDURE — 97110 THERAPEUTIC EXERCISES: CPT | Mod: GP | Performed by: PHYSICAL THERAPIST

## 2022-06-02 NOTE — PROGRESS NOTES
[facility]    Outpatient Physical Therapy Discharge Note  Patient: Hannah Krishna  : 1970    Beginning/End Dates of Reporting Period:  2022 to 2022    Referring Provider: anson patel PAC     Therapy Diagnosis: T9-10 hemilamenectomy      Client Self Report: ,hopes to return to work casual    Objective Measurements:  Objective Measure: at eval pain 2-6/10 JESSICA 20  Details: currently 2022 JESSICA 8.89 pain 0-2/10 not pain but muscle tightness      patient seen for 11 Rx sessions for exercises in clinic and instruction in HEP and in clinic manual therapy at last Rx she was completing the following reviewed HEP and is going well B Rhomboid stretch in doorway x 10 seconds x5, cat-cow x 30 at countertop, thread the needle x 30 at countertop,  scap retraction 10 x 5 seconds, TA set 30 x 5 seconds, supine bent knee trunk rotation x 30.  in clinic nustep level 5 x 15, bosu at ll bars x 5 minutes , blue  theraband B shoulder extension 15x           Goals:  Goal Identifier HEP   Goal Description Pt will be independent with HEP in order to improve postural and core stabilization.   Target Date 06/10/22   Date Met      Progress (detail required for progress note): very compliant with HEP     Goal Identifier Work simulation   Goal Description Pt will demonstrate safe technique with all work simulation activities with no significant increase in pain in order to resume prior level of function at work.   Target Date 06/10/22   Date Met      Progress (detail required for progress note): good understanding of body mechanics     Goal Identifier JESSICA   Goal Description Pt will demonstrate 10% improvement of JESSICA in order to demonstrate functional improvement of back.   Target Date 06/10/22   Date Met      Progress (detail required for progress note): 8.89     Plan:  Discharge from therapy.    Discharge:    Reason for  Discharge: Patient has met all goals.    Equipment Issued: none    Discharge Plan: Patient to continue home program.

## 2022-06-03 ENCOUNTER — OFFICE VISIT (OUTPATIENT)
Dept: NEUROSURGERY | Facility: CLINIC | Age: 52
End: 2022-06-03
Payer: OTHER MISCELLANEOUS

## 2022-06-03 VITALS
SYSTOLIC BLOOD PRESSURE: 114 MMHG | TEMPERATURE: 97.8 F | BODY MASS INDEX: 37.03 KG/M2 | DIASTOLIC BLOOD PRESSURE: 64 MMHG | HEIGHT: 64 IN | WEIGHT: 216.9 LBS

## 2022-06-03 DIAGNOSIS — Z98.890 S/P SPINAL SURGERY: Primary | ICD-10-CM

## 2022-06-03 PROCEDURE — 99024 POSTOP FOLLOW-UP VISIT: CPT | Performed by: PHYSICIAN ASSISTANT

## 2022-06-03 ASSESSMENT — PAIN SCALES - GENERAL: PAINLEVEL: NO PAIN (0)

## 2022-06-03 NOTE — LETTER
6/3/2022         RE: Hannah Krishna  [address]  [facility] [address]        Dear Colleague,    Thank you for referring your patient, Hannah Krishna, to the [facility]OS[facility] [place]. Please see a copy of my visit note below.    Neurosurgery Clinic  Neurosurgery followup:    HPI: Hannah is 12 weeks post T9-10 laminotomy and decompression.  She is doing very well.  She has no radicular pain or paresthesias.  She is not using any medication on a regular basis.  She is interested in going back to work.      Exam:  Constitutional:  Alert, well nourished, NAD.  HEENT: Normocephalic, atraumatic.   Pulm:  Without shortness of breath   CV:  No pitting edema of BLE.      Neurological:  Awake  Alert  Oriented x 3  Motor exam:        IP Q DF PF EHL  R   5  5   5   5    5  L   5  5   5   5    5     Reflexes are 2+ in the patellar and Achilles. There is no clonus. Downgoing Babinski.      Able to spontaneously move L/E bilaterally  Sensation intact throughout all L/E dermatomes      A/P:    3-month status post T9-10 hemilaminotomy and foraminotomy.  Her right-sided radicular type pain has resolved.  She has no ongoing concerns.  She would like to go back to work, and we discussed returning to work on a part-time basis over the next couple of weeks, and if things go well, she can then return on a normal schedule.  She was in agreement.  Her QRC was also present and was in agreement.  We will see her back on an as-needed basis.    - Wean from brace per patient comfort     - Call the clinic at [phone] for increasing redness, swelling or pus draining from the incision, increased pain or any other questions and concerns.      Phillip Leblanc PA-C  [facility]  [address]  [address] [place] [address]    Tel [phone]  Pager [phone]      The use of Dragon/[employer] dictation services may have been used to construct the content in this  note; any grammatical or spelling errors are non-intentional. Please contact the author of this note directly if you are in need of any clarification.        Again, thank you for allowing me to participate in the care of your patient.        Sincerely,        Phillip Leblanc PA-C

## 2022-06-03 NOTE — PROGRESS NOTES
Neurosurgery Clinic  Neurosurgery followup:    HPI: Hannah is 12 weeks post T9-10 laminotomy and decompression.  She is doing very well.  She has no radicular pain or paresthesias.  She is not using any medication on a regular basis.  She is interested in going back to work.      Exam:  Constitutional:  Alert, well nourished, NAD.  HEENT: Normocephalic, atraumatic.   Pulm:  Without shortness of breath   CV:  No pitting edema of BLE.      Neurological:  Awake  Alert  Oriented x 3  Motor exam:        IP Q DF PF EHL  R   5  5   5   5    5  L   5  5   5   5    5     Reflexes are 2+ in the patellar and Achilles. There is no clonus. Downgoing Babinski.      Able to spontaneously move L/E bilaterally  Sensation intact throughout all L/E dermatomes      A/P:    3-month status post T9-10 hemilaminotomy and foraminotomy.  Her right-sided radicular type pain has resolved.  She has no ongoing concerns.  She would like to go back to work, and we discussed returning to work on a part-time basis over the next couple of weeks, and if things go well, she can then return on a normal schedule.  She was in agreement.  Her QRC was also present and was in agreement.  We will see her back on an as-needed basis.    - Wean from brace per patient comfort     - Call the clinic at [phone] for increasing redness, swelling or pus draining from the incision, increased pain or any other questions and concerns.      Phillip Leblanc PA-C  [facility]  [address] [address]    Tel [phone]  Pager [phone]      The use of Dragon/[employer] services may have been used to construct the content in this note; any grammatical or spelling errors are non-intentional. Please contact the author of this note directly if you are in need of any clarification.

## 2022-06-03 NOTE — LETTER
Bee 3, 2022      Hannah Krishna  [id number] [address] [address]        To Whom It May Concern:    Hannah Krishna was seen in our clinic. She may return to work with the following: limited to 4 hour workday for 2 more weeks through 6/17/22 then can work with no restrictions going forward starting 6/18/22.      Sincerely,        Phillip Leblanc PA-C

## 2022-06-04 ENCOUNTER — HEALTH MAINTENANCE LETTER (OUTPATIENT)
Age: 52
End: 2022-06-04

## 2022-06-07 LAB
HUMAN PAPILLOMA VIRUS 16 DNA: NEGATIVE
HUMAN PAPILLOMA VIRUS 18 DNA: NEGATIVE
HUMAN PAPILLOMA VIRUS FINAL DIAGNOSIS: NORMAL
HUMAN PAPILLOMA VIRUS OTHER HR: NEGATIVE

## 2022-08-09 ENCOUNTER — OFFICE VISIT (OUTPATIENT)
Dept: FAMILY MEDICINE | Facility: OTHER | Age: 52
End: 2022-08-09
Payer: COMMERCIAL

## 2022-08-09 ENCOUNTER — ANCILLARY PROCEDURE (OUTPATIENT)
Dept: GENERAL RADIOLOGY | Facility: OTHER | Age: 52
End: 2022-08-09
Attending: PHYSICIAN ASSISTANT
Payer: COMMERCIAL

## 2022-08-09 VITALS
WEIGHT: 223 LBS | SYSTOLIC BLOOD PRESSURE: 118 MMHG | DIASTOLIC BLOOD PRESSURE: 62 MMHG | BODY MASS INDEX: 38.54 KG/M2 | RESPIRATION RATE: 17 BRPM | TEMPERATURE: 97.7 F | OXYGEN SATURATION: 97 % | HEART RATE: 68 BPM

## 2022-08-09 DIAGNOSIS — R10.32 LLQ ABDOMINAL PAIN: ICD-10-CM

## 2022-08-09 DIAGNOSIS — R10.32 LLQ ABDOMINAL PAIN: Primary | ICD-10-CM

## 2022-08-09 PROCEDURE — 99213 OFFICE O/P EST LOW 20 MIN: CPT | Performed by: PHYSICIAN ASSISTANT

## 2022-08-09 PROCEDURE — 74019 RADEX ABDOMEN 2 VIEWS: CPT | Mod: TC | Performed by: RADIOLOGY

## 2022-08-09 ASSESSMENT — PAIN SCALES - GENERAL: PAINLEVEL: NO PAIN (1)

## 2022-08-09 NOTE — PROGRESS NOTES
Assessment & Plan     [facility] abdominal pain  Differential diagnosis includes but not limited to muscular etiology, diverticulitis, constipation, IBS, inflammatory bowel, ovarian pathology.  She has no acute infectious symptoms so diverticulitis less likely.  We did obtain x-ray and waiting on final results.  Based on exam suspect it is more related to muscle and can try doing some physical therapy exercises and stretches, NSAIDs to see if it improves.  If not improving or entirely resolving consider US Pelvic and if negative consider CT Abdomen/pelvis +/- Colonoscopy.   - XR Abdomen 2 Views; Future        Return in about 2 weeks (around 8/23/2022) for If not improving, sooner if worse or new concerns.     Options for treatment and follow-up care were reviewed with the patient and/or guardian. Patient and/or guardian engaged in the decision making process and verbalized understanding of the options discussed and agreed with the final plan.    NEWTON [facility] KIKE Young is a 52 year old, presenting for the following health issues:  Abdominal Pain      History of Present Illness       Reason for visit:  Soreness on left side abdomen    She eats 2-3 servings of fruits and vegetables daily.She consumes 0 sweetened beverage(s) daily.She exercises with enough effort to increase her heart rate 10 to 19 minutes per day.  She exercises with enough effort to increase her heart rate 4 days per week.   She is taking medications regularly.    Today's PHQ-9         PHQ-9 Total Score: 0    PHQ-9 Q9 Thoughts of better off dead/self-harm past 2 weeks :   Not at all    How difficult have these problems made it for you to do your work, take care of things at home, or get along with other people: Not difficult at all       Concern - Abdominal pain   Onset: 2 months   Description: left lower abdominal pain   Intensity: mild  Progression of Symptoms:  improving  Accompanying Signs &  Symptoms: none  Previous history of similar problem:    Precipitating factors:        Worsened by: moving   Alleviating factors:        Improved by: resting   Therapies tried and outcome: None    - Started a couple of months ago, started to intensify but more recently seems to be improving.   - Worse with rolling over in bed and position changes like going from sitting to standing.   - Has moved it is always in the LLQ.   - No bowel movement changes, diarrhea, constipation, blood in stool. No urinary symptoms - blood in urine, urinary frequency.  She still deals with the bladder weakness symptoms and needs to get back in with physical therapy but no new symptoms.  No irregular vaginal bleeding or discharge changes.    - No changes in activity or injury. No masses in the area.     Review of Systems   Constitutional, HEENT, cardiovascular, pulmonary, gi and gu systems are negative, except as otherwise noted.      Objective    /62   Pulse 68   Temp 97.7  F (36.5  C) (Temporal)   Resp 17   Wt 101.2 kg (223 lb)   SpO2 97%   BMI 38.54 kg/m    Body mass index is 38.54 kg/m .  Physical Exam   GENERAL: healthy, alert and no distress  RESP: lungs clear to auscultation - no rales, rhonchi or wheezes  CV: regular rate and rhythm, normal S1 S2, no S3 or S4, no murmur, click or rub, no peripheral edema and peripheral pulses strong  ABDOMEN: bowel sounds normal and soft, non-distended, mildly tender to palpation in the LLQ worse with valsalva  MS: no gross musculoskeletal defects noted, no edema  PSYCH: mentation appears normal, affect normal/bright    No results found for any visits on 08/09/22.                .  ..

## 2022-09-26 DIAGNOSIS — F33.1 MAJOR DEPRESSIVE DISORDER, RECURRENT EPISODE, MODERATE (H): ICD-10-CM

## 2022-09-27 RX ORDER — BUPROPION HYDROCHLORIDE 300 MG/1
TABLET ORAL
Qty: 90 TABLET | Refills: 1 | Status: SHIPPED | OUTPATIENT
Start: 2022-09-27 | End: 2023-03-27

## 2022-09-27 RX ORDER — ESCITALOPRAM OXALATE 10 MG/1
TABLET ORAL
Qty: 90 TABLET | Refills: 1 | Status: SHIPPED | OUTPATIENT
Start: 2022-09-27 | End: 2023-02-13

## 2022-10-09 ENCOUNTER — HEALTH MAINTENANCE LETTER (OUTPATIENT)
Age: 52
End: 2022-10-09

## 2022-10-25 DIAGNOSIS — F41.9 ANXIETY: ICD-10-CM

## 2022-10-25 NOTE — TELEPHONE ENCOUNTER
Patient requesting refill.  Has been taking care of her mother and needs for stress/anxiety.   Her mother will be going to [facility] in [place] on Nov 14th.      -Juani Haywood, Pharm.D., [facility], [phone]

## 2022-10-27 NOTE — TELEPHONE ENCOUNTER
Pending Prescriptions:                       Disp   Refills    hydrOXYzine (ATARAX) 25 MG tablet          90 tab*0        Sig: Take 1 tablet (25 mg) by mouth 3 times daily as           needed for anxiety        Routing refill request to provider for review/approval because:  Drug not active on patient's medication list.   Patient is requesting refill due to caring for her mother.   Leandro Oliveira, AMANUELN, RN, PHN  [facility]  October 27, 2022

## 2022-10-28 RX ORDER — HYDROXYZINE HYDROCHLORIDE 25 MG/1
25 TABLET, FILM COATED ORAL 3 TIMES DAILY PRN
Qty: 90 TABLET | Refills: 0 | Status: SHIPPED | OUTPATIENT
Start: 2022-10-28

## 2022-11-10 ENCOUNTER — TELEPHONE (OUTPATIENT)
Dept: FAMILY MEDICINE | Facility: OTHER | Age: 52
End: 2022-11-10

## 2022-11-10 NOTE — TELEPHONE ENCOUNTER
Pt has a visit scheduled for tomorrow for removal of sutures. Laceration to right heel. Recommended removal in 7-10 days. This was placed at [facility] on 11/2/22.     Needs a provider visit.     Huddled with providers.     ES indicated that she could see pt. Appointment changed.    Sandra Pittman, AMANUELN, RN, PHN  Registered Nurse-Clinic Triage  [facility]  11/10/2022 at 2:51 PM

## 2022-11-10 NOTE — TELEPHONE ENCOUNTER
Contacted pt and advised her of the change. She was advised that this would be a visit with a charge instead of no charge. She is in agreement with this and denies further questions.     Sandra Pittman, AMANUELN, RN, PHN  Registered Nurse-Clinic Triage  [facility]  11/10/2022 at 3:29 PM

## 2022-11-11 ENCOUNTER — OFFICE VISIT (OUTPATIENT)
Dept: FAMILY MEDICINE | Facility: OTHER | Age: 52
End: 2022-11-11
Payer: COMMERCIAL

## 2022-11-11 VITALS
SYSTOLIC BLOOD PRESSURE: 102 MMHG | OXYGEN SATURATION: 99 % | TEMPERATURE: 98.3 F | WEIGHT: 219 LBS | HEART RATE: 63 BPM | DIASTOLIC BLOOD PRESSURE: 64 MMHG | BODY MASS INDEX: 37.39 KG/M2 | RESPIRATION RATE: 16 BRPM | HEIGHT: 64 IN

## 2022-11-11 DIAGNOSIS — S91.311D LACERATION OF RIGHT FOOT, SUBSEQUENT ENCOUNTER: Primary | ICD-10-CM

## 2022-11-11 PROCEDURE — 99212 OFFICE O/P EST SF 10 MIN: CPT | Performed by: PHYSICIAN ASSISTANT

## 2022-11-11 ASSESSMENT — PAIN SCALES - GENERAL: PAINLEVEL: NO PAIN (0)

## 2022-11-11 NOTE — PROGRESS NOTES
"  Assessment & Plan     Laceration of right foot, subsequent encounter  The sutures were removed without complication, wound is healing well, no signs of infection present.  Continue to monitor for any complications.   - REMOVAL OF SUTURES      Return in about 4 weeks (around 12/9/2022) for Physical Exam.     Options for treatment and follow-up care were reviewed with the patient and/or guardian. Patient and/or guardian engaged in the decision making process and verbalized understanding of the options discussed and agreed with the final plan.      Courtney Rockwell PA-C  [facility] RENETTA Young is a 52 year old, presenting for the following health issues:  Suture Removal      History of Present Illness       Reason for visit:  Get stitches out    She eats 2-3 servings of fruits and vegetables daily.She consumes 0 sweetened beverage(s) daily.She exercises with enough effort to increase her heart rate 20 to 29 minutes per day.  She exercises with enough effort to increase her heart rate 6 days per week.   She is taking medications regularly.     Laceration happened on 11/2/2022. She was walking outside to enjoy the nice day with her mom when the screen door caught the back of her foot.    She was taken to Urgent care at [facility] and sutures were placed.   She has not had any abnormalities in function. No numbness/tingling sensations.   No fevers or chills.    No drainage from the area.     Review of Systems   Constitutional, skin, msk, neuro systems are negative, except as otherwise noted.      Objective    /64   Pulse 63   Temp 98.3  F (36.8  C) (Temporal)   Resp 16   Ht 1.619 m (5' 3.75\")   Wt 99.3 kg (219 lb)   LMP 10/10/2022 (Approximate)   SpO2 99%   BMI 37.89 kg/m    Body mass index is 37.89 kg/m .  Physical Exam   GENERAL: healthy, alert and no distress  MS: no gross musculoskeletal defects noted, no edema  SKIN: Right lateral surface of the heel there is a " laceration that has 6 simple interrupted sutures in place, there is very mild pink coloration posteriorly without edema or drainage.  No fluctuance or excessive warmth.      The area was cleansed with alcohol wipes. The 6 simple interrupted sutures were removed without complication and the laceration appears to be healing well, there is some very mild separation inferiorly so a steri strip was applied with a bandage.  Patient tolerated well.

## 2022-11-23 ENCOUNTER — DOCUMENTATION ONLY (OUTPATIENT)
Dept: NEUROSURGERY | Facility: CLINIC | Age: 52
End: 2022-11-23

## 2022-11-23 NOTE — PROGRESS NOTES
[facility] MMI PPD form completed. In RN office for review before giving to ANAY team.

## 2023-02-13 ENCOUNTER — OFFICE VISIT (OUTPATIENT)
Dept: FAMILY MEDICINE | Facility: OTHER | Age: 53
End: 2023-02-13
Payer: COMMERCIAL

## 2023-02-13 ENCOUNTER — LAB (OUTPATIENT)
Dept: FAMILY MEDICINE | Facility: OTHER | Age: 53
End: 2023-02-13

## 2023-02-13 VITALS
HEART RATE: 63 BPM | SYSTOLIC BLOOD PRESSURE: 120 MMHG | OXYGEN SATURATION: 98 % | DIASTOLIC BLOOD PRESSURE: 68 MMHG | HEIGHT: 64 IN | RESPIRATION RATE: 20 BRPM | WEIGHT: 222 LBS | TEMPERATURE: 96.9 F | BODY MASS INDEX: 37.9 KG/M2

## 2023-02-13 DIAGNOSIS — Z12.11 SPECIAL SCREENING FOR MALIGNANT NEOPLASMS, COLON: ICD-10-CM

## 2023-02-13 DIAGNOSIS — E66.01 SEVERE OBESITY (BMI 35.0-39.9) WITH COMORBIDITY (H): ICD-10-CM

## 2023-02-13 DIAGNOSIS — Z12.31 ENCOUNTER FOR SCREENING MAMMOGRAM FOR BREAST CANCER: ICD-10-CM

## 2023-02-13 DIAGNOSIS — Z11.4 SCREENING FOR HUMAN IMMUNODEFICIENCY VIRUS: ICD-10-CM

## 2023-02-13 DIAGNOSIS — F33.1 MAJOR DEPRESSIVE DISORDER, RECURRENT EPISODE, MODERATE (H): ICD-10-CM

## 2023-02-13 DIAGNOSIS — R53.83 FATIGUE, UNSPECIFIED TYPE: Primary | ICD-10-CM

## 2023-02-13 LAB
ALBUMIN SERPL BCG-MCNC: 4.3 G/DL (ref 3.5–5.2)
ALP SERPL-CCNC: 76 U/L (ref 35–104)
ALT SERPL W P-5'-P-CCNC: 16 U/L (ref 10–35)
ANION GAP SERPL CALCULATED.3IONS-SCNC: 11 MMOL/L (ref 7–15)
AST SERPL W P-5'-P-CCNC: 19 U/L (ref 10–35)
BASOPHILS # BLD AUTO: 0 10E3/UL (ref 0–0.2)
BASOPHILS NFR BLD AUTO: 0 %
BILIRUB SERPL-MCNC: 0.6 MG/DL
BUN SERPL-MCNC: 15.8 MG/DL (ref 6–20)
CALCIUM SERPL-MCNC: 10 MG/DL (ref 8.6–10)
CHLORIDE SERPL-SCNC: 100 MMOL/L (ref 98–107)
CREAT SERPL-MCNC: 1.14 MG/DL (ref 0.51–0.95)
DEPRECATED CALCIDIOL+CALCIFEROL SERPL-MC: 44 UG/L (ref 20–75)
DEPRECATED HCO3 PLAS-SCNC: 26 MMOL/L (ref 22–29)
EOSINOPHIL # BLD AUTO: 0.1 10E3/UL (ref 0–0.7)
EOSINOPHIL NFR BLD AUTO: 2 %
ERYTHROCYTE [DISTWIDTH] IN BLOOD BY AUTOMATED COUNT: 13.2 % (ref 10–15)
FERRITIN SERPL-MCNC: 46 NG/ML (ref 11–328)
GFR SERPL CREATININE-BSD FRML MDRD: 58 ML/MIN/1.73M2
GLUCOSE SERPL-MCNC: 94 MG/DL (ref 70–99)
HCT VFR BLD AUTO: 40.7 % (ref 35–47)
HGB BLD-MCNC: 13.5 G/DL (ref 11.7–15.7)
HIV 1+2 AB+HIV1 P24 AG SERPL QL IA: NONREACTIVE
LYMPHOCYTES # BLD AUTO: 1.3 10E3/UL (ref 0.8–5.3)
LYMPHOCYTES NFR BLD AUTO: 24 %
MCH RBC QN AUTO: 30 PG (ref 26.5–33)
MCHC RBC AUTO-ENTMCNC: 33.2 G/DL (ref 31.5–36.5)
MCV RBC AUTO: 90 FL (ref 78–100)
MONOCYTES # BLD AUTO: 0.4 10E3/UL (ref 0–1.3)
MONOCYTES NFR BLD AUTO: 7 %
NEUTROPHILS # BLD AUTO: 3.6 10E3/UL (ref 1.6–8.3)
NEUTROPHILS NFR BLD AUTO: 67 %
PLATELET # BLD AUTO: 318 10E3/UL (ref 150–450)
POTASSIUM SERPL-SCNC: 4.6 MMOL/L (ref 3.4–5.3)
PROT SERPL-MCNC: 7.3 G/DL (ref 6.4–8.3)
RBC # BLD AUTO: 4.5 10E6/UL (ref 3.8–5.2)
SODIUM SERPL-SCNC: 137 MMOL/L (ref 136–145)
TSH SERPL DL<=0.005 MIU/L-ACNC: 3.4 UIU/ML (ref 0.3–4.2)
VIT B12 SERPL-MCNC: 412 PG/ML (ref 232–1245)
WBC # BLD AUTO: 5.3 10E3/UL (ref 4–11)

## 2023-02-13 PROCEDURE — 99214 OFFICE O/P EST MOD 30 MIN: CPT | Performed by: PHYSICIAN ASSISTANT

## 2023-02-13 PROCEDURE — 82607 VITAMIN B-12: CPT | Performed by: PHYSICIAN ASSISTANT

## 2023-02-13 PROCEDURE — 36415 COLL VENOUS BLD VENIPUNCTURE: CPT | Performed by: PHYSICIAN ASSISTANT

## 2023-02-13 PROCEDURE — 87389 HIV-1 AG W/HIV-1&-2 AB AG IA: CPT | Performed by: PHYSICIAN ASSISTANT

## 2023-02-13 PROCEDURE — 82728 ASSAY OF FERRITIN: CPT | Performed by: PHYSICIAN ASSISTANT

## 2023-02-13 PROCEDURE — 80050 GENERAL HEALTH PANEL: CPT | Performed by: PHYSICIAN ASSISTANT

## 2023-02-13 PROCEDURE — 82306 VITAMIN D 25 HYDROXY: CPT | Performed by: PHYSICIAN ASSISTANT

## 2023-02-13 RX ORDER — ESCITALOPRAM OXALATE 10 MG/1
15 TABLET ORAL DAILY
Qty: 135 TABLET | Refills: 0 | Status: SHIPPED | OUTPATIENT
Start: 2023-02-13 | End: 2023-04-28

## 2023-02-13 ASSESSMENT — ENCOUNTER SYMPTOMS: FATIGUE: 1

## 2023-02-13 ASSESSMENT — PATIENT HEALTH QUESTIONNAIRE - PHQ9
10. IF YOU CHECKED OFF ANY PROBLEMS, HOW DIFFICULT HAVE THESE PROBLEMS MADE IT FOR YOU TO DO YOUR WORK, TAKE CARE OF THINGS AT HOME, OR GET ALONG WITH OTHER PEOPLE: NOT DIFFICULT AT ALL
SUM OF ALL RESPONSES TO PHQ QUESTIONS 1-9: 2
SUM OF ALL RESPONSES TO PHQ QUESTIONS 1-9: 2

## 2023-02-13 NOTE — PROGRESS NOTES
"  Assessment & Plan   1. Fatigue, unspecified type    2. Major depressive disorder, recurrent episode, moderate (H)    3. Severe obesity (BMI 35.0-39.9) with comorbidity (H)    4. Encounter for screening mammogram for breast cancer    5. Special screening for malignant neoplasms, colon    6. Screening for human immunodeficiency virus      Discussed differential for patient's symptoms including ANKIT, stress affecting depression/anxiety, vitamin D or similar deficiency, menopausal changes or other cause.     Patient was agreeable to lab workup today to evaluate for deficiency or other causes such as diabetes. I will notify her of the results.     She was less receptive to the possibility that this could be ANKIT. Discussed reasoning including daytime sleepiness, frequent napping, recent weight gain. She was agreeable to referral and states that she will schedule if alternative cause cannot be determined.     Will have patient increase lexapro to 15mg daily and monitor for improvement of symptoms. She can follow up with myself or PCP pending response.     She informs me that she is planning to travel to [place] within the next 1-2 weeks for plastic surgery or \"mommy makeover\". I advised her to reach out to her surgeon to discuss what prep they require for this procedure. She may need a pre-operative appointment. Patient was also advised to reach out to PCP to inquire as to who can she follow up with on her return for suture removal, etc.      - Vitamin D Deficiency; Future  - Ferritin; Future  - Vitamin B12; Future  - Comprehensive metabolic panel (BMP + Alb, Alk Phos, ALT, AST, Total. Bili, TP); Future  - CBC with platelets and differential; Future  - TSH with free T4 reflex; Future  - Adult Sleep Eval & Management  Referral; Future  - Vitamin D Deficiency  - Ferritin  - Vitamin B12  - Comprehensive metabolic panel (BMP + Alb, Alk Phos, ALT, AST, Total. Bili, TP)  - CBC with platelets and differential  - TSH with " "free T4 reflex    Encounter for screening mammogram for breast cancer  Screening for human immunodeficiency virus  Special screening for malignant neoplasms, colon  Orders placed. Patient will be notified of the lab results and will schedule screening at her convenience.   - *MA Screening Digital Bilateral; Future  - COLOGUARD(EXACT SCIENCES); Future  - HIV Antigen Antibody Combo; Future  - HIV Antigen Antibody Combo    BMI:   Estimated body mass index is 38.11 kg/m  as calculated from the following:    Height as of this encounter: 1.626 m (5' 4\").    Weight as of this encounter: 100.7 kg (222 lb).   Weight management plan: Discussed healthy diet and exercise guidelines    NEWTON Gut[facility] KIKE Young is a 53 year old, presenting for the following health issues:  Fatigue (Super tired throughout the day)      Fatigue  Associated symptoms include fatigue.   History of Present Illness       Reason for visit:  Tired    She eats 2-3 servings of fruits and vegetables daily.She consumes 0 sweetened beverage(s) daily.She exercises with enough effort to increase her heart rate 20 to 29 minutes per day.  She exercises with enough effort to increase her heart rate 3 or less days per week.   She is taking medications regularly.    Today's PHQ-9         PHQ-9 Total Score: 2    PHQ-9 Q9 Thoughts of better off dead/self-harm past 2 weeks :   Not at all    How difficult have these problems made it for you to do your work, take care of things at home, or get along with other people: Not difficult at all     Patient informs me that she has been struggling with increased stress and feeling fatigued for several months. Estimates that much of her stress began around July 2022 when her mother's health took a turn for the worse following fall. Mother was hospitalized and spent some time in TCU, but was discharged home with need for frequent home care. 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 It sounds as if this was quite difficult for her and resulted in increased emotional, mental and physical stress. She recalls that she slept on mother's sofa, so she did not feel her sleep was very good. Her mother has been since transitioned to memory care unit. Additional stressors included helping daughter plan her wedding and weight gain.     Over this past winter the patient estimates that she will go to bed around 10pm and wake between 9am-10am. Her morning routine is to eat breakfast, but states that shortly after this she will fall asleep again. She says that she will easily fall asleep throughout the day. She had undergone polysomnogram in 2005 as she was struggling with staying awake while driving. From what I can see, per past records, it does not appear that she met ANKIT criteria.     Review of Systems   Constitutional: Positive for fatigue.      Constitutional, HEENT, cardiovascular, pulmonary, gi and gu systems are negative, except as otherwise noted.      Objective    /68 (BP Location: Right arm, Patient Position: Sitting, Cuff Size: Adult Regular)   Pulse 63   Temp 96.9  F (36.1  C) (Temporal)   Resp 20   Ht 1.626 m (5' 4\")   Wt 100.7 kg (222 lb)   SpO2 98%   BMI 38.11 kg/m    Body mass index is 38.11 kg/m .  Physical Exam   GENERAL: healthy, alert and no distress  EYES: Eyes grossly normal to inspection, PERRL and conjunctivae and sclerae normal  HENT: ear canals and TM's normal, nose and mouth without ulcers or lesions  NECK: no adenopathy, no asymmetry, masses, or scars and thyroid normal to palpation  RESP: lungs clear to auscultation - no rales, rhonchi or wheezes  CV: regular rate and rhythm, normal S1 S2, no S3 or S4, no murmur, click or rub, no peripheral edema and peripheral pulses strong  ABDOMEN: soft, nontender, no hepatosplenomegaly, no masses and bowel sounds normal  MS: no gross musculoskeletal defects noted, no edema  NEURO: Normal strength " and tone, mentation intact and speech normal  PSYCH: mentation appears normal, affect normal/bright    Results for orders placed or performed in visit on 02/13/23   CBC with platelets and differential     Status: None   Result Value Ref Range    WBC Count 5.3 4.0 - 11.0 10e3/uL    RBC Count 4.50 3.80 - 5.20 10e6/uL    Hemoglobin 13.5 11.7 - 15.7 g/dL    Hematocrit 40.7 35.0 - 47.0 %    MCV 90 78 - 100 fL    MCH 30.0 26.5 - 33.0 pg    MCHC 33.2 31.5 - 36.5 g/dL    RDW 13.2 10.0 - 15.0 %    Platelet Count 318 150 - 450 10e3/uL    % Neutrophils 67 %    % Lymphocytes 24 %    % Monocytes 7 %    % Eosinophils 2 %    % Basophils 0 %    Absolute Neutrophils 3.6 1.6 - 8.3 10e3/uL    Absolute Lymphocytes 1.3 0.8 - 5.3 10e3/uL    Absolute Monocytes 0.4 0.0 - 1.3 10e3/uL    Absolute Eosinophils 0.1 0.0 - 0.7 10e3/uL    Absolute Basophils 0.0 0.0 - 0.2 10e3/uL   CBC with platelets and differential     Status: None    Narrative    The following orders were created for panel order CBC with platelets and differential.  Procedure                               Abnormality         Status                     ---------                               -----------         ------                     CBC with platelets and d...[[id number]]                      Final result                 Please view results for these tests on the individual orders.

## 2023-02-18 ENCOUNTER — HEALTH MAINTENANCE LETTER (OUTPATIENT)
Age: 53
End: 2023-02-18

## 2023-03-03 ENCOUNTER — OFFICE VISIT (OUTPATIENT)
Dept: FAMILY MEDICINE | Facility: OTHER | Age: 53
End: 2023-03-03
Payer: COMMERCIAL

## 2023-03-03 VITALS
RESPIRATION RATE: 20 BRPM | DIASTOLIC BLOOD PRESSURE: 72 MMHG | HEIGHT: 64 IN | SYSTOLIC BLOOD PRESSURE: 112 MMHG | WEIGHT: 212 LBS | BODY MASS INDEX: 36.19 KG/M2 | HEART RATE: 66 BPM | TEMPERATURE: 96.8 F | OXYGEN SATURATION: 99 %

## 2023-03-03 DIAGNOSIS — Z98.890 STATUS POST ABDOMINOPLASTY: ICD-10-CM

## 2023-03-03 DIAGNOSIS — Z48.02 ENCOUNTER FOR REMOVAL OF SUTURES: Primary | ICD-10-CM

## 2023-03-03 DIAGNOSIS — Z98.82 STATUS POST BREAST AUGMENTATION: ICD-10-CM

## 2023-03-03 PROCEDURE — 99213 OFFICE O/P EST LOW 20 MIN: CPT | Performed by: FAMILY MEDICINE

## 2023-03-03 RX ORDER — CLINDAMYCIN HCL 300 MG
150 CAPSULE ORAL 4 TIMES DAILY
COMMUNITY
End: 2023-04-13

## 2023-03-03 RX ORDER — MONTELUKAST SODIUM 10 MG/1
10 TABLET ORAL AT BEDTIME
COMMUNITY
End: 2023-06-26

## 2023-03-03 RX ORDER — CIPROFLOXACIN 500 MG/1
500 TABLET, FILM COATED ORAL 2 TIMES DAILY
COMMUNITY
End: 2023-04-13

## 2023-03-03 ASSESSMENT — PAIN SCALES - GENERAL: PAINLEVEL: NO PAIN (0)

## 2023-03-03 NOTE — PROGRESS NOTES
RN was asked to assist with suture removal. Assisted with RN's Melony.     BACKGROUND:  The patient has had the sutures/staples placed on 2/16/23 in [place] for a cosmetic procedure.   There has been no history of infection, patient has drain in place on left thigh. Patient was told to leave drain in until daily output was down to 25-50ml or less per day. Patient is currently on oral prophylactic antibiotics.      ASSESSMENT:   Incision is clean without signs of infection and edges are well approximated.    Multipe sutures are seen located under the breasts, lower abdomen (hip to hip) naval, upper pelvic region, and back. Patient reports that she is unsure the exact number of sutures in each region as she was not given that documentation after her procedure.       RECOMMENDATION (ACTION):    Area is prepped with an alcohol swab and multiple sutures are removed from the above regions. Two approx. 3-5cm, of what appears to be subcuticular sutures, remain in the lower abdomen in the hip to hip incisional line on the left side, as the suture string snapped from a decent amount of pulling. Patient reports she can not feel pain in this area but can feel some sensation with pulling of the sutures during removal.   Routine wound care discussed.    The patient will follow up with RN on Tuesday to reassess drain.    AMEENA Tang, RN  [facility] ~ Registered Nurse  Clinic Triage ~ [place] [facility] [place]  March 3, 2023

## 2023-03-03 NOTE — PROGRESS NOTES
Assessment & Plan       ICD-10-CM    1. Encounter for removal of sutures  Z48.02       2. Status post abdominoplasty  Z98.890       3. Status post breast augmentation  Z98.82            I removed the sutures underneath both breasts.  These were apparently placed subcuticularly, but that was not immediately apparent so required some difficulty in removing.  I also removed the sutures from her back and buttocks.  At this time, I asked my nurse to help remove the sutures from her abdomen.  These also appear to be occasionally placed subcuticularly.  But some appear to also be encouraged in the deep tissue.  Because of this, I was worried that we are starting to remove absorbable sutures.  At this point, I elected to abandon further suture removal.  She is to return if any worsening become apparent.  Wounds all appear to be healing well.  There is scant drainage from the abdominal wound.  Drain was left in place until her drainage gets below per day.  Okay to Remove at that time.    Portions of this note were completed using Dragon dictation software.  Although reviewed, there may be typographical and other inadvertent errors that remain.           20 minutes spent on the date of the encounter doing chart review, history and exam, documentation and further activities per the note           No follow-ups on file.    Lalo De Leon MD, MD  [facility]    Filemon Young is a 53 year old accompanied by her spouse, presenting for the following health issues:  Surgical Followup      History of Present Illness       Reason for visit:  Stitchs removed    She eats 2-3 servings of fruits and vegetables daily.She consumes 0 sweetened beverage(s) daily.She exercises with enough effort to increase her heart rate 10 to 19 minutes per day.  She exercises with enough effort to increase her heart rate 3 or less days per week.   She is taking medications regularly.     Pt had some cosmetic surgery done  "in [place] 2/16/23.  She's here for stitch removal.      Her breast sutures were supposed to be removed at 7-10 days.  Tummy tuck sutures were to be removed at 12-20 days.      She was told to keep drain in until it's down to 25-50 ml or less/day.      She recuperated in [place], [place] after her surgery.            Review of Systems         Objective    /72   Pulse 66   Temp 96.8  F (36  C) (Temporal)   Resp 20   Ht 1.626 m (5' 4\")   Wt 96.2 kg (212 lb)   LMP 01/17/2023 (Exact Date)   SpO2 99%   BMI 36.39 kg/m    Body mass index is 36.39 kg/m .  Physical Exam   GENERAL: healthy, alert and no distress  SKIN: Extensive sutures under breasts around abdomen and several on her back as well.  Accordion style BETTY type drain is present.    Office Visit on 02/13/2023   Component Date Value Ref Range Status     Vitamin D, Total (25-Hydroxy) 02/13/2023 44  20 - 75 ug/L Final     Ferritin 02/13/2023 46  11 - 328 ng/mL Final     Vitamin B12 02/13/2023 412  232 - 1,245 pg/mL Final     Sodium 02/13/2023 137  136 - 145 mmol/L Final     Potassium 02/13/2023 4.6  3.4 - 5.3 mmol/L Final     Chloride 02/13/2023 100  98 - 107 mmol/L Final     Carbon Dioxide (CO2) 02/13/2023 26  22 - 29 mmol/L Final     Anion Gap 02/13/2023 11  7 - 15 mmol/L Final     Urea Nitrogen 02/13/2023 15.8  6.0 - 20.0 mg/dL Final     Creatinine 02/13/2023 1.14 (H)  0.51 - 0.95 mg/dL Final     Calcium 02/13/2023 10.0  8.6 - 10.0 mg/dL Final     Glucose 02/13/2023 94  70 - 99 mg/dL Final     Alkaline Phosphatase 02/13/2023 76  35 - 104 U/L Final     AST 02/13/2023 19  10 - 35 U/L Final     ALT 02/13/2023 16  10 - 35 U/L Final     Protein Total 02/13/2023 7.3  6.4 - 8.3 g/dL Final     Albumin 02/13/2023 4.3  3.5 - 5.2 g/dL Final     Bilirubin Total 02/13/2023 0.6  <=1.2 mg/dL Final     GFR Estimate 02/13/2023 58 (L)  >60 mL/min/1.73m2 Final    eGFR calculated using 2021 CKD-EPI equation.     TSH 02/13/2023 3.40  0.30 - 4.20 uIU/mL Final     HIV Antigen " Antibody Combo 02/13/2023 Nonreactive  Nonreactive Final    HIV-1 p24 Ag & HIV-1/HIV-2 Ab Not Detected     WBC Count 02/13/2023 5.3  4.0 - 11.0 10e3/uL Final     RBC Count 02/13/2023 4.50  3.80 - 5.20 10e6/uL Final     Hemoglobin 02/13/2023 13.5  11.7 - 15.7 g/dL Final     Hematocrit 02/13/2023 40.7  35.0 - 47.0 % Final     MCV 02/13/2023 90  78 - 100 fL Final     MCH 02/13/2023 30.0  26.5 - 33.0 pg Final     MCHC 02/13/2023 33.2  31.5 - 36.5 g/dL Final     RDW 02/13/2023 13.2  10.0 - 15.0 % Final     Platelet Count 02/13/2023 318  150 - 450 10e3/uL Final     % Neutrophils 02/13/2023 67  % Final     % Lymphocytes 02/13/2023 24  % Final     % Monocytes 02/13/2023 7  % Final     % Eosinophils 02/13/2023 2  % Final     % Basophils 02/13/2023 0  % Final     Absolute Neutrophils 02/13/2023 3.6  1.6 - 8.3 10e3/uL Final     Absolute Lymphocytes 02/13/2023 1.3  0.8 - 5.3 10e3/uL Final     Absolute Monocytes 02/13/2023 0.4  0.0 - 1.3 10e3/uL Final     Absolute Eosinophils 02/13/2023 0.1  0.0 - 0.7 10e3/uL Final     Absolute Basophils 02/13/2023 0.0  0.0 - 0.2 10e3/uL Final

## 2023-03-07 ENCOUNTER — ALLIED HEALTH/NURSE VISIT (OUTPATIENT)
Dept: FAMILY MEDICINE | Facility: OTHER | Age: 53
End: 2023-03-07
Payer: COMMERCIAL

## 2023-03-07 DIAGNOSIS — Z78.9 TRIAGE ASSESSMENT CLASS 3, NONURGENT: Primary | ICD-10-CM

## 2023-03-07 PROCEDURE — 99207 PR NO CHARGE NURSE ONLY: CPT

## 2023-03-07 NOTE — PROGRESS NOTES
Patient in clinic today to have abdominal incision assessed and left thigh drain output evaluated.     Sutures that were left in place during office visit on 3/3/23 have not surfaced to be removed.   Abdominal incision healing appropriately. No open areas or signs of infection. Dry scabbing present.     Left thigh drain continues to have roughly 150 ml output in a 24 hour period per patient report. Was not emptied in clinic as this was last emptied only 18 hours ago.     Will update provider for next steps.     Ирина VITALN, RN  [facility]

## 2023-03-10 ENCOUNTER — DOCUMENTATION ONLY (OUTPATIENT)
Dept: NEUROSURGERY | Facility: CLINIC | Age: 53
End: 2023-03-10
Payer: COMMERCIAL

## 2023-03-13 NOTE — PROGRESS NOTES
Faxed Form March 13, 2023 to fax number [phone].    Right Fax confirmed at 9:17AM.    eJs Panchal

## 2023-03-16 ENCOUNTER — MYC MEDICAL ADVICE (OUTPATIENT)
Dept: FAMILY MEDICINE | Facility: OTHER | Age: 53
End: 2023-03-16
Payer: COMMERCIAL

## 2023-03-20 NOTE — TELEPHONE ENCOUNTER
Routing to provider - patient concerned for pocket of fluid in right hip (opposite side the drain is located). What would be the best course of action for this?  Okay for patient to come in tomorrow on RN schedule had have drain removed? Or should this be evaluated by a provider first since still have 100 ml output?    Ирина VITALN, RN  [facility]

## 2023-03-20 NOTE — TELEPHONE ENCOUNTER
OK to remove drain.  Would need to be seen and possible get us to evaluate suspected seroma.    Thanks,    Dr. De Leon

## 2023-03-21 NOTE — TELEPHONE ENCOUNTER
Spoke with patient. She will be coming on Wednesday, March 22nd sometime in the morning.     Ирина VITALN, RN  [facility]

## 2023-03-22 ENCOUNTER — ALLIED HEALTH/NURSE VISIT (OUTPATIENT)
Dept: FAMILY MEDICINE | Facility: OTHER | Age: 53
End: 2023-03-22
Payer: COMMERCIAL

## 2023-03-22 DIAGNOSIS — Z48.00 CHANGE OF DRESSING: Primary | ICD-10-CM

## 2023-03-22 PROCEDURE — 99207 PR NO CHARGE NURSE ONLY: CPT

## 2023-03-22 NOTE — PROGRESS NOTES
Patient here for removal of hemovac drain per Dr. De Leon.      Drain located in the left upper thigh.   No signs or symptoms of infection noted at insertion site.   Drain had been sutured into skin post-op. Upon assessment of site today, drain no longer sutured into the skin. Patient reports it had gotten pulled out slightly at home.    RN was able to remove drain without difficulty and patient tolerated well.  Bacitracin applied to site and covered with bandage.   Discussed signs and symptoms of infection and when to contact clinic for concerns. Patient verbalized understanding and had no further questions at this time.     Ирина VITALN, RN  [facility]

## 2023-03-26 DIAGNOSIS — I10 BENIGN ESSENTIAL HYPERTENSION: ICD-10-CM

## 2023-03-26 DIAGNOSIS — F33.1 MAJOR DEPRESSIVE DISORDER, RECURRENT EPISODE, MODERATE (H): ICD-10-CM

## 2023-03-27 RX ORDER — METOPROLOL SUCCINATE 50 MG/1
TABLET, EXTENDED RELEASE ORAL
Qty: 90 TABLET | Refills: 0 | Status: SHIPPED | OUTPATIENT
Start: 2023-03-27 | End: 2023-04-28

## 2023-03-27 RX ORDER — BUPROPION HYDROCHLORIDE 300 MG/1
TABLET ORAL
Qty: 90 TABLET | Refills: 0 | Status: SHIPPED | OUTPATIENT
Start: 2023-03-27 | End: 2023-04-28

## 2023-04-10 ENCOUNTER — MYC MEDICAL ADVICE (OUTPATIENT)
Dept: FAMILY MEDICINE | Facility: OTHER | Age: 53
End: 2023-04-10
Payer: COMMERCIAL

## 2023-04-11 LAB — NONINV COLON CA DNA+OCC BLD SCRN STL QL: NEGATIVE

## 2023-04-13 ENCOUNTER — OFFICE VISIT (OUTPATIENT)
Dept: FAMILY MEDICINE | Facility: OTHER | Age: 53
End: 2023-04-13
Payer: COMMERCIAL

## 2023-04-13 VITALS
HEART RATE: 54 BPM | SYSTOLIC BLOOD PRESSURE: 102 MMHG | WEIGHT: 209 LBS | BODY MASS INDEX: 35.68 KG/M2 | TEMPERATURE: 97.2 F | OXYGEN SATURATION: 97 % | DIASTOLIC BLOOD PRESSURE: 70 MMHG | HEIGHT: 64 IN

## 2023-04-13 DIAGNOSIS — L76.33 POSTOPERATIVE SEROMA OF SKIN AFTER DERMATOLOGIC PROCEDURE: Primary | ICD-10-CM

## 2023-04-13 PROCEDURE — 99213 OFFICE O/P EST LOW 20 MIN: CPT | Performed by: FAMILY MEDICINE

## 2023-04-13 ASSESSMENT — PAIN SCALES - GENERAL: PAINLEVEL: NO PAIN (0)

## 2023-04-13 NOTE — PATIENT INSTRUCTIONS
Thank you for visiting [facility]    Let's see what your ultrasound shows.      Depending on results, aspiration or surgery may be options to manage this.      Contact us or return if questions or concerns.     Have a nice day!    Dr. De Leon     Return if symptoms worsen or fail to improve.      If you need medication refills, please contact your pharmacy 3 days before your prescriptions runs out or download the [facility] donnie for your smart phone. If you are out of refills, your pharmacy will contact contact the clinic.                                     MyChart Assistance [phone]

## 2023-04-13 NOTE — PROGRESS NOTES
Assessment & Plan       ICD-10-CM    1. Postoperative seroma of skin after dermatologic procedure  L76.33 US Abdomen Limited           I could not definitively feel a seroma on the right hip.  Because of this, we will obtain an ultrasound to further evaluate.  I hope it is not the case, but is possible that her plastic surgeon simply did not do a very good job getting her hip symmetrical.  She does have a small seroma near her abdominoplasty incision.  Since this is not currently infected or painful, I recommended simple monitoring.  Patient was okay with this.  Discussed that if seromas are present, we could consider aspiration, continued Monitoring, or referral to surgery for repeat interventions.  She will consider her options.    Portions of this note were completed using Dragon dictation software.  Although reviewed, there may be typographical and other inadvertent errors that remain.     Patient declined all preventative interventions at this time.      Ordering of each unique test         Patient Instructions   Thank you for visiting [facility]    Let's see what your ultrasound shows.      Depending on results, aspiration or surgery may be options to manage this.      Contact us or return if questions or concerns.     Have a nice day!    Dr. De Leon     Return if symptoms worsen or fail to improve.      If you need medication refills, please contact your pharmacy 3 days before your prescriptions runs out or download the [facility] donnie for your smart phone. If you are out of refills, your pharmacy will contact contact the clinic.                                     MyChart Assistance [phone]                    Lalo De Leon MD, MD  [facility] KIKE Young is a 53 year old, presenting for the following health issues:  Hip Problem (Possible seroma)         View : No data to display.              History of Present Illness       Reason for  "visit:  Possible seroma on rt hip    She eats 2-3 servings of fruits and vegetables daily.She consumes 0 sweetened beverage(s) daily.She exercises with enough effort to increase her heart rate 10 to 19 minutes per day.  She exercises with enough effort to increase her heart rate 3 or less days per week.   She is taking medications regularly.     Pt notes that swelling after her plastic surgery isn't really going down.  Pt had a \"mommy makeover\" in [place].  It's not getting worse, but can't sleep on that side because it puts additional pressure on that area.  She does note some fluid seems to move if she puts pressure on it.  This only arose after the drain was removed.  No further drainage from her wounds.  Otherwise, healing well.      Denies pain except when pressure is present.      If she doesn't wear her compression garment, will get more swelling.  Hoping to wear this for another month.      No fever/chills.          Review of Systems   Constitutional, HEENT, cardiovascular, pulmonary, gi and gu systems are negative, except as otherwise noted.      Objective    /70 (BP Location: Left arm, Patient Position: Sitting, Cuff Size: Adult Regular)   Pulse 54   Temp 97.2  F (36.2  C) (Temporal)   Ht 1.626 m (5' 4\")   Wt 94.8 kg (209 lb)   LMP 01/17/2023 (Exact Date)   SpO2 97%   BMI 35.87 kg/m    Body mass index is 35.87 kg/m .  Physical Exam   GENERAL: healthy, alert and no distress  SKIN: asymmetry of hips, larger on right. No definite fluctuance.  There is a bit of fluctuance near her anterior abdominal incision on the right and midline area.    Lab on 02/13/2023   Component Date Value Ref Range Status     COLOGUARD-ABSTRACT 04/03/2023 Negative  Negative Final    Comment:   NEGATIVE TEST RESULT. A negative Cologuard result indicates a low likelihood that a colorectal cancer (CRC) or advanced adenoma (adenomatous polyps with more advanced pre-malignant features)  is present. The chance that a person with a " negative Cologuard test has a colorectal cancer is less than 1 in 1500 (negative predictive value >99.9%) or has an  advanced adenoma is less than  5.3% (negative predictive value 94.7%). These data are based on a prospective cross-sectional study of 10,000 individuals at average risk for colorectal cancer who were screened with both Cologuard and colonoscopy. (Calista WAN et al, N Engl J Med 2014;370(14):8412-4577) The normal value (reference range) for this assay is negative.    COLOGUARD RE-SCREENING RECOMMENDATION: Periodic colorectal cancer screening is an important part of preventive healthcare for asymptomatic individuals at average risk for colorectal cancer.  Following a negative Cologuard result, the American Cancer Society and U.S.                            Multi-Society Task Force screening guidelines recommend a Cologuard re-screening interval of 3 years.   References: American Cancer Society Guideline for Colorectal Cancer Screening: https://www.cancer.org/cancer/colon-rectal-cancer/napbfbynh-xnaimpglj-wblcafq/acs-recommendations.html.; Frandy DK, Bebeto MAYERS, Vidal CAGEK, Colorectal Cancer Screening: Recommendations for Physicians and Patients from the U.S. Multi-Society Task Force on Colorectal Cancer Screening , Am J Gastroenterology 2017; 112:6066-1204.    TEST DESCRIPTION: Composite algorithmic analysis of stool DNA-biomarkers with hemoglobin immunoassay.   Quantitative values of individual biomarkers are not reportable and are not associated with individual biomarker result reference ranges. Cologuard is intended for colorectal cancer screening of adults of either sex, 45 years or older, who are at average-risk for colorectal cancer (CRC). Cologuard has been approved for use by the U.S. FDA. The performance of Cologuard was                            established in a cross sectional study of average-risk adults aged 50-84. Cologuard performance in patients ages 45 to 49 years was estimated by  sub-group analysis of near-age groups. Colonoscopies performed for a positive result may find as the most clinically significant lesion: colorectal cancer [4.0%], advanced adenoma (including sessile serrated polyps greater than or equal to 1cm diameter) [20%] or non- advanced adenoma [31%]; or no colorectal neoplasia [45%]. These estimates are derived from a prospective cross-sectional screening study of 10,000 individuals at average risk for colorectal cancer who were screened with both Cologuard and colonoscopy. (Calista Mckeon al, N Engl J Med 2014;370(14):2609-0773.) Cologuard may produce a false negative or false positive result (no colorectal cancer or precancerous polyp present at colonoscopy follow up). A negative Cologuard test result does not guarantee the absence of CRC or advanced adenoma (pre-cancer). The current Cologuard                            screening interval is every 3 years. (American Cancer Society and U.S. Multi-Society Task Force). Cologuard performance data in a 10,000 patient pivotal study using colonoscopy as the reference method can be accessed at the following location: www.[employer].MtoV/results. Additional description of the Cologuard test process, warnings and precautions can be found at www.[employer].com.         No results found for any visits on 04/13/23.  No results found for this or any previous visit (from the past 24 hour(s)).

## 2023-04-28 ENCOUNTER — OFFICE VISIT (OUTPATIENT)
Dept: FAMILY MEDICINE | Facility: OTHER | Age: 53
End: 2023-04-28
Payer: COMMERCIAL

## 2023-04-28 ENCOUNTER — ANCILLARY PROCEDURE (OUTPATIENT)
Dept: ULTRASOUND IMAGING | Facility: OTHER | Age: 53
End: 2023-04-28
Attending: FAMILY MEDICINE
Payer: COMMERCIAL

## 2023-04-28 VITALS
OXYGEN SATURATION: 98 % | RESPIRATION RATE: 18 BRPM | SYSTOLIC BLOOD PRESSURE: 98 MMHG | BODY MASS INDEX: 35.53 KG/M2 | TEMPERATURE: 97.8 F | HEART RATE: 55 BPM | DIASTOLIC BLOOD PRESSURE: 68 MMHG | WEIGHT: 207 LBS

## 2023-04-28 DIAGNOSIS — F33.1 MAJOR DEPRESSIVE DISORDER, RECURRENT EPISODE, MODERATE (H): ICD-10-CM

## 2023-04-28 DIAGNOSIS — Z30.431 INTRAUTERINE DEVICE SURVEILLANCE: ICD-10-CM

## 2023-04-28 DIAGNOSIS — L76.33 POSTOPERATIVE SEROMA OF SKIN AFTER DERMATOLOGIC PROCEDURE: ICD-10-CM

## 2023-04-28 DIAGNOSIS — I10 BENIGN ESSENTIAL HYPERTENSION: Primary | ICD-10-CM

## 2023-04-28 LAB
ANION GAP SERPL CALCULATED.3IONS-SCNC: 9 MMOL/L (ref 7–15)
BUN SERPL-MCNC: 14.4 MG/DL (ref 6–20)
CALCIUM SERPL-MCNC: 9.4 MG/DL (ref 8.6–10)
CHLORIDE SERPL-SCNC: 102 MMOL/L (ref 98–107)
CHOLEST SERPL-MCNC: 230 MG/DL
CREAT SERPL-MCNC: 1.06 MG/DL (ref 0.51–0.95)
DEPRECATED HCO3 PLAS-SCNC: 27 MMOL/L (ref 22–29)
GFR SERPL CREATININE-BSD FRML MDRD: 63 ML/MIN/1.73M2
GLUCOSE SERPL-MCNC: 89 MG/DL (ref 70–99)
HDLC SERPL-MCNC: 42 MG/DL
LDLC SERPL CALC-MCNC: 162 MG/DL
NONHDLC SERPL-MCNC: 188 MG/DL
POTASSIUM SERPL-SCNC: 4.2 MMOL/L (ref 3.4–5.3)
SODIUM SERPL-SCNC: 138 MMOL/L (ref 136–145)
TRIGL SERPL-MCNC: 128 MG/DL

## 2023-04-28 PROCEDURE — 76705 ECHO EXAM OF ABDOMEN: CPT | Mod: TC | Performed by: RADIOLOGY

## 2023-04-28 PROCEDURE — 36415 COLL VENOUS BLD VENIPUNCTURE: CPT | Performed by: FAMILY MEDICINE

## 2023-04-28 PROCEDURE — 80061 LIPID PANEL: CPT | Performed by: FAMILY MEDICINE

## 2023-04-28 PROCEDURE — 99214 OFFICE O/P EST MOD 30 MIN: CPT | Performed by: FAMILY MEDICINE

## 2023-04-28 PROCEDURE — 80048 BASIC METABOLIC PNL TOTAL CA: CPT | Performed by: FAMILY MEDICINE

## 2023-04-28 RX ORDER — METOPROLOL SUCCINATE 25 MG/1
25 TABLET, EXTENDED RELEASE ORAL DAILY
Qty: 90 TABLET | Refills: 3 | Status: SHIPPED | OUTPATIENT
Start: 2023-04-28 | End: 2024-03-08

## 2023-04-28 RX ORDER — BUPROPION HYDROCHLORIDE 300 MG/1
300 TABLET ORAL EVERY MORNING
Qty: 90 TABLET | Refills: 3 | Status: SHIPPED | OUTPATIENT
Start: 2023-04-28 | End: 2023-10-27

## 2023-04-28 RX ORDER — ESCITALOPRAM OXALATE 10 MG/1
15 TABLET ORAL DAILY
Qty: 135 TABLET | Refills: 3 | Status: SHIPPED | OUTPATIENT
Start: 2023-04-28 | End: 2024-03-08

## 2023-04-28 ASSESSMENT — ANXIETY QUESTIONNAIRES
5. BEING SO RESTLESS THAT IT IS HARD TO SIT STILL: NOT AT ALL
4. TROUBLE RELAXING: NOT AT ALL
2. NOT BEING ABLE TO STOP OR CONTROL WORRYING: NOT AT ALL
GAD7 TOTAL SCORE: 0
GAD7 TOTAL SCORE: 0
IF YOU CHECKED OFF ANY PROBLEMS ON THIS QUESTIONNAIRE, HOW DIFFICULT HAVE THESE PROBLEMS MADE IT FOR YOU TO DO YOUR WORK, TAKE CARE OF THINGS AT HOME, OR GET ALONG WITH OTHER PEOPLE: NOT DIFFICULT AT ALL
8. IF YOU CHECKED OFF ANY PROBLEMS, HOW DIFFICULT HAVE THESE MADE IT FOR YOU TO DO YOUR WORK, TAKE CARE OF THINGS AT HOME, OR GET ALONG WITH OTHER PEOPLE?: NOT DIFFICULT AT ALL
6. BECOMING EASILY ANNOYED OR IRRITABLE: NOT AT ALL
7. FEELING AFRAID AS IF SOMETHING AWFUL MIGHT HAPPEN: NOT AT ALL
3. WORRYING TOO MUCH ABOUT DIFFERENT THINGS: NOT AT ALL
7. FEELING AFRAID AS IF SOMETHING AWFUL MIGHT HAPPEN: NOT AT ALL
1. FEELING NERVOUS, ANXIOUS, OR ON EDGE: NOT AT ALL
GAD7 TOTAL SCORE: 0

## 2023-04-28 ASSESSMENT — PAIN SCALES - GENERAL: PAINLEVEL: NO PAIN (0)

## 2023-04-28 NOTE — RESULT ENCOUNTER NOTE
Hannah,    Ultrasound confirms small seroma is present.  This is not very deep and may resolve  on it's own.  If not, we could consider ultrasound-guided drainage or repeat surgery.  Let us know if you want to proceed with something else.    Have a nice day!    Dr. De Leon

## 2023-04-28 NOTE — PROGRESS NOTES
Assessment & Plan     Benign essential hypertension  Blood pressure consistently in the 90s over 60s and she does have intermittent lightheadedness.  We will decrease her metoprolol from 50 mg to 25 mg daily.  She will continue to monitor blood pressures and if they remain low may consider stopping the medication completely.    - metoprolol succinate ER (TOPROL XL) 25 MG 24 hr tablet; Take 1 tablet (25 mg) by mouth daily  - Basic metabolic panel  (Ca, Cl, CO2, Creat, Gluc, K, Na, BUN)  - Lipid panel reflex to direct LDL Non-fasting    Major depressive disorder, recurrent episode, moderate (H)  Mood is doing very well.  She is very pleased with her plastic surgery.  She feels her mother is doing well and her new environment and this is taken some stress off of the patient.  We discussed considering decreasing her Lexapro but at this point she feels things are going well and stable and she would like to stay on this dose for now.    - escitalopram (LEXAPRO) 10 MG tablet; Take 1.5 tablets (15 mg) by mouth daily  - buPROPion (WELLBUTRIN XL) 300 MG 24 hr tablet; Take 1 tablet (300 mg) by mouth every morning    Intrauterine device surveillance  We discussed that she could keep the IUD in for up to 8 years.  Because it appears she is perimenopausal may be heading towards menopause would recommend that she keep the IUD in place for at least another year before removing.  Patient is agreeable with this.    Merlene Paz MD  [facility] 53 year [facility], presenting for the following health issues:  Recheck Medication and iud removal        4/28/2023     7:16 AM   Additional Questions   Roomed by cynthia   Accompanied by alone         4/28/2023     7:16 AM   Patient Reported Additional Medications   Patient reports taking the following new medications none     HPI     IUD has been in place for 5 years.  She reports she was having light periods with this and now last 1 was  about 2 months ago.  She had been having a lot of hot flashes and those are starting to improve.  She is concerned about worsening her perimenopausal symptoms with removal of the IUD.      Feeling lightheaded a few mornings, BPs at home in the 90s/60s.  Recently had a mommy makeover surgery which removed about 12 pounds of skin/fat.  Since then her blood pressure has been running on the lower side.    Depression Followup    How are you doing with your depression since your last visit? No change    Are you having other symptoms that might be associated with depression? No    Have you had a significant life event?  No     Are you feeling anxious or having panic attacks?   No    Do you have any concerns with your use of alcohol or other drugs? No    Social History     Tobacco Use     Smoking status: Former     Packs/day: 0.10     Years: 18.00     Pack years: 1.80     Types: Cigarettes     Quit date: 2014     Years since quittin.4     Smokeless tobacco: Never     Tobacco comments:     since    Vaping Use     Vaping status: Never Used   Substance Use Topics     Alcohol use: Yes     Comment: Occasional     Drug use: No         2022     9:46 AM 2023     7:52 AM 2023     7:15 AM   PHQ   PHQ-9 Total Score 0 2 0   Q9: Thoughts of better off dead/self-harm past 2 weeks Not at all Not at all Not at all         2021     1:44 PM 2022     9:51 AM 2023     7:15 AM   CHITRA-7 SCORE   Total Score 0 (minimal anxiety) 0 (minimal anxiety) 0 (minimal anxiety)   Total Score 0 0 0           Objective    BP 98/68   Pulse 55   Temp 97.8  F (36.6  C) (Temporal)   Resp 18   Wt 93.9 kg (207 lb)   LMP  (LMP Unknown)   SpO2 98%   BMI 35.53 kg/m    Body mass index is 35.53 kg/m .  Physical Exam   Gen: no apparent distress  Chest/CV: S1 and S2 normal, no murmurs, clicks, gallops or rubs. Regular rate and rhythm. Chest is clear; no wheezes or rales. No edema.  Skin: Surgical sites are healing well without  erythema, induration or discharge.  Psych: Alert and oriented times 3; coherent speech, normal   rate and volume, able to articulate logical thoughts, able   to abstract reason, no tangential thoughts, no hallucinations   or delusions  Her affect is bright

## 2023-06-10 ENCOUNTER — HEALTH MAINTENANCE LETTER (OUTPATIENT)
Age: 53
End: 2023-06-10

## 2023-06-20 ENCOUNTER — OFFICE VISIT (OUTPATIENT)
Dept: FAMILY MEDICINE | Facility: CLINIC | Age: 53
End: 2023-06-20
Payer: COMMERCIAL

## 2023-06-20 VITALS
HEIGHT: 63 IN | SYSTOLIC BLOOD PRESSURE: 118 MMHG | TEMPERATURE: 96.1 F | OXYGEN SATURATION: 99 % | DIASTOLIC BLOOD PRESSURE: 70 MMHG | BODY MASS INDEX: 36.79 KG/M2 | HEART RATE: 60 BPM | RESPIRATION RATE: 16 BRPM | WEIGHT: 207.6 LBS

## 2023-06-20 DIAGNOSIS — S39.011D STRAIN OF ABDOMINAL MUSCLE, SUBSEQUENT ENCOUNTER: Primary | ICD-10-CM

## 2023-06-20 PROCEDURE — 99213 OFFICE O/P EST LOW 20 MIN: CPT | Performed by: STUDENT IN AN ORGANIZED HEALTH CARE EDUCATION/TRAINING PROGRAM

## 2023-06-20 ASSESSMENT — PAIN SCALES - GENERAL: PAINLEVEL: NO PAIN (0)

## 2023-06-20 NOTE — PROGRESS NOTES
Assessment & Plan     Strain of abdominal muscle, subsequent encounter  Symptoms consistent with right abdominal strain and no hernia noted on exam today.  Previous incisions are lower and well-healing.  Do not have op notes from outside surgery.  Discussed topicals and Tylenol as needed.  She will start stretching at home and if things not improving I would have her follow-up with physical therapy.        Naldo Robles MD  [facility] NOLAN Young is a 53 year old, presenting for the following health issues:  Abdominal Pain        6/20/2023    12:39 PM   Additional Questions   Roomed by Maribel ROBERTO   Accompanied by Jad,          6/20/2023    12:39 PM   Patient Reported Additional Medications   Patient reports taking the following new medications Allegra     History of Present Illness       Reason for visit:  Sore/pain in abdomen  Symptom onset:  1-2 weeks ago  Symptoms include:  Hurts when I bend over at the waist  Symptom intensity:  Moderate  Symptom progression:  Staying the same  Had these symptoms before:  No  What makes it worse:  No  What makes it better:  Don t bend over    She eats 2-3 servings of fruits and vegetables daily.She consumes 0 sweetened beverage(s) daily.She exercises with enough effort to increase her heart rate 20 to 29 minutes per day.  She exercises with enough effort to increase her heart rate 4 days per week.   She is taking medications regularly.     Patient notes several weeks of right abdominal pain that has been worse with being outside gardening more these last few weeks.  No pain at rest and only worse with movement and bending.  No herniation or bulging.  No other GI or urinary symptoms.  Generally feeling well.  In February she did have breast augmentation as well as tummy tuck procedure done in [place].  Notes healing well and having no symptoms since then until recently.  No other fevers or recent illness.      Review of Systems  "  Constitutional, HEENT, cardiovascular, pulmonary, gi and gu systems are negative, except as otherwise noted.      Objective    /70 (BP Location: Left arm, Patient Position: Chair)   Pulse 60   Temp (!) 96.1  F (35.6  C) (Temporal)   Resp 16   Ht 1.6 m (5' 3\")   Wt 94.2 kg (207 lb 9.6 oz)   LMP  (LMP Unknown)   SpO2 99%   BMI 36.77 kg/m    Body mass index is 36.77 kg/m .  Physical Exam   GENERAL: healthy, alert and no distress  EYES: Eyes grossly normal to inspection, PERRL and conjunctivae and sclerae normal  HENT: nose and mouth without ulcers or lesions  NECK: no adenopathy, no asymmetry, masses, or scars and thyroid normal to palpation  RESP: lungs clear to auscultation - no rales, rhonchi or wheezes  CV: regular rate and rhythm, normal S1 S2, no S3 or S4, no murmur, click or rub, no peripheral edema and peripheral pulses strong  ABDOMEN: soft, mild right superior point tenderness without herniations with Valsalva noted, no hepatosplenomegaly, no masses and bowel sounds normal  MS: no gross musculoskeletal defects noted, no edema  SKIN: no suspicious lesions or rashes  NEURO:  mentation intact and speech normal  PSYCH: mentation appears normal, affect normal/bright              "

## 2023-06-26 ENCOUNTER — E-VISIT (OUTPATIENT)
Dept: FAMILY MEDICINE | Facility: OTHER | Age: 53
End: 2023-06-26
Payer: COMMERCIAL

## 2023-06-26 DIAGNOSIS — B37.31 CANDIDAL VULVOVAGINITIS: Primary | ICD-10-CM

## 2023-06-26 PROCEDURE — 99207 PR NON-BILLABLE SERV PER CHARTING: CPT | Performed by: PHYSICIAN ASSISTANT

## 2023-06-26 RX ORDER — FLUCONAZOLE 150 MG/1
150 TABLET ORAL ONCE
Qty: 1 TABLET | Refills: 0 | Status: SHIPPED | OUTPATIENT
Start: 2023-06-26 | End: 2023-06-26

## 2023-06-26 NOTE — PATIENT INSTRUCTIONS
Yeast Infection (Candida Vaginal Infection)    You have a Candida vaginal infection. This is also known as a yeast infection. It's most often caused by a type of yeast (fungus) called Candida. Candida are normally found in the vagina. But if they increase in number, this can lead to infection and cause symptoms.   Symptoms of a yeast infection can include:     Clumpy or thin, white discharge, which may look like cottage cheese    Itching or burning    Burning with urination  Certain factors can make a yeast infection more likely. These can include:     Taking certain medicines, such as antibiotics or birth control pills    Pregnancy    Diabetes    Weak immune system  A yeast infection is most often treated with antifungal medicine. This may be given as a vaginal cream or pills you take by mouth. Treatment may last for about 1 to 7 days. Women with severe or recurrent infections may need longer courses of treatment.   Home care    If you re prescribed medicine, be sure to use it as directed. Finish all of the medicine, even if your symptoms go away. Don t try to treat yourself using over-the-counter products without talking with your provider first. They will let you know if this is a good option for you.    Ask your provider what steps you can take to help reduce your risk of having a yeast infection in the future.    Follow-up care  Follow up with your healthcare provider, or as directed.   When to seek medical advice  Call your healthcare provider right away if:     You have a fever of 100.4 F (38 C) or higher, or as directed by your provider.    Your symptoms worsen, or they don t go away within a few days of starting treatment.    You have new pain in the lower belly or pelvic region.    You have side effects that bother you or a reaction to the cream or pills you re prescribed.    You or any partners you have sex with have new symptoms, such as a rash, joint pain, or sores.  Gill last reviewed this  educational content on 7/1/2020 2000-2022 [employer]. All rights reserved. This information is not intended as a substitute for professional medical care. Always follow your healthcare professional's instructions.

## 2023-08-22 DIAGNOSIS — I10 BENIGN ESSENTIAL HYPERTENSION: ICD-10-CM

## 2023-08-22 RX ORDER — METOPROLOL SUCCINATE 50 MG/1
TABLET, EXTENDED RELEASE ORAL
Qty: 90 TABLET | Refills: 0 | OUTPATIENT
Start: 2023-08-22

## 2023-08-24 ENCOUNTER — DOCUMENTATION ONLY (OUTPATIENT)
Dept: NEUROSURGERY | Facility: CLINIC | Age: 53
End: 2023-08-24
Payer: COMMERCIAL

## 2023-08-24 NOTE — PROGRESS NOTES
Health Care Provider Report completed. Dr. Burnette to determine MMI and PPD.     To be signed by Dr. Burnette

## 2023-09-29 ENCOUNTER — OFFICE VISIT (OUTPATIENT)
Dept: FAMILY MEDICINE | Facility: OTHER | Age: 53
End: 2023-09-29
Payer: COMMERCIAL

## 2023-09-29 VITALS
HEART RATE: 73 BPM | WEIGHT: 214.5 LBS | TEMPERATURE: 99.2 F | SYSTOLIC BLOOD PRESSURE: 138 MMHG | HEIGHT: 63 IN | BODY MASS INDEX: 38.01 KG/M2 | DIASTOLIC BLOOD PRESSURE: 80 MMHG | OXYGEN SATURATION: 97 %

## 2023-09-29 DIAGNOSIS — R53.81 MALAISE AND FATIGUE: ICD-10-CM

## 2023-09-29 DIAGNOSIS — R23.8 SCALP IRRITATION: ICD-10-CM

## 2023-09-29 DIAGNOSIS — J00 ACUTE RHINITIS: ICD-10-CM

## 2023-09-29 DIAGNOSIS — Z63.79 STRESSFUL LIFE EVENTS AFFECTING FAMILY AND HOUSEHOLD: Primary | ICD-10-CM

## 2023-09-29 DIAGNOSIS — R07.0 THROAT PAIN: ICD-10-CM

## 2023-09-29 DIAGNOSIS — R50.9 LOW GRADE FEVER: ICD-10-CM

## 2023-09-29 DIAGNOSIS — R53.83 MALAISE AND FATIGUE: ICD-10-CM

## 2023-09-29 DIAGNOSIS — R06.83 SNORING: ICD-10-CM

## 2023-09-29 DIAGNOSIS — J03.90 TONSILLITIS: ICD-10-CM

## 2023-09-29 DIAGNOSIS — E66.01 SEVERE OBESITY (BMI 35.0-39.9) WITH COMORBIDITY (H): ICD-10-CM

## 2023-09-29 LAB
DEPRECATED S PYO AG THROAT QL EIA: NEGATIVE
GROUP A STREP BY PCR: NOT DETECTED

## 2023-09-29 PROCEDURE — 87651 STREP A DNA AMP PROBE: CPT | Performed by: PHYSICIAN ASSISTANT

## 2023-09-29 PROCEDURE — 99214 OFFICE O/P EST MOD 30 MIN: CPT | Performed by: PHYSICIAN ASSISTANT

## 2023-09-29 RX ORDER — FLUCONAZOLE 150 MG/1
150 TABLET ORAL
Qty: 3 TABLET | Refills: 0 | Status: SHIPPED | OUTPATIENT
Start: 2023-09-29 | End: 2023-10-06

## 2023-09-29 RX ORDER — CEPHALEXIN 500 MG/1
500 CAPSULE ORAL 3 TIMES DAILY
Qty: 30 CAPSULE | Refills: 0 | Status: SHIPPED | OUTPATIENT
Start: 2023-09-29 | End: 2023-10-09

## 2023-09-29 ASSESSMENT — PAIN SCALES - GENERAL: PAINLEVEL: NO PAIN (0)

## 2023-09-29 NOTE — PROGRESS NOTES
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  Hopefully is able to be scheduled on 6 October.    Throat pain  Does not appear to be strep throat but tonsillitis is present.  Treat as noted hopefully this will work.  Treat with Diflucan as needed for vaginal yeast infections.  - Streptococcus A Rapid Screen w/Reflex to PCR - Clinic Collect  - cephALEXin (KEFLEX) 500 MG capsule; Take 1 capsule (500 mg) by mouth 3 times daily for 10 days  - fluconazole (DIFLUCAN) 150 MG tablet; Take 1 tablet (150 mg) by mouth every 3 days for 3 doses  - Group A Streptococcus PCR Throat Swab    Scalp irritation  Suspected mild folliculitis.  Treat as noted below and follow-up as needed.  - cephALEXin (KEFLEX) 500 MG capsule; Take 1 capsule (500 mg) by mouth 3 times daily for 10 days  - fluconazole (DIFLUCAN) 150 MG tablet; Take 1 tablet (150 mg) by mouth every 3 days for 3 doses  - pyrithione zinc (SELSUN BLUE/HEAD AND SHOULDERS) 1 % external shampoo; Apply 2 mLs topically daily as needed for irritation    Low grade fever  More than likely due to tonsillitis.  Treat and observe.  - cephALEXin (KEFLEX) 500 MG capsule; Take 1 capsule (500 mg) by mouth 3 times daily for 10 days  - fluconazole (DIFLUCAN) 150 MG tablet; Take 1 tablet (150 mg) by mouth every 3 days for 3 doses  - pyrithione zinc (SELSUN BLUE/HEAD AND SHOULDERS) 1 % external shampoo; Apply 2 mLs topically daily as needed for irritation    Acute rhinitis  Malaise and fatigue  Tonsillitis  As noted above  - Adult Sleep Eval & Management  Referral; Future  - cephALEXin (KEFLEX) 500 MG capsule; Take 1 capsule (500 mg) by mouth 3 times daily for 10 days  - fluconazole (DIFLUCAN) 150 MG tablet; Take 1 tablet (150 mg) by mouth every 3 days for 3 doses    Severe obesity (BMI 35.0-39.9) with comorbidity (H)  Advised that she follow-up  "with her primary care provider do recommend sleep study based on her snoring.  - Adult Sleep Eval & Management  Referral; Future    Snoring  See above  - Adult Sleep Eval & Management  Referral; Future     BMI:   Estimated body mass index is 38.01 kg/m  as calculated from the following:    Height as of this encounter: 1.6 m (5' 2.99\").    Weight as of this encounter: 97.3 kg (214 lb 8 oz).   Weight management plan: Patient was referred to their PCP to discuss a diet and exercise plan.    Abiel Reyna PA-C  [facility] KIKE Young is a 53 year old, presenting for the following health issues:  Hair/Scalp Problem and URI      9/29/2023     2:23 PM   Additional Questions   Roomed by Ana   Accompanied by Self         9/29/2023     2:23 PM   Patient Reported Additional Medications   Patient reports taking the following new medications NA       Hair/Scalp Problem  This is a new problem. The current episode started more than 1 month ago. The problem occurs constantly. The problem has been waxing and waning. Treatments tried: washing hair less often. The treatment provided no relief.   URI    History of Present Illness       Reason for visit:  Sore spots on my head  Symptom onset:  3-4 weeks ago  Symptom intensity:  Moderate  Symptom progression:  Staying the same  Had these symptoms before:  No  What makes it worse:  No  What makes it better:  No    She eats 2-3 servings of fruits and vegetables daily.She consumes 0 sweetened beverage(s) daily.She exercises with enough effort to increase her heart rate 20 to 29 minutes per day.  She exercises with enough effort to increase her heart rate 4 days per week.   She is taking medications regularly.         Concern - URI  Onset: 1 day   Description: Chest congestion/sore throat/cough/body aches/runny nose  Intensity: moderate  Progression of Symptoms:  worsening and constant  Accompanying Signs & Symptoms: see above   Previous " "history of similar problem: yes   Precipitating factors:        Worsened by: Unknown  Alleviating factors:        Improved by: unknown  Therapies tried and outcome: Zicam/theraflu        Review of Systems   Constitutional, HEENT, cardiovascular, pulmonary, GI, , musculoskeletal, neuro, skin, endocrine and psych systems are negative, except as otherwise noted.      Objective    /80   Pulse 73   Temp 99.2  F (37.3  C) (Temporal)   Ht 1.6 m (5' 2.99\")   Wt 97.3 kg (214 lb 8 oz)   SpO2 97%   BMI 38.01 kg/m    Body mass index is 38.01 kg/m .  Physical Exam   GENERAL: healthy, alert and no distress  HENT: normal cephalic/atraumatic, ear canals and TM's normal, nose and mouth without ulcers or lesions, rhinorrhea clear, oropharynx clear, oral mucous membranes moist, and tonsillar hypertrophy  NECK: no adenopathy, no asymmetry, masses, or scars and thyroid normal to palpation  RESP: lungs clear to auscultation - no rales, rhonchi or wheezes  CV: regular rate and rhythm, normal S1 S2, no S3 or S4, no murmur, click or rub, no peripheral edema and peripheral pulses strong  ABDOMEN: soft, nontender, no hepatosplenomegaly, no masses and bowel sounds normal  MS: no gross musculoskeletal defects noted, no edema  SKIN: no suspicious lesions or rashes with exception of what appears to be some very mild folliculitis to the scalp.  Mostly noted around the colic today.  NEURO: Normal strength and tone, mentation intact and speech normal  PSYCH: mentation appears normal, affect normal/bright    Results for orders placed or performed in visit on 09/29/23 (from the past 24 hour(s))   Streptococcus A Rapid Screen w/Reflex to PCR - Clinic Collect    Specimen: Throat; Swab   Result Value Ref Range    Group A Strep antigen Negative Negative                     "

## 2023-10-05 ENCOUNTER — OFFICE VISIT (OUTPATIENT)
Dept: FAMILY MEDICINE | Facility: OTHER | Age: 53
End: 2023-10-05
Payer: COMMERCIAL

## 2023-10-05 ENCOUNTER — MYC REFILL (OUTPATIENT)
Dept: FAMILY MEDICINE | Facility: OTHER | Age: 53
End: 2023-10-05
Payer: COMMERCIAL

## 2023-10-05 VITALS
HEART RATE: 56 BPM | RESPIRATION RATE: 16 BRPM | SYSTOLIC BLOOD PRESSURE: 114 MMHG | WEIGHT: 213 LBS | HEIGHT: 63 IN | OXYGEN SATURATION: 98 % | DIASTOLIC BLOOD PRESSURE: 74 MMHG | BODY MASS INDEX: 37.74 KG/M2 | TEMPERATURE: 98 F

## 2023-10-05 DIAGNOSIS — J34.89 SINUS PRESSURE: Primary | ICD-10-CM

## 2023-10-05 DIAGNOSIS — J03.90 TONSILLITIS: ICD-10-CM

## 2023-10-05 DIAGNOSIS — R07.0 THROAT PAIN: ICD-10-CM

## 2023-10-05 DIAGNOSIS — R23.8 SCALP IRRITATION: ICD-10-CM

## 2023-10-05 DIAGNOSIS — R50.9 LOW GRADE FEVER: ICD-10-CM

## 2023-10-05 PROCEDURE — 99213 OFFICE O/P EST LOW 20 MIN: CPT | Performed by: PHYSICIAN ASSISTANT

## 2023-10-05 RX ORDER — METHYLPREDNISOLONE 4 MG
TABLET, DOSE PACK ORAL
Qty: 21 TABLET | Refills: 0 | Status: SHIPPED | OUTPATIENT
Start: 2023-10-05 | End: 2023-10-18

## 2023-10-05 RX ORDER — CEPHALEXIN 500 MG/1
500 CAPSULE ORAL 3 TIMES DAILY
Qty: 30 CAPSULE | Refills: 0 | OUTPATIENT
Start: 2023-10-05

## 2023-10-05 ASSESSMENT — PAIN SCALES - GENERAL: PAINLEVEL: NO PAIN (0)

## 2023-10-05 NOTE — PROGRESS NOTES
"  Assessment & Plan     Sinus pressure  Sinus pain and pressure is improving but she is worried that the weekend is coming and she wants to feel better by the weekend.  Advised that she continue with her course of antibiotics as prescribed at previous visit and add steroids in the next to see if we get that the inflammation that is giving her some of her signs and symptoms as well.  Overall thought process is that this is not necessarily a bacterial process but more likely chronic inflammation that is giving her some pressure.  Follow-up with her primary care provider as needed.  - methylPREDNISolone (MEDROL DOSEPAK) 4 MG tablet therapy pack; Follow Package Directions     Abiel Reyna PA-C  [facility] KIKE Young is a 53 year old, presenting for the following health issues:  Follow Up      10/5/2023     4:58 PM   Additional Questions   Roomed by Edgar CAMERON     Medication Followup of Keflex   Taking Medication as prescribed: yes  Side Effects:  None  Medication Helping Symptoms:  yes, Cough productive of yellow sputum. Continues to have facial/sinus pressure pain. Fevers and throat pain resolved.      Review of Systems   Constitutional, HEENT, cardiovascular, pulmonary, GI, , musculoskeletal, neuro, skin, endocrine and psych systems are negative, except as otherwise noted.      Objective    /74   Pulse 56   Temp 98  F (36.7  C) (Temporal)   Resp 16   Ht 5' 2.99\" (1.6 m)   Wt 213 lb (96.6 kg)   SpO2 98%   BMI 37.74 kg/m    Body mass index is 37.74 kg/m .  Physical Exam   GENERAL: healthy, alert and no distress  HEENT:    Head normocephalic, hair normal, skin normal.    Eyes: CARINA/EOM  Ears:  Bilateral EAC's clear, TM's gray with bony landmarks and light reflex normal, negative effusion.  Nose: Bilateral nares patent, tissue pink no discharge noted.  Throat:  oral pharynx with good hydration, negative for tonsillar hypertrophy, negative pitting, ulceration or " exudates.  NECK: no adenopathy, no asymmetry, masses, or scars and thyroid normal to palpation  RESP: lungs clear to auscultation - no rales, rhonchi or wheezes  CV: regular rate and rhythm, normal S1 S2, no S3 or S4, no murmur, click or rub, no peripheral edema and peripheral pulses strong  ABDOMEN: soft, nontender, no hepatosplenomegaly, no masses and bowel sounds normal  MS: no gross musculoskeletal defects noted, no edema    No results found for this or any previous visit (from the past 24 hour(s)).

## 2023-10-09 NOTE — PROGRESS NOTES
Faxed Form October 9, 2023 to fax number [phone]    Right Fax confirmed at 12:30 PM    Eduardo Pedersen

## 2023-10-18 ENCOUNTER — E-VISIT (OUTPATIENT)
Dept: FAMILY MEDICINE | Facility: OTHER | Age: 53
End: 2023-10-18

## 2023-10-18 DIAGNOSIS — J06.9 UPPER RESPIRATORY TRACT INFECTION, UNSPECIFIED TYPE: Primary | ICD-10-CM

## 2023-10-18 PROCEDURE — 99207 PR NON-BILLABLE SERV PER CHARTING: CPT | Performed by: PHYSICIAN ASSISTANT

## 2023-10-19 ENCOUNTER — TELEPHONE (OUTPATIENT)
Dept: FAMILY MEDICINE | Facility: OTHER | Age: 53
End: 2023-10-19
Payer: COMMERCIAL

## 2023-10-19 ASSESSMENT — PATIENT HEALTH QUESTIONNAIRE - PHQ9: SUM OF ALL RESPONSES TO PHQ QUESTIONS 1-9: 9

## 2023-10-19 NOTE — TELEPHONE ENCOUNTER
"Hold for Dr. Paz's return to clinic please. Patient wanting to know if Dr. Paz is able to see her on Tuesday (aware out of office til then and only in Tuesday a.m. and then Friday)  she does have a phone visit scheduled with her on Friday;  would prefer clinic appointment if possible earlier.  Aware may not get call back til next week when PCP in clinic.    Her mother  last month.  Patient states has been struggling with depression since then.  States no suicidal thoughts (PHQ9 done).  Primary concern is her lack of wanting to do anything; feels could just sleep.  States struggles to get up and get anything done or go anywhere.  Does have her \"motorcycle gals\" that she talks to once in awhile that she states are her support group.  Also has support of .  Is taking her wellbutrin and lexapro as ordered.  States hasn't needed the hydroxyzine for quite awhile.  Wondering if can possibly increase dose of med or what can be done to help get her out of her \"slump\" since mother .  PHQ-9 score:  9.  No suicidal thoughts    Please advise on possible add on next week when you are back in clinic.  Thank you  Alisa MARTIN RN.             "

## 2023-10-19 NOTE — TELEPHONE ENCOUNTER
Patient just called to request to see provider sooner than next Friday. She said she wants to talk as soon as she is able about her medications and would like to get back to her bubbly self. She said she has no feelings of hurting herself and she could get to next Friday if she has to but would prefer to try to get help now. Advised her of provider's lack of appt options and that if she wants to see someone else we may have more options with other providers but she wanted to stick with Dr. Paz. Also advised her that if she needed to speak with someone in the meantime to call us back.     Possible work in request for Tuesday?

## 2023-10-24 NOTE — TELEPHONE ENCOUNTER
In secure chat, had offered to do tele visit this am, so if she does call back, I would be willing to do telephone visit with her sometime before 12:30 today if she is willing to answer the call whenever I have time between patients (I can't give her an exact time).  Otherwise, we do have a visit on Friday--if she prefers to be seen in person on Friday instead of a tele visit, I would be happy to see her at that time too

## 2023-10-24 NOTE — TELEPHONE ENCOUNTER
Spoke with pt, she would very much like to see provider in person so we changed her Friday appt to an inperson appointment.  She really appreciates the opportunity to be in person.

## 2023-10-26 ASSESSMENT — PATIENT HEALTH QUESTIONNAIRE - PHQ9
SUM OF ALL RESPONSES TO PHQ QUESTIONS 1-9: 5
10. IF YOU CHECKED OFF ANY PROBLEMS, HOW DIFFICULT HAVE THESE PROBLEMS MADE IT FOR YOU TO DO YOUR WORK, TAKE CARE OF THINGS AT HOME, OR GET ALONG WITH OTHER PEOPLE: SOMEWHAT DIFFICULT
SUM OF ALL RESPONSES TO PHQ QUESTIONS 1-9: 5

## 2023-10-26 ASSESSMENT — ANXIETY QUESTIONNAIRES
6. BECOMING EASILY ANNOYED OR IRRITABLE: NOT AT ALL
7. FEELING AFRAID AS IF SOMETHING AWFUL MIGHT HAPPEN: NOT AT ALL
GAD7 TOTAL SCORE: 0
2. NOT BEING ABLE TO STOP OR CONTROL WORRYING: NOT AT ALL
5. BEING SO RESTLESS THAT IT IS HARD TO SIT STILL: NOT AT ALL
1. FEELING NERVOUS, ANXIOUS, OR ON EDGE: NOT AT ALL
4. TROUBLE RELAXING: NOT AT ALL
IF YOU CHECKED OFF ANY PROBLEMS ON THIS QUESTIONNAIRE, HOW DIFFICULT HAVE THESE PROBLEMS MADE IT FOR YOU TO DO YOUR WORK, TAKE CARE OF THINGS AT HOME, OR GET ALONG WITH OTHER PEOPLE: NOT DIFFICULT AT ALL
3. WORRYING TOO MUCH ABOUT DIFFERENT THINGS: NOT AT ALL
GAD7 TOTAL SCORE: 0

## 2023-10-27 ENCOUNTER — OFFICE VISIT (OUTPATIENT)
Dept: FAMILY MEDICINE | Facility: OTHER | Age: 53
End: 2023-10-27
Payer: COMMERCIAL

## 2023-10-27 VITALS
TEMPERATURE: 98.2 F | OXYGEN SATURATION: 99 % | BODY MASS INDEX: 38.27 KG/M2 | WEIGHT: 216 LBS | HEIGHT: 63 IN | HEART RATE: 61 BPM | DIASTOLIC BLOOD PRESSURE: 64 MMHG | RESPIRATION RATE: 14 BRPM | SYSTOLIC BLOOD PRESSURE: 116 MMHG

## 2023-10-27 DIAGNOSIS — F33.1 MAJOR DEPRESSIVE DISORDER, RECURRENT EPISODE, MODERATE (H): Primary | ICD-10-CM

## 2023-10-27 DIAGNOSIS — F43.21 GRIEF REACTION: ICD-10-CM

## 2023-10-27 PROCEDURE — 99214 OFFICE O/P EST MOD 30 MIN: CPT | Performed by: FAMILY MEDICINE

## 2023-10-27 RX ORDER — BUPROPION HYDROCHLORIDE 450 MG/1
450 TABLET, FILM COATED, EXTENDED RELEASE ORAL EVERY MORNING
Qty: 90 TABLET | Refills: 3 | Status: SHIPPED | OUTPATIENT
Start: 2023-10-27 | End: 2024-03-08

## 2023-10-27 RX ORDER — COLLAGENASE CLOSTRIDIUM HIST.
POWDER (EA) MISCELLANEOUS
COMMUNITY

## 2023-10-27 ASSESSMENT — PAIN SCALES - GENERAL: PAINLEVEL: NO PAIN (0)

## 2023-10-27 NOTE — PROGRESS NOTES
Assessment & Plan     Major depressive disorder, recurrent episode, moderate (H)/Grief reaction  Encouraged her to seek out a grief support group.  In the meantime we will increase her bupropion from 300 mg to 450 mg.  We will continue her escitalopram at 15 mg daily.  Recommend she try to continue exercising.  Patient inquires about hormones.  She currently has a Mirena IUD.  Discussed that that we can address this after we control her mood.  We may need to consider removal of IUD and hormone replacement at that time.  She will follow-up with me in 1 month.    - buPROPion 450 MG TB24; Take 450 mg by mouth every morning    MD GÓMEZ Gutierrez [facility] KIKE Young is a 53 year old, presenting for the following health issues:   Follow Up      10/27/2023     3:19 PM   Additional Questions   Roomed by Arcelia MAGAÑA         10/27/2023     3:19 PM   Patient Reported Additional Medications   Patient reports taking the following new medications collagen powder       History of Present Illness       Mental Health Follow-up:  Patient presents to follow-up on Depression.Patient's depression since last visit has been:  Worse  The patient is having other symptoms associated with depression.      Any significant life events: grief or loss  Patient is not feeling anxious or having panic attacks.  Patient has no concerns about alcohol or drug use.    She eats 2-3 servings of fruits and vegetables daily.She consumes 0 sweetened beverage(s) daily.She exercises with enough effort to increase her heart rate 9 or less minutes per day.  She exercises with enough effort to increase her heart rate 3 or less days per week.   She is taking medications regularly.     The patient is here for a follow-up.    Her mother recently passed away. She finds herself wanting to climb in a hole and sleep. Last week was hard for her. She is dreaming and going through things. She is feeling a lot more tired all the  "time. Her head is in a fog. She is going through emotions. The weather is getting darker and changing. She knows she is not going to do anything to herself, but she does not want to get too far into a hole where she can not get out. Her  does not know how to help her.  She kept napping in the chair. She feels her mood has declined a lot since her mother passed away. She feels like she does not have any closure.  Her mother did not want a  or any get-together.  She has not looked for any support groups. Her ex-sister-in-law told her there is a [employer] down the street, but she is not ready for that yet. She has talked to her other girlfriends, and they are keeping tabs on her how she is doing. She is forcing herself to get out and not stay at home. She has gained weight. She does not have anxiety.    She is requesting a hormonal medication to get rid of the sweating. She is forcing herself to go to the gym and work out. She still has her IUD in place, but she is going to have it removed next year.      Objective    /64 (Cuff Size: Adult Large)   Pulse 61   Temp 98.2  F (36.8  C) (Oral)   Resp 14   Ht 1.6 m (5' 3\")   Wt 98 kg (216 lb)   SpO2 99%   BMI 38.26 kg/m    Body mass index is 38.26 kg/m .  Physical Exam   Gen: no apparent distress  Psych: Alert and oriented times 3; coherent speech, normal   rate and volume, able to articulate logical thoughts, able   to abstract reason, no tangential thoughts, no hallucinations   or delusions  Her affect is neutral to slightly tearful              "

## 2023-11-13 ASSESSMENT — SLEEP AND FATIGUE QUESTIONNAIRES
HOW LIKELY ARE YOU TO NOD OFF OR FALL ASLEEP WHILE SITTING AND READING: SLIGHT CHANCE OF DOZING
HOW LIKELY ARE YOU TO NOD OFF OR FALL ASLEEP WHILE SITTING INACTIVE IN A PUBLIC PLACE: WOULD NEVER DOZE
HOW LIKELY ARE YOU TO NOD OFF OR FALL ASLEEP WHILE WATCHING TV: MODERATE CHANCE OF DOZING
HOW LIKELY ARE YOU TO NOD OFF OR FALL ASLEEP WHILE LYING DOWN TO REST IN THE AFTERNOON WHEN CIRCUMSTANCES PERMIT: MODERATE CHANCE OF DOZING
HOW LIKELY ARE YOU TO NOD OFF OR FALL ASLEEP WHILE SITTING QUIETLY AFTER LUNCH WITHOUT ALCOHOL: SLIGHT CHANCE OF DOZING
HOW LIKELY ARE YOU TO NOD OFF OR FALL ASLEEP WHILE SITTING AND TALKING TO SOMEONE: SLIGHT CHANCE OF DOZING
HOW LIKELY ARE YOU TO NOD OFF OR FALL ASLEEP WHEN YOU ARE A PASSENGER IN A CAR FOR AN HOUR WITHOUT A BREAK: MODERATE CHANCE OF DOZING
HOW LIKELY ARE YOU TO NOD OFF OR FALL ASLEEP IN A CAR, WHILE STOPPED FOR A FEW MINUTES IN TRAFFIC: WOULD NEVER DOZE

## 2023-11-14 ENCOUNTER — OFFICE VISIT (OUTPATIENT)
Dept: SLEEP MEDICINE | Facility: CLINIC | Age: 53
End: 2023-11-14
Attending: PHYSICIAN ASSISTANT
Payer: COMMERCIAL

## 2023-11-14 VITALS
BODY MASS INDEX: 38.98 KG/M2 | OXYGEN SATURATION: 99 % | HEIGHT: 63 IN | DIASTOLIC BLOOD PRESSURE: 68 MMHG | WEIGHT: 220 LBS | HEART RATE: 61 BPM | SYSTOLIC BLOOD PRESSURE: 110 MMHG

## 2023-11-14 DIAGNOSIS — G47.19 EXCESSIVE DAYTIME SLEEPINESS: Primary | ICD-10-CM

## 2023-11-14 DIAGNOSIS — E66.01 SEVERE OBESITY (BMI 35.0-39.9) WITH COMORBIDITY (H): ICD-10-CM

## 2023-11-14 DIAGNOSIS — R06.83 SNORING: ICD-10-CM

## 2023-11-14 DIAGNOSIS — R53.83 FATIGUE, UNSPECIFIED TYPE: ICD-10-CM

## 2023-11-14 PROCEDURE — 99203 OFFICE O/P NEW LOW 30 MIN: CPT | Performed by: OTOLARYNGOLOGY

## 2023-11-14 ASSESSMENT — SLEEP AND FATIGUE QUESTIONNAIRES
HOW LIKELY ARE YOU TO NOD OFF OR FALL ASLEEP WHEN YOU ARE A PASSENGER IN A CAR FOR AN HOUR WITHOUT A BREAK: MODERATE CHANCE OF DOZING
HOW LIKELY ARE YOU TO NOD OFF OR FALL ASLEEP WHILE SITTING INACTIVE IN A PUBLIC PLACE: WOULD NEVER DOZE
HOW LIKELY ARE YOU TO NOD OFF OR FALL ASLEEP WHILE SITTING QUIETLY AFTER LUNCH WITHOUT ALCOHOL: SLIGHT CHANCE OF DOZING
HOW LIKELY ARE YOU TO NOD OFF OR FALL ASLEEP WHILE LYING DOWN TO REST IN THE AFTERNOON WHEN CIRCUMSTANCES PERMIT: MODERATE CHANCE OF DOZING
HOW LIKELY ARE YOU TO NOD OFF OR FALL ASLEEP WHILE SITTING AND TALKING TO SOMEONE: SLIGHT CHANCE OF DOZING
HOW LIKELY ARE YOU TO NOD OFF OR FALL ASLEEP WHILE WATCHING TV: MODERATE CHANCE OF DOZING
HOW LIKELY ARE YOU TO NOD OFF OR FALL ASLEEP IN A CAR, WHILE STOPPED FOR A FEW MINUTES IN TRAFFIC: WOULD NEVER DOZE
HOW LIKELY ARE YOU TO NOD OFF OR FALL ASLEEP WHILE SITTING AND READING: SLIGHT CHANCE OF DOZING
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
## 2023-11-20 ENCOUNTER — VIRTUAL VISIT (OUTPATIENT)
Dept: FAMILY MEDICINE | Facility: CLINIC | Age: 53
End: 2023-11-20
Payer: COMMERCIAL

## 2023-11-20 DIAGNOSIS — J01.90 ACUTE SINUSITIS WITH SYMPTOMS > 10 DAYS: Primary | ICD-10-CM

## 2023-11-20 PROCEDURE — 99213 OFFICE O/P EST LOW 20 MIN: CPT | Mod: VID | Performed by: FAMILY MEDICINE

## 2023-11-20 RX ORDER — BENZONATATE 100 MG/1
100 CAPSULE ORAL 3 TIMES DAILY PRN
Qty: 45 CAPSULE | Refills: 0 | Status: SHIPPED | OUTPATIENT
Start: 2023-11-20 | End: 2023-12-08

## 2023-11-20 NOTE — COMMUNITY RESOURCES LIST (ENGLISH)
11/20/2023   [facility] [employer]  N/A  For questions about this resource list or additional care needs, please contact your primary care clinic or care manager.  Phone: [phone]   Email: N/A   Address: [address] [place] [id number]   Hours: N/A        Financial Stability       Utility payment assistance  1  [facility] [place] - Emergency Assistance - Utility payment assistance Distance: 7.96 miles      In-Person   [address] [place] [address]  Language: English, Irish  Hours: Mon 10:00 AM - 1:30 PM Appt. Only, Wed 10:00 AM - 1:30 PM Appt. Only, Thu 4:30 PM - 6:30 PM Appt. Only, Fri 10:00 AM - 1:30 PM Appt. Only  Fees: Free   Phone: (508) 228-9864 Email: [email or web address] Website: [email or web address]     2  [facility] - Emergency Assistance - Utility payment assistance Distance: 8.97 miles      In-Person   [address] [place] [address]  Language: English  Hours: Mon - Fri 8:00 AM - 4:30 PM  Fees: Free   Phone: (421) 163-5593 Email: [email or web address]. Website: [email or web address]          Important Numbers & Websites       Emergency Services   911  [facility]   311  Poison Control   (102) 130-8681  Suicide Prevention Lifeline   (472) 185-9951 (TALK)  Child Abuse Hotline   (575) 675-5195 (4-A-Child)  Sexual Assault Hotline   (578) 321-7913 (HOPE)  National Runaway Safeline   (105) 557-1216 (RUNAWAY)  All-Options Talkline   (104) 251-6797  Substance Abuse Referral   (623) 959-1133 (HELP)

## 2023-11-20 NOTE — PROGRESS NOTES
Hannah is a 53 year old who is being evaluated via a billable video visit.      How would you like to obtain your AVS? MyChart  If the video visit is dropped, the invitation should be resent by: Text to cell phone: [phone]  Will anyone else be joining your video visit? No    Assessment & Plan   1. Acute sinusitis with symptoms > 10 days  Patient presenting with greater than 10-day duration of symptoms. Given this, will treat with antibiotic course prescribed below. Discussed completing the entire course of antibiotics. Discussed common side effects of the medication.  Recommend following up if not improving, new symptoms, medication side effects, or if worsening despite treatment. Discussed other conservative cares (nasal saline rinses using distilled water, decongestants, nasal Flonase, etc).  Patient agreeable with plan.  - amoxicillin-clavulanate (AUGMENTIN) 875-125 MG tablet; Take 1 tablet by mouth 2 times daily for 10 days  Dispense: 20 tablet; Refill: 0  - benzonatate (TESSALON) 100 MG capsule; Take 1 capsule (100 mg) by mouth 3 times daily as needed for cough  Dispense: 45 capsule; Refill: 0      Valente Higginbotham MD  [facility]    Disclaimer: This note consists of symbols derived from keyboarding, dictation and/or voice recognition software. As a result, there may be errors in the script that have gone undetected. Please consider this when interpreting information found in this chart.    Subjective   Hannah is a 53 year old, presenting for the following health issues:    HPI     Acute Illness  Acute illness concerns: Sinus infection  Onset/Duration: 11/14/2023  Symptoms:  Fever: 100F  Chills/Sweats: YES  Headache (location?): YES  Sinus Pressure: YES  Conjunctivitis:  No  Ear Pain: YES: bilateral  Rhinorrhea: YES  Congestion: YES  Sore Throat: YES  Cough: YES  Wheeze: No  Decreased Appetite: YES  Nausea: No  Vomiting: No  Diarrhea: No  Dysuria/Freq.: No  Dysuria or  Hematuria: No  Fatigue/Achiness: YES  Sick/Strep Exposure: No  Therapies tried and outcome: Tylenol and pseudoephedrine     Started with sore throat, now sinus pain and pressure. Having post nasal drip and productive cough now.    Review of Systems   Constitutional, HEENT, cardiovascular, pulmonary, GI, , musculoskeletal, neuro, skin, endocrine and psych systems are negative, except as otherwise noted.      Objective       Vitals:  No vitals were obtained today due to virtual visit.    Physical Exam   GENERAL: Healthy, alert and no distress  EYES: Eyes grossly normal to inspection.  No discharge or erythema, or obvious scleral/conjunctival abnormalities.  RESP: No audible wheeze, cough, or visible cyanosis.  No visible retractions or increased work of breathing.    SKIN: Visible skin clear. No significant rash, abnormal pigmentation or lesions.  NEURO: Cranial nerves grossly intact.  Mentation and speech appropriate for age.  PSYCH: Mentation appears normal, affect normal/bright, judgement and insight intact, normal speech and appearance well-groomed.    Labs: none        Video-Visit Details    Type of service:  Video Visit     Originating Location (pt. Location): Home    Distant Location (provider location):  On-site  Platform used for Video Visit: PluggedIn

## 2023-11-22 ENCOUNTER — TELEPHONE (OUTPATIENT)
Dept: FAMILY MEDICINE | Facility: OTHER | Age: 53
End: 2023-11-22
Payer: COMMERCIAL

## 2023-11-22 NOTE — TELEPHONE ENCOUNTER
Medication Question or Refill    Contacts         Type Contact Phone/Fax    11/22/2023 04:23 PM CST Phone (Incoming) Hannah Krishna KERRY (Self) [phone] (M)            What medication are you calling about (include dose and sig)?: amoxicillin-clavulanate (AUGMENTIN) 875-125 MG tablet   benzonatate (TESSALON) 100 MG capsule   Preferred Pharmacy:   [address] [address]  Phone: [phone] Fax: [phone]      Controlled Substance Agreement on file:   CSA -- Patient Level:    CSA: None found at the patient level.       Who prescribed the medication?: Valente Higginbotham MD     Do you need a refill?  No, In need of something different.    When did you use the medication last? 11/22/2023 AM, took coughing one    Patient offered an appointment? No    Do you have any questions or concerns?  Yes: Can I get something else? A different steroid(current one is NOT working) along with a cough medicine as well.  left ear plugged, cough isn't getting better, sinuses are extremely sore, coughing to the point of not sleeping    Could [facility] or would you prefer to receive a phone call?:   Patient would prefer a phone call   Okay to leave a detailed message?: Yes at Cell number on file:    Telephone Information:   Mobile [phone]

## 2023-11-24 NOTE — TELEPHONE ENCOUNTER
RN called patient to relay below recommendations. Patient is on day 5 of treatment and states she still feels the ear pressure and sinus pressure. Patient states she only sees minimal improvement. RN discussed treatment options. Patient states she may go to  this weekend if no more improvement.

## 2023-11-24 NOTE — TELEPHONE ENCOUNTER
Needs follow-up appt TC is out of the office. Treatment for sinusitis can take upward of 5 days to see some changes. Make sure using Flonase, nasal saline rinse, decongestants.     Courtney oRckwell PA-C

## 2023-12-08 ENCOUNTER — OFFICE VISIT (OUTPATIENT)
Dept: FAMILY MEDICINE | Facility: OTHER | Age: 53
End: 2023-12-08
Payer: COMMERCIAL

## 2023-12-08 VITALS
HEIGHT: 63 IN | WEIGHT: 218 LBS | RESPIRATION RATE: 11 BRPM | OXYGEN SATURATION: 97 % | BODY MASS INDEX: 38.62 KG/M2 | TEMPERATURE: 98.1 F | DIASTOLIC BLOOD PRESSURE: 80 MMHG | HEART RATE: 66 BPM | SYSTOLIC BLOOD PRESSURE: 116 MMHG

## 2023-12-08 DIAGNOSIS — F33.1 MAJOR DEPRESSIVE DISORDER, RECURRENT EPISODE, MODERATE (H): Primary | ICD-10-CM

## 2023-12-08 PROCEDURE — 99213 OFFICE O/P EST LOW 20 MIN: CPT | Performed by: FAMILY MEDICINE

## 2023-12-08 ASSESSMENT — ANXIETY QUESTIONNAIRES
4. TROUBLE RELAXING: NOT AT ALL
1. FEELING NERVOUS, ANXIOUS, OR ON EDGE: NOT AT ALL
2. NOT BEING ABLE TO STOP OR CONTROL WORRYING: NOT AT ALL
5. BEING SO RESTLESS THAT IT IS HARD TO SIT STILL: NOT AT ALL
IF YOU CHECKED OFF ANY PROBLEMS ON THIS QUESTIONNAIRE, HOW DIFFICULT HAVE THESE PROBLEMS MADE IT FOR YOU TO DO YOUR WORK, TAKE CARE OF THINGS AT HOME, OR GET ALONG WITH OTHER PEOPLE: SOMEWHAT DIFFICULT
GAD7 TOTAL SCORE: 1
7. FEELING AFRAID AS IF SOMETHING AWFUL MIGHT HAPPEN: NOT AT ALL
GAD7 TOTAL SCORE: 1
3. WORRYING TOO MUCH ABOUT DIFFERENT THINGS: NOT AT ALL
6. BECOMING EASILY ANNOYED OR IRRITABLE: SEVERAL DAYS

## 2023-12-08 ASSESSMENT — PAIN SCALES - GENERAL: PAINLEVEL: NO PAIN (0)

## 2023-12-08 NOTE — PROGRESS NOTES
Assessment & Plan     Major depressive disorder, recurrent episode, moderate (H)  Has improved with increasing her bupropion.  Continue this along with her escitalopram.  Encouraged her to continue with her activities and socialization.    MD GÓMEZ Gutierrez [facility] RENETTA Young is a 53 year old, presenting for the following health issues:   Follow Up      12/8/2023     9:33 AM   Additional Questions   Roomed by Arcelia MAGAÑA         12/8/2023     9:33 AM   Patient Reported Additional Medications   Patient reports taking the following new medications Tumeric w/ black pepper, magnesium       History of Present Illness       Mental Health Follow-up:  Patient presents to follow-up on Depression & Anxiety.Patient's depression since last visit has been:  Better  The patient is not having other symptoms associated with depression.  Patient's anxiety since last visit has been:  Better  The patient is not having other symptoms associated with anxiety.  Any significant life events: grief or loss  Patient is not feeling anxious or having panic attacks.  Patient has no concerns about alcohol or drug use.    Reason for visit:  Recheck    She eats 2-3 servings of fruits and vegetables daily.She consumes 0 sweetened beverage(s) daily.She exercises with enough effort to increase her heart rate 10 to 19 minutes per day.  She exercises with enough effort to increase her heart rate 3 or less days per week.   She is taking medications regularly.     Would like to talk about when she needs her IUD removed.  It was placed in 2018 for menstrual irregularities.  She is not having any bleeding.  However now she is having hot flashes and mood swings.  We discussed following up this spring with the physical and consideration of removal and other treatment of perimenopausal symptoms.    She feels her mood is improved.  She is now able to do more things.  She denies feeling that she is in a fog.  She  "has more motivation to do stuff.  She is getting a tattoo on her left arm to commemorate her mother.  She is able to go out and socialize more.  She denies any tremulousness.  She admits that the holidays are difficult as this is the first time she is doing these without her mother.        Objective    /80 (Cuff Size: Adult Large)   Pulse 66   Temp 98.1  F (36.7  C) (Oral)   Resp 11   Ht 1.6 m (5' 2.99\")   Wt 98.9 kg (218 lb)   SpO2 97%   BMI 38.63 kg/m    Body mass index is 38.63 kg/m .  Physical Exam   Gen: no apparent distress  Psych: Alert and oriented times 3; coherent speech, normal   rate and volume, able to articulate logical thoughts, able   to abstract reason, no tangential thoughts, no hallucinations   or delusions  Her affect is neutral              "

## 2023-12-20 ENCOUNTER — OFFICE VISIT (OUTPATIENT)
Dept: SLEEP MEDICINE | Facility: CLINIC | Age: 53
End: 2023-12-20
Payer: COMMERCIAL

## 2023-12-20 DIAGNOSIS — G47.19 EXCESSIVE DAYTIME SLEEPINESS: ICD-10-CM

## 2023-12-20 DIAGNOSIS — R06.83 SNORING: ICD-10-CM

## 2023-12-20 PROCEDURE — G0399 HOME SLEEP TEST/TYPE 3 PORTA: HCPCS | Performed by: OTOLARYNGOLOGY

## 2023-12-20 NOTE — PROGRESS NOTES
Pt is completing a home sleep test. Pt was instructed on how to put on the Noxturnal T3 device and associated equipment before going to bed and given the opportunity to practice putting it on before leaving the sleep center. Pt was reminded to bring the home sleep test kit back to the center tomorrow, at agreed upon time for download and reporting.   Neck circumference: 40.64 CM / 16 inches.

## 2023-12-21 ENCOUNTER — DOCUMENTATION ONLY (OUTPATIENT)
Dept: SLEEP MEDICINE | Facility: CLINIC | Age: 53
End: 2023-12-21
Payer: COMMERCIAL

## 2023-12-21 NOTE — PROGRESS NOTES
HST POST-STUDY QUESTIONNAIRE    What time did you go to bed?  930p  How long do you think it took to fall asleep?  15-20m  What time did you wake up to start the day?  8a  Did you get up during the night at all?  n  If you woke up, do you remember approximately what time(s)? NA  Did you have any difficulty with the equipment?  No  Did you us any type of treatment with this study?  None  Was the head of the bed elevated? No  Did you sleep in a recliner?  No  Did you stop using CPAP at least 3 days before this test?  NA  Any other information you'd like us to know? NA

## 2023-12-21 NOTE — PROCEDURES
"HOME SLEEP STUDY INTERPRETATION    Patient: Hannah Krishna  MRN: [id number]  YOB: 1970  Study Date: 12/20/2023  Referring Provider: Merlene Paz MD  Ordering Provider: Gunnar Kirk MD     Indications for Home Study: Hannah Krishna is a 53 year old female who presents with symptoms suggestive of obstructive sleep apnea.    Estimated body mass index is 38.63 kg/m  as calculated from the following:    Height as of 12/8/23: 1.6 m (5' 2.99\").    Weight as of 12/8/23: 98.9 kg (218 lb).  Total score - [place]: 9 (11/14/2023  8:45 AM)  Total Score: 6 (11/14/2023  8:45 AM)    Data: A full night home sleep study was performed recording the standard physiologic parameters including body position, movement, sound, nasal pressure, thermal oral airflow, chest and abdominal movements with respiratory inductance plethysmography, and oxygen saturation by pulse oximetry. Pulse rate was estimated by oximetry recording. This study was considered adequate based on > 4 hours of quality oximetry and respiratory recording. As specified by the AASM Manual for the Scoring of Sleep and Associated events, version 2.3, Rule VIII.D 1B, 4% oxygen desaturation scoring for hypopneas is used as a standard of care on all home sleep apnea testing.    Analysis Time:  618 minutes    Respiration:   Sleep Associated Hypoxemia: sustained hypoxemia was not present. Baseline oxygen saturation was 94%.  Time with saturation less than or equal to 88% was 0.1 minutes. The lowest oxygen saturation was 87%.   Snoring: Snoring was present.  Respiratory events: The home study revealed a presence of 20 obstructive apneas and 8 mixed and central apneas. There were 78 hypopneas resulting in a combined apnea/hypopnea index [AHI] of 10.3 events per hour.  AHI was 13.8 per hour supine, 0 per hour prone, 7.7 per hour on left side, and N/A per hour on right side.   Pattern: Excluding events noted above, respiratory rate and pattern was " Normal.    Position: Percent of time spent: supine - 50.7%, prone - 6.2%, on left - 43.1%, on right - 0%.    Heart Rate: By pulse oximetry normal rate was noted.     Assessment:   Mild obstructive sleep apnea.  Sleep associated hypoxemia was not present.    Recommendations:  Consider auto-CPAP at 5-15 cmH2O, oral appliance therapy, or positional therapy.  Suggest optimizing sleep hygiene and avoiding sleep deprivation.  Weight management.    Diagnosis Code(s): Obstructive Sleep Apnea G47.33    Shaun Manzo DO, December 21, 2023   Diplomate, American Board of Internal Medicine, Sleep Medicine

## 2023-12-21 NOTE — PROGRESS NOTES
This HSAT was performed using a Noxturnal T3 device which recorded snore, sound, movement activity, body position, nasal pressure, oronasal thermal airflow, pulse, oximetry and both chest and abdominal respiratory effort. HSAT data was restricted to the time patient states they were in bed.     HSAT was scored using 1B 4% hypopnea rule.     HST AHI (Non-PAT): 10.3  Snoring was reported as mild and moderate.  Time with SpO2 below 89% was 0.1 minutes.   Overall signal quality was good.     Pt will follow up with sleep provider to determine appropriate therapy.

## 2024-01-08 ENCOUNTER — TELEPHONE (OUTPATIENT)
Dept: NEUROSURGERY | Facility: CLINIC | Age: 54
End: 2024-01-08
Payer: COMMERCIAL

## 2024-01-08 NOTE — TELEPHONE ENCOUNTER
Reviewed patient chart in preparation for appointment with Dr. Burnette Thursday. Patient reports rebound pain from before lumbar spine surgery. Pain returned a few weeks ago. Nothing caused onset. Same pain as pre-op. This was a WC case. WC has been in recent contact with patient. Patient is starting new job soon. 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 Hasn't worked in over 1 year.    Current symptoms. Numb across her back. Wondering if this will this ever get better? Denies numbness, tingling, or weakness to BLE.    I rescheduled to [facility] schedule tomorrow for return visit. Was last seen 1.5 years ago.    Sunita Estrada RN on 1/8/2024 at 2:46 PM

## 2024-01-09 ENCOUNTER — OFFICE VISIT (OUTPATIENT)
Dept: NEUROSURGERY | Facility: CLINIC | Age: 54
End: 2024-01-09
Payer: OTHER MISCELLANEOUS

## 2024-01-09 VITALS — OXYGEN SATURATION: 96 % | SYSTOLIC BLOOD PRESSURE: 137 MMHG | HEART RATE: 66 BPM | DIASTOLIC BLOOD PRESSURE: 80 MMHG

## 2024-01-09 DIAGNOSIS — Z98.890 HX OF SPINAL SURGERY: ICD-10-CM

## 2024-01-09 DIAGNOSIS — R20.0 NUMBNESS: ICD-10-CM

## 2024-01-09 DIAGNOSIS — Z98.890 S/P SPINAL SURGERY: Primary | ICD-10-CM

## 2024-01-09 PROCEDURE — 99213 OFFICE O/P EST LOW 20 MIN: CPT

## 2024-01-09 ASSESSMENT — PAIN SCALES - GENERAL: PAINLEVEL: MILD PAIN (2)

## 2024-01-09 NOTE — PROGRESS NOTES
"[facility] Neurosurgery  Neurosurgery Follow Up:    HPI: 53 year old female with past surgical history of Right T9-T10 laminotomies, medial facetectomy with Dr. Burnette on 3/2/2022. Per chart review, pre-operative symptoms included right mid-back pain radiating to the right side, no leg symptoms. At 12-week post-op appointment patient reported complete resolution of radicular symptoms, patient was advised to return to work part-time and then gradually returned to work full-time.    Patient presents to clinic today for evaluation of ongoing numbness and \"pressure\" sensation on right side of thoracic incision.  States right-sided numbness developed postoperatively and has been ongoing.  Denies right-sided radicular symptoms similar to preoperative symptoms.  Denies recent fall/trauma.  Denies upper extremity and lower radicular symptoms, paresthesias, overt weakness.  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    Patient also notes she was contacted by her work compensation representative stating that they are unable to close her  case file, asking for the neurosurgery clinic to \"rate her permanency of disability\".    Medical, surgical, family, and social history unchanged since prior exam.  Exam:  Constitutional:  Alert, well nourished, NAD.  HEENT: Normocephalic, atraumatic.   Pulm:  Without shortness of breath   CV:  No pitting edema of BLE.      Vital Signs:  /80   Pulse 66   SpO2 96%     Neurological:  Awake  Alert  Appropriate  Motor exam:    Strength 5/5 BUE/BLE  Able to spontaneously move BUE/BLE bilaterally  Sensation intact throughout all BUE/BLE dermatomes  Decreased sensation over the area laterally to the right of surgical incision  Positive Ayad's bilaterally   Incisions: Well-healed    A/P:    53 year old female with past surgical history of Right " "T9-T10 laminotomies, medial facetectomy with Dr. Burnette on 3/2/2022. Per chart review, pre-operative symptoms included right mid-back pain radiating to the right side, no leg symptoms. At 12-week post-op appointment patient reported complete resolution of radicular symptoms, patient was advised to return to work part-time and then gradually returned to work full-time.  Patient presents to clinic today for evaluation of ongoing numbness to the right of her surgical incision that developed postoperatively and has been ongoing.  She describes the sensation as \"pressure-like\".  Denies right-sided radicular symptoms, recent fall/trauma.  Since patient is approximately 18 months postop with ongoing symptoms, plan will be to obtain thoracic MRI with and without contrast to further evaluate.  Once imaging is obtained, NSGY clinic will contact the patient to discuss results.  Will also contact work compensation representative to discuss \" rate permanency of disability\".    - Call the clinic at [phone] for any other questions and concerns.    Patient verbalized understanding and is in agreement with the plan.       Lisa Elias PA-C  [facility] Neurosurgery  [address] [place] [address]  [address] [place] [id number]    "

## 2024-01-09 NOTE — LETTER
"    1/9/2024         RE: Hannah Krishna  [address]  [place] [address]        Dear Colleague,    Thank you for referring your patient, Hannah Krishna, to the [facility]. Please see a copy of my visit note below.    [facility] Neurosurgery  Neurosurgery Follow Up:    HPI: 53 year old female with past surgical history of Right T9-T10 laminotomies, medial facetectomy with Dr. Burnette on 3/2/2022. Per chart review, pre-operative symptoms included right mid-back pain radiating to the right side, no leg symptoms. At 12-week post-op appointment patient reported complete resolution of radicular symptoms, patient was advised to return to work part-time and then gradually returned to work full-time.    Patient presents to clinic today for evaluation of ongoing numbness and \"pressure\" sensation on right side of thoracic incision.  States right-sided numbness developed postoperatively and has been ongoing.  Denies right-sided radicular symptoms similar to preoperative symptoms.  Denies recent fall/trauma.  Denies upper extremity and lower radicular symptoms, paresthesias, overt weakness.  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    Patient also notes she was contacted by her work compensation representative stating that they are unable to close her  case file, asking for the neurosurgery clinic to \"rate her permanency of disability\".    Medical, surgical, family, and social history unchanged since prior exam.  Exam:  Constitutional:  Alert, well nourished, NAD.  HEENT: Normocephalic, atraumatic.   Pulm:  Without shortness of breath   CV:  No pitting edema of BLE.      Vital Signs:  /80   Pulse 66   SpO2 96%     Neurological:  Awake  Alert  Appropriate  Motor exam:    Strength 5/5 BUE/BLE  Able to spontaneously move BUE/BLE " "bilaterally  Sensation intact throughout all BUE/BLE dermatomes  Decreased sensation over the area laterally to the right of surgical incision  Positive Ayad's bilaterally   Incisions: Well-healed    A/P:    53 year old female with past surgical history of Right T9-T10 laminotomies, medial facetectomy with Dr. Burnette on 3/2/2022. Per chart review, pre-operative symptoms included right mid-back pain radiating to the right side, no leg symptoms. At 12-week post-op appointment patient reported complete resolution of radicular symptoms, patient was advised to return to work part-time and then gradually returned to work full-time.  Patient presents to clinic today for evaluation of ongoing numbness to the right of her surgical incision that developed postoperatively and has been ongoing.  She describes the sensation as \"pressure-like\".  Denies right-sided radicular symptoms, recent fall/trauma.  Since patient is approximately 18 months postop with ongoing symptoms, plan will be to obtain thoracic MRI with and without contrast to further evaluate.  Once imaging is obtained, NSGY clinic will contact the patient to discuss results.  Will also contact work compensation representative to discuss \" rate permanency of disability\".    - Call the clinic at [phone] for any other questions and concerns.    Patient verbalized understanding and is in agreement with the plan.       Lisa Elias PA-C  [facility] Neurosurgery  [address] [place] [address]      Again, thank you for allowing me to participate in the care of your patient.        Sincerely,        Lisa Elias PA-C  "

## 2024-01-09 NOTE — PATIENT INSTRUCTIONS
Order placed for thoracic MRI.  Schedulers will contact you within the next day or 2 to schedule the appointment.  Once images are obtained and reviewed, our clinic will contact you to discuss the results.

## 2024-01-09 NOTE — NURSING NOTE
"Hananh Krishna is a 53 year old female who presents for:  Chief Complaint   Patient presents with    Consult     Numbness across ride side low back, same level as the incision and then \"tightness/pressure\" that's uncomfortable. Pain early last week that lasted a second that was like a one or two        Initial Vitals:  /80   Pulse 66   SpO2 96%  Estimated body mass index is 38.63 kg/m  as calculated from the following:    Height as of 12/8/23: 5' 2.99\" (1.6 m).    Weight as of 12/8/23: 218 lb (98.9 kg).. There is no height or weight on file to calculate BSA. BP completed using cuff size: regular  Mild Pain (2)    Nursing Comments:     Juan Dobbs    "

## 2024-01-15 ENCOUNTER — OFFICE VISIT (OUTPATIENT)
Dept: SLEEP MEDICINE | Facility: CLINIC | Age: 54
End: 2024-01-15
Payer: COMMERCIAL

## 2024-01-15 VITALS
HEIGHT: 63 IN | SYSTOLIC BLOOD PRESSURE: 100 MMHG | HEART RATE: 62 BPM | BODY MASS INDEX: 39.09 KG/M2 | OXYGEN SATURATION: 98 % | DIASTOLIC BLOOD PRESSURE: 64 MMHG | WEIGHT: 220.6 LBS

## 2024-01-15 DIAGNOSIS — G47.30 MILD SLEEP APNEA: Primary | ICD-10-CM

## 2024-01-15 PROCEDURE — 99213 OFFICE O/P EST LOW 20 MIN: CPT | Performed by: PHYSICIAN ASSISTANT

## 2024-01-15 ASSESSMENT — SLEEP AND FATIGUE QUESTIONNAIRES
HOW LIKELY ARE YOU TO NOD OFF OR FALL ASLEEP WHEN YOU ARE A PASSENGER IN A CAR FOR AN HOUR WITHOUT A BREAK: SLIGHT CHANCE OF DOZING
HOW LIKELY ARE YOU TO NOD OFF OR FALL ASLEEP WHILE SITTING AND TALKING TO SOMEONE: WOULD NEVER DOZE
HOW LIKELY ARE YOU TO NOD OFF OR FALL ASLEEP WHILE LYING DOWN TO REST IN THE AFTERNOON WHEN CIRCUMSTANCES PERMIT: SLIGHT CHANCE OF DOZING
HOW LIKELY ARE YOU TO NOD OFF OR FALL ASLEEP WHILE SITTING QUIETLY AFTER LUNCH WITHOUT ALCOHOL: WOULD NEVER DOZE
HOW LIKELY ARE YOU TO NOD OFF OR FALL ASLEEP WHILE SITTING INACTIVE IN A PUBLIC PLACE: WOULD NEVER DOZE
HOW LIKELY ARE YOU TO NOD OFF OR FALL ASLEEP IN A CAR, WHILE STOPPED FOR A FEW MINUTES IN TRAFFIC: WOULD NEVER DOZE
HOW LIKELY ARE YOU TO NOD OFF OR FALL ASLEEP WHILE SITTING AND READING: HIGH CHANCE OF DOZING
HOW LIKELY ARE YOU TO NOD OFF OR FALL ASLEEP WHILE WATCHING TV: SLIGHT CHANCE OF DOZING

## 2024-01-15 NOTE — PROGRESS NOTES
Does Hannah have a CPAP/Bipap?  No     Halliday Sleep Scale:  6    Sleep Study Follow-Up Visit:    Date on this visit: 1/15/2024    Hannha Krishna comes in today for follow-up of her home sleep study done on 12/20/23  for loud snoring, excessive daytime sleepiness, and possible sleep apnea.    AHI was 10.3, without desaturations.  Supine RDI 13.8, consistent with mild SUPINE ANKIT.  These findings were reviewed with patient.     Past medical/surgical history, family history, social history, medications and allergies were reviewed.      Problem List:  Patient Active Problem List    Diagnosis Date Noted    Low grade fever 09/29/2023     Priority: Medium    Acute rhinitis 09/29/2023     Priority: Medium    Snoring 09/29/2023     Priority: Medium    Chronic right-sided thoracic back pain 01/26/2021     Priority: Medium    Fusion of spine of cervical region 01/10/2020     Priority: Medium    IUD (intrauterine device) in place 04/09/2018     Priority: Medium     Mirena placed 4/9/18 for abnormal uterine bleeding.  Due for removal April 2023.        Anxiety 07/31/2013     Priority: Medium    Benign essential hypertension 07/17/2013     Priority: Medium    Major depressive disorder, recurrent episode, moderate (H) 12/12/2011     Priority: Medium    Severe obesity (BMI 35.0-39.9) with comorbidity (H) 04/11/2001     Priority: Medium        Impression/Plan:    Mild sleep apnea- we discussed sleep positioning, oral appliance, weight loss, and CPAP. She is looking at weight loss options, at this point with precede with left sided sleeping to improve snoring and apnea. If she would like to try more aggressive treatment she will contact the sleep center.   She will follow up with me as needed.     Fifteen minutes spent with patient, all of which were spent face-to-face counseling, consulting, coordinating plan of care.          CC: Merlene Paz

## 2024-01-26 ENCOUNTER — HOSPITAL ENCOUNTER (OUTPATIENT)
Dept: MRI IMAGING | Facility: CLINIC | Age: 54
Discharge: HOME | End: 2024-01-26
Payer: OTHER MISCELLANEOUS

## 2024-01-26 DIAGNOSIS — R20.0 NUMBNESS: ICD-10-CM

## 2024-01-26 DIAGNOSIS — Z98.890 HX OF SPINAL SURGERY: ICD-10-CM

## 2024-01-26 PROCEDURE — 255N000002 HC RX 255 OP 636

## 2024-01-26 PROCEDURE — A9585 GADOBUTROL INJECTION: HCPCS

## 2024-01-26 PROCEDURE — 72157 MRI CHEST SPINE W/O & W/DYE: CPT

## 2024-01-26 RX ORDER — GADOBUTROL 604.72 MG/ML
10 INJECTION INTRAVENOUS ONCE
Status: COMPLETED | OUTPATIENT
Start: 2024-01-26 | End: 2024-01-26

## 2024-01-26 RX ADMIN — GADOBUTROL 10 ML: 604.72 INJECTION INTRAVENOUS at 08:48

## 2024-01-29 ENCOUNTER — TELEPHONE (OUTPATIENT)
Dept: NEUROSURGERY | Facility: CLINIC | Age: 54
End: 2024-01-29
Payer: COMMERCIAL

## 2024-01-29 NOTE — TELEPHONE ENCOUNTER
Reviewed imaging results as stated below.     MRI THORACIC SPINE WITHOUT CONTRAST January 26, 2024 8:58 AM   HISTORY: History of T9-T10 laminotomies and medial facetectomy.  Pressure and numbness near incision site. History of spinal surgery.  Numbness.     TECHNIQUE: Multiplanar multisequence MRI of the thoracic spine without  contrast.     COMPARISON: 10/5/2021.     FINDINGS: Normal thoracic kyphosis. Anterior posterior alignment of  the thoracic spine within normal limits. Vertebral body height is  maintained without evidence of fracture. There are no destructive  osseous lesions.      Visualized thoracic spinal cord is unremarkable.     Paraspinous soft tissues are unremarkable.     Spinal levels:  T1-T2: No herniation. Mild facet arthropathy. No significant neural  foraminal narrowing or canal stenosis.     T2-T3: Trace disc bulge. Mild facet arthropathy. Mild bilateral neural  foraminal narrowing without significant canal stenosis.     T3-T4: Trace disc bulge. Mild facet arthropathy. Mild to moderate left  neural foraminal narrowing. No right neural foraminal narrowing or  canal stenosis.     T4-T5: Disc bulge. Mild facet arthropathy. No significant neural  foraminal narrowing. Mild canal stenosis.     T5-T6: Disc bulge. Mild facet arthropathy. Mild right neural foraminal  narrowing and mild canal stenosis. No significant left neural  foraminal narrowing.     T6-T7: Disc bulge, eccentric to the left. Mild facet arthropathy. Mild  bilateral neural foraminal narrowing and mild to moderate canal  stenosis.      T7-T8: Disc bulge with left and right paracentral disc protrusions.  Mild-to-moderate facet arthropathy. Mild to moderate canal stenosis.  No significant neural foraminal narrowing.      T8-T9: Disc bulge with bilateral paracentral disc protrusions. Mild  facet arthropathy. Mild right neural foraminal narrowing. No left  neural foraminal narrowing. Mild canal stenosis.      T9-T10: Right laminectomy. Trace  "disc bulge. Moderate facet  arthropathy. Moderate bilateral neural foraminal narrowing without  significant canal stenosis. Enhancement is noted in the right  posterolateral aspect of the canal, outside the thecal sac, as well as  within the right neural foramen, likely reflecting scarring.     T10-T11: Disc bulge. Moderate facet arthropathy. Mild right and  moderate left neural foraminal narrowing and mild canal stenosis.      T11-T12: Disc bulge. Moderate facet arthropathy. Mild bilateral neural  foraminal narrowing without significant canal stenosis.      T12-L1: No herniation. Mild facet arthropathy. No significant neural  foraminal narrowing or canal stenosis.                                                  IMPRESSION:    1. Postsurgical changes at T9-T10. Enhancement is noted in the right  posterolateral aspect of the spinal canal, outside the thecal sac, and  within the right neural foramen. This is favored to reflect scarring.  2. Otherwise, multilevel thoracic spondylosis is overall similar in  extent compared to prior imaging.      Called patient. Discussed MRI findings as detailed above.   Continue to have numbness on the right side of her thoracic incision.   No new or worsening radicular symptoms, paresthesias, weakness.   Did rediscuss patient needs NSGY to \"rate the permanency of her disability\" for her work comp rep. Provided patient our office fax and telephone number to pass along to the work comp rep.        Advised patient to call our clinic if symptoms persist, change, or worsen.   Patient voiced understanding and agreement with this plan.     Lisa Elias PA-C  [facility]  [address] [place] [id number]       "

## 2024-03-05 ASSESSMENT — ANXIETY QUESTIONNAIRES
IF YOU CHECKED OFF ANY PROBLEMS ON THIS QUESTIONNAIRE, HOW DIFFICULT HAVE THESE PROBLEMS MADE IT FOR YOU TO DO YOUR WORK, TAKE CARE OF THINGS AT HOME, OR GET ALONG WITH OTHER PEOPLE: NOT DIFFICULT AT ALL
5. BEING SO RESTLESS THAT IT IS HARD TO SIT STILL: NOT AT ALL
7. FEELING AFRAID AS IF SOMETHING AWFUL MIGHT HAPPEN: NOT AT ALL
GAD7 TOTAL SCORE: 0
3. WORRYING TOO MUCH ABOUT DIFFERENT THINGS: NOT AT ALL
1. FEELING NERVOUS, ANXIOUS, OR ON EDGE: NOT AT ALL
6. BECOMING EASILY ANNOYED OR IRRITABLE: NOT AT ALL
2. NOT BEING ABLE TO STOP OR CONTROL WORRYING: NOT AT ALL
4. TROUBLE RELAXING: NOT AT ALL

## 2024-03-05 ASSESSMENT — PATIENT HEALTH QUESTIONNAIRE - PHQ9: SUM OF ALL RESPONSES TO PHQ QUESTIONS 1-9: 0

## 2024-03-08 ENCOUNTER — OFFICE VISIT (OUTPATIENT)
Dept: FAMILY MEDICINE | Facility: OTHER | Age: 54
End: 2024-03-08
Payer: COMMERCIAL

## 2024-03-08 VITALS
TEMPERATURE: 97.1 F | HEART RATE: 61 BPM | RESPIRATION RATE: 12 BRPM | OXYGEN SATURATION: 97 % | HEIGHT: 63 IN | DIASTOLIC BLOOD PRESSURE: 70 MMHG | WEIGHT: 218 LBS | SYSTOLIC BLOOD PRESSURE: 120 MMHG | BODY MASS INDEX: 38.62 KG/M2

## 2024-03-08 DIAGNOSIS — Z12.31 VISIT FOR SCREENING MAMMOGRAM: ICD-10-CM

## 2024-03-08 DIAGNOSIS — I10 BENIGN ESSENTIAL HYPERTENSION: ICD-10-CM

## 2024-03-08 DIAGNOSIS — E66.01 SEVERE OBESITY (BMI 35.0-39.9) WITH COMORBIDITY (H): ICD-10-CM

## 2024-03-08 DIAGNOSIS — Z00.00 ROUTINE GENERAL MEDICAL EXAMINATION AT A HEALTH CARE FACILITY: Primary | ICD-10-CM

## 2024-03-08 DIAGNOSIS — Z30.432 ENCOUNTER FOR REMOVAL OF INTRAUTERINE CONTRACEPTIVE DEVICE: ICD-10-CM

## 2024-03-08 DIAGNOSIS — F33.1 MAJOR DEPRESSIVE DISORDER, RECURRENT EPISODE, MODERATE (H): ICD-10-CM

## 2024-03-08 PROBLEM — R50.9 LOW GRADE FEVER: Status: RESOLVED | Noted: 2023-09-29 | Resolved: 2024-03-08

## 2024-03-08 PROCEDURE — 99213 OFFICE O/P EST LOW 20 MIN: CPT | Mod: 25 | Performed by: FAMILY MEDICINE

## 2024-03-08 PROCEDURE — 99396 PREV VISIT EST AGE 40-64: CPT | Mod: 25 | Performed by: FAMILY MEDICINE

## 2024-03-08 PROCEDURE — 58301 REMOVE INTRAUTERINE DEVICE: CPT | Performed by: FAMILY MEDICINE

## 2024-03-08 RX ORDER — BUPROPION HYDROCHLORIDE 450 MG/1
450 TABLET, FILM COATED, EXTENDED RELEASE ORAL EVERY MORNING
Qty: 90 TABLET | Refills: 3 | Status: SHIPPED | OUTPATIENT
Start: 2024-03-08 | End: 2024-05-30

## 2024-03-08 RX ORDER — ESCITALOPRAM OXALATE 10 MG/1
15 TABLET ORAL DAILY
Qty: 135 TABLET | Refills: 3 | Status: SHIPPED | OUTPATIENT
Start: 2024-03-08

## 2024-03-08 RX ORDER — METOPROLOL SUCCINATE 25 MG/1
25 TABLET, EXTENDED RELEASE ORAL DAILY
Qty: 90 TABLET | Refills: 3 | Status: SHIPPED | OUTPATIENT
Start: 2024-03-08 | End: 2024-05-30

## 2024-03-08 SDOH — HEALTH STABILITY: PHYSICAL HEALTH: ON AVERAGE, HOW MANY MINUTES DO YOU ENGAGE IN EXERCISE AT THIS LEVEL?: 10 MIN

## 2024-03-08 SDOH — HEALTH STABILITY: PHYSICAL HEALTH: ON AVERAGE, HOW MANY DAYS PER WEEK DO YOU ENGAGE IN MODERATE TO STRENUOUS EXERCISE (LIKE A BRISK WALK)?: 3 DAYS

## 2024-03-08 ASSESSMENT — SOCIAL DETERMINANTS OF HEALTH (SDOH): HOW OFTEN DO YOU GET TOGETHER WITH FRIENDS OR RELATIVES?: TWICE A WEEK

## 2024-03-08 ASSESSMENT — ANXIETY QUESTIONNAIRES
7. FEELING AFRAID AS IF SOMETHING AWFUL MIGHT HAPPEN: NOT AT ALL
3. WORRYING TOO MUCH ABOUT DIFFERENT THINGS: NOT AT ALL
8. IF YOU CHECKED OFF ANY PROBLEMS, HOW DIFFICULT HAVE THESE MADE IT FOR YOU TO DO YOUR WORK, TAKE CARE OF THINGS AT HOME, OR GET ALONG WITH OTHER PEOPLE?: NOT DIFFICULT AT ALL
GAD7 TOTAL SCORE: 0
7. FEELING AFRAID AS IF SOMETHING AWFUL MIGHT HAPPEN: NOT AT ALL
5. BEING SO RESTLESS THAT IT IS HARD TO SIT STILL: NOT AT ALL
GAD7 TOTAL SCORE: 0
6. BECOMING EASILY ANNOYED OR IRRITABLE: NOT AT ALL
1. FEELING NERVOUS, ANXIOUS, OR ON EDGE: NOT AT ALL
2. NOT BEING ABLE TO STOP OR CONTROL WORRYING: NOT AT ALL
GAD7 TOTAL SCORE: 0
IF YOU CHECKED OFF ANY PROBLEMS ON THIS QUESTIONNAIRE, HOW DIFFICULT HAVE THESE PROBLEMS MADE IT FOR YOU TO DO YOUR WORK, TAKE CARE OF THINGS AT HOME, OR GET ALONG WITH OTHER PEOPLE: NOT DIFFICULT AT ALL
4. TROUBLE RELAXING: NOT AT ALL

## 2024-03-08 ASSESSMENT — PAIN SCALES - GENERAL: PAINLEVEL: NO PAIN (0)

## 2024-03-08 NOTE — PROGRESS NOTES
Preventive Care Visit  [facility]  Merlene Paz MD, Family Medicine  Mar 8, 2024    Assessment & Plan     Routine general medical examination at a health care facility    Visit for screening mammogram  - MA SCREENING DIGITAL BILAT - Future  (s+30); Future    Encounter for removal of intrauterine contraceptive device  Did discuss that she could leave the IUD in for up to 8 years.  It has been in for 5 years.  She does get light spotting every month.  She prefers to remove the IUD.  If she has heavier bleeding she could be referred to gynecology.    - REMOVE INTRAUTERINE DEVICE    Benign essential hypertension  Well-controlled.  Refills given.    - metoprolol succinate ER (TOPROL XL) 25 MG 24 hr tablet; Take 1 tablet (25 mg) by mouth daily    Major depressive disorder, recurrent episode, moderate (H)  Mood well-controlled.  She feels she is coping with her mother's death.  She is not interested in dose reduction at this time.  Refills were given.    - escitalopram (LEXAPRO) 10 MG tablet; Take 1.5 tablets (15 mg) by mouth daily  - buPROPion HCl ER, XL, 450 MG TB24; Take 450 mg by mouth every morning    Severe obesity (BMI 35.0-39.9) with comorbidity (H)  Comorbidity includes hypertension.  Weight loss is recommended.      Counseling  Appropriate preventive services were discussed with this patient, including applicable screening as appropriate for fall prevention, nutrition, physical activity, Tobacco-use cessation, weight loss and cognition.  Checklist reviewing preventive services available has been given to the patient.  Reviewed patient's diet, addressing concerns and/or questions.   She is at risk for lack of exercise and has been provided with information to increase physical activity for the benefit of her well-being.   The patient was instructed to see the dentist every 6 months.   She is at risk for psychosocial distress and has been provided with information to reduce risk.          Subjective   Hannah is a 54 year old, presenting for the following:  Physical (Remove IUD)        3/8/2024     9:03 AM   Additional Questions   Roomed by leatha sevilla         3/8/2024     9:03 AM   Patient Reported Additional Medications   Patient reports taking the following new medications black cohash, tumeric with black pepper        Health Care Directive  Patient does not have a Health Care Directive or Living Will: Not discussed    HPI      IUD REMOVAL    Is a pregnancy test required: No.  Was a consent obtained?  Yes    Hannah Krishna is a 54 year old female,, No LMP recorded. (Menstrual status: IUD). who presents today for IUD removal. Her current IUD was placed 2018 ago. She has not had problems with the IUD. She requests removal of the IUD because the IUD effectiveness has     PROCEDURE:    A speculum exam was performed and the cervix was visualized. The IUD string was visualized. Using ring forceps, the string  was grasped and the IUD removed intact.    POST PROCEDURE:    The patient tolerated the procedure well. Patient was discharged in stable condition.    Call if bleeding, pain or fever occur.        3/8/2024   General Health   How would you rate your overall physical health? Good   Feel stress (tense, anxious, or unable to sleep) Only a little   (!) STRESS CONCERN      3/8/2024   Nutrition   Three or more servings of calcium each day? Yes   Diet: Regular (no restrictions)   How many servings of fruit and vegetables per day? (!) 0-1   How many sweetened beverages each day? 0-1         3/8/2024   Exercise   Days per week of moderate/strenous exercise 3 days   Average minutes spent exercising at this level 10 min         3/8/2024   Social Factors   Frequency of gathering with friends or relatives Twice a week   Worry food won't last until get money to buy more No   Food not last or not have enough money for food? No   Do you have housing?  Yes   Are you worried about losing your  housing? No   Lack of transportation? No   Unable to get utilities (heat,electricity)? No          No data to display                   3/8/2024   Dental   Dentist two times every year? (!) NO         3/8/2024   TB Screening   Were you born outside of [place]?  No       Today's PHQ-9 Score:       3/8/2024     8:52 AM   PHQ-9 SCORE   PHQ-9 Total Score MyChart 0   PHQ-9 Total Score 0         3/8/2024   Substance Use   Alcohol more than 3/day or more than 7/wk No   Do you use any other substances recreationally? (!) ALCOHOL     Social History     Tobacco Use    Smoking status: Former     Packs/day: 0.10     Years: 18.00     Additional pack years: 0.00     Total pack years: 1.80     Types: Cigarettes     Quit date: 2014     Years since quittin.3     Passive exposure: Never    Smokeless tobacco: Never    Tobacco comments:     since    Vaping Use    Vaping Use: Never used   Substance Use Topics    Alcohol use: Yes     Comment: Occasional    Drug use: No             3/8/2024   Breast Cancer Screening   Family history of breast, colon, or ovarian cancer? No / Unknown      Mammogram Screening - Mammogram every 1-2 years updated in Health Maintenance based on mutual decision making        3/8/2024   STI Screening   New sexual partner(s) since last STI/HIV test? No     History of abnormal Pap smear: NO - age 30-65 PAP every 5 years with negative HPV co-testing recommended        Latest Ref Rng & Units 2022     3:04 PM 2016    12:45 PM 2016    12:00 AM   PAP / HPV   PAP  Negative for Intraepithelial Lesion or Malignancy (NILM)      PAP (Historical)    NIL    HPV 16 DNA Negative Negative  Negative     HPV 18 DNA Negative Negative  Negative     Other HR HPV Negative Negative  Negative       ASCVD Risk   The 10-year ASCVD risk score (Charlie WALLS, et al., 2019) is: 3%    Values used to calculate the score:      Age: 54 years      Sex: Female      Is Non- : No      Diabetic:  "No      Tobacco smoker: No      Systolic Blood Pressure: 120 mmHg      Is BP treated: Yes      HDL Cholesterol: 42 mg/dL      Total Cholesterol: 230 mg/dL           Reviewed and updated as needed this visit by Provider   Tobacco  Allergies  Meds  Problems  Med Hx  Surg Hx  Fam Hx                   Objective    Exam  /70 (BP Location: Left arm, Patient Position: Sitting, Cuff Size: Adult Large)   Pulse 61   Temp 97.1  F (36.2  C) (Temporal)   Resp 12   Ht 1.6 m (5' 3\")   Wt 98.9 kg (218 lb)   SpO2 97%   BMI 38.62 kg/m     Estimated body mass index is 38.62 kg/m  as calculated from the following:    Height as of this encounter: 1.6 m (5' 3\").    Weight as of this encounter: 98.9 kg (218 lb).    Physical Exam  Constitutional:       General: She is not in acute distress.     Appearance: She is well-developed.   HENT:      Right Ear: Tympanic membrane and external ear normal.      Left Ear: Tympanic membrane and external ear normal.      Nose: Nose normal.   Eyes:      General:         Right eye: No discharge.         Left eye: No discharge.      Conjunctiva/sclera: Conjunctivae normal.   Neck:      Thyroid: No thyroid mass.   Cardiovascular:      Rate and Rhythm: Normal rate and regular rhythm.      Heart sounds: Normal heart sounds, S1 normal and S2 normal. No murmur heard.  Pulmonary:      Effort: Pulmonary effort is normal. No respiratory distress.      Breath sounds: Normal breath sounds. No wheezing or rales.   Abdominal:      General: Bowel sounds are normal.      Palpations: Abdomen is soft. There is no mass.      Tenderness: There is no abdominal tenderness.   Genitourinary:     General: Normal vulva.      Vagina: Normal.      Cervix: Normal.   Musculoskeletal:         General: Normal range of motion.      Cervical back: Neck supple.   Lymphadenopathy:      Cervical: No cervical adenopathy.   Skin:     General: Skin is warm and dry.      Findings: No rash.   Neurological:      Mental Status: " She is alert and oriented to person, place, and time.   Psychiatric:         Mood and Affect: Mood normal.         Behavior: Behavior normal.         Thought Content: Thought content normal.         Judgment: Judgment normal.           Signed Electronically by: Merlene Paz MD  I

## 2024-03-08 NOTE — PATIENT INSTRUCTIONS
Preventive Care Advice   This is general advice given by our system to help you stay healthy. However, your care team may have specific advice just for you. Please talk to your care team about your preventive care needs.  Nutrition  Eat 5 or more servings of fruits and vegetables each day.  Try wheat bread, brown rice and whole grain pasta (instead of white bread, rice, and pasta).  Get enough calcium and vitamin D. Check the label on foods and aim for 100% of the RDA (recommended daily allowance).  Lifestyle  Exercise at least 150 minutes each week   (30 minutes a day, 5 days a week).  Do muscle strengthening activities 2 days a week. These help control your weight and prevent disease.  No smoking.  Wear sunscreen to prevent skin cancer.  Have a dental exam and cleaning every 6 months.  Yearly exams  See your health care team every year to talk about:  Any changes in your health.  Any medicines your care team has prescribed.  Preventive care, family planning, and ways to prevent chronic diseases.  Shots (vaccines)   HPV shots (up to age 26), if you've never had them before.  Hepatitis B shots (up to age 59), if you've never had them before.  COVID-19 shot: Get this shot when it's due.  Flu shot: Get a flu shot every year.  Tetanus shot: Get a tetanus shot every 10 years.  Pneumococcal, hepatitis A, and RSV shots: Ask your care team if you need these based on your risk.  Shingles shot (for age 50 and up).  General health tests  Diabetes screening:  Starting at age 35, Get screened for diabetes at least every 3 years.  If you are younger than age 35, ask your care team if you should be screened for diabetes.  Cholesterol test: At age 39, start having a cholesterol test every 5 years, or more often if advised.  Bone density scan (DEXA): At age 50, ask your care team if you should have this scan for osteoporosis (brittle bones).  Hepatitis C: Get tested at least once in your life.  STIs (sexually transmitted  infections)  Before age 24: Ask your care team if you should be screened for STIs.  After age 24: Get screened for STIs if you're at risk. You are at risk for STIs (including HIV) if:  You are sexually active with more than one person.  You don't use condoms every time.  You or a partner was diagnosed with a sexually transmitted infection.  If you are at risk for HIV, ask about PrEP medicine to prevent HIV.  Get tested for HIV at least once in your life, whether you are at risk for HIV or not.  Cancer screening tests  Cervical cancer screening: If you have a cervix, begin getting regular cervical cancer screening tests at age 21. Most people who have regular screenings with normal results can stop after age 65. Talk about this with your provider.  Breast cancer scan (mammogram): If you've ever had breasts, begin having regular mammograms starting at age 40. This is a scan to check for breast cancer.  Colon cancer screening: It is important to start screening for colon cancer at age 45.  Have a colonoscopy test every 10 years (or more often if you're at risk) Or, ask your provider about stool tests like a FIT test every year or Cologuard test every 3 years.  To learn more about your testing options, visit: https://[email or web address]/603091.pdf.  For help making a decision, visit: https://bit.ly/bt91215.  Prostate cancer screening test: If you have a prostate and are age 55 to 69, ask your provider if you would benefit from a yearly prostate cancer screening test.  Lung cancer screening: If you are a current or former smoker age 50 to 80, ask your care team if ongoing lung cancer screenings are right for you.  For informational purposes only. Not to replace the advice of your health care provider. Copyright   2023 [employer]. All rights reserved. Clinically reviewed by the [facility]. [employer] 822322 - REV 01/24.    Learning About Stress  What is stress?     Stress is your  body's response to a hard situation. Your body can have a physical, emotional, or mental response. Stress is a fact of life for most people, and it affects everyone differently. What causes stress for you may not be stressful for someone else.  A lot of things can cause stress. You may feel stress when you go on a job interview, take a test, or run a race. This kind of short-term stress is normal and even useful. It can help you if you need to work hard or react quickly. For example, stress can help you finish an important job on time.  Long-term stress is caused by ongoing stressful situations or events. Examples of long-term stress include long-term health problems, ongoing problems at work, or conflicts in your family. Long-term stress can harm your health.  How does stress affect your health?  When you are stressed, your body responds as though you are in danger. It makes hormones that speed up your heart, make you breathe faster, and give you a burst of energy. This is called the fight-or-flight stress response. If the stress is over quickly, your body goes back to normal and no harm is done.  But if stress happens too often or lasts too long, it can have bad effects. Long-term stress can make you more likely to get sick, and it can make symptoms of some diseases worse. If you tense up when you are stressed, you may develop neck, shoulder, or low back pain. Stress is linked to high blood pressure and heart disease.  Stress also harms your emotional health. It can make you alvarenga, tense, or depressed. Your relationships may suffer, and you may not do well at work or school.  What can you do to manage stress?  You can try these things to help manage stress:   Do something active. Exercise or activity can help reduce stress. Walking is a great way to get started. Even everyday activities such as housecleaning or yard work can help.  Try yoga or joe chi. These techniques combine exercise and meditation. You may need  some training at first to learn them.  Do something you enjoy. For example, listen to music or go to a movie. Practice your hobby or do volunteer work.  Meditate. This can help you relax, because you are not worrying about what happened before or what may happen in the future.  Do guided imagery. Imagine yourself in any setting that helps you feel calm. You can use online videos, books, or a teacher to guide you.  Do breathing exercises. For example:  From a standing position, bend forward from the waist with your knees slightly bent. Let your arms dangle close to the floor.  Breathe in slowly and deeply as you return to a standing position. Roll up slowly and lift your head last.  Hold your breath for just a few seconds in the standing position.  Breathe out slowly and bend forward from the waist.  Let your feelings out. Talk, laugh, cry, and express anger when you need to. Talking with supportive friends or family, a counselor, or a machelle leader about your feelings is a healthy way to relieve stress. Avoid discussing your feelings with people who make you feel worse.  Write. It may help to write about things that are bothering you. This helps you find out how much stress you feel and what is causing it. When you know this, you can find better ways to cope.  What can you do to prevent stress?  You might try some of these things to help prevent stress:  Manage your time. This helps you find time to do the things you want and need to do.  Get enough sleep. Your body recovers from the stresses of the day while you are sleeping.  Get support. Your family, friends, and community can make a difference in how you experience stress.  Limit your news feed. Avoid or limit time on social media or news that may make you feel stressed.  Do something active. Exercise or activity can help reduce stress. Walking is a great way to get started.  Where can you learn more?  Go to https://www.healthwise.net/patiented  Enter N032 in the  "search box to learn more about \"Learning About Stress.\"  Current as of: February 26, 2023               Content Version: 13.8    7866-8714 [employer].   Care instructions adapted under license by your healthcare professional. If you have questions about a medical condition or this instruction, always ask your healthcare professional. [employer] disclaims any warranty or liability for your use of this information.      Substance Use Disorder: Care Instructions  Overview     You can improve your life and health by stopping your use of alcohol or drugs. When you don't drink or use drugs, you may feel and sleep better. You may get along better with your family, friends, and coworkers. There are medicines and programs that can help with substance use disorder.  How can you care for yourself at home?  Here are some ways to help you stay sober and prevent relapse.  If you have been given medicine to help keep you sober or reduce your cravings, be sure to take it exactly as prescribed.  Talk to your doctor about programs that can help you stop using drugs or drinking alcohol.  Do not keep alcohol or drugs in your home.  Plan ahead. Think about what you'll say if other people ask you to drink or use drugs. Try not to spend time with people who drink or use drugs.  Use the time and money spent on drinking or drugs to do something that's important to you.  Preventing a relapse  Have a plan to deal with relapse. Learn to recognize changes in your thinking that lead you to drink or use drugs. Get help before you start to drink or use drugs again.  Try to stay away from situations, friends, or places that may lead you to drink or use drugs.  If you feel the need to drink alcohol or use drugs again, seek help right away. Call a trusted friend or family member. Some people get support from organizations such as Narcotics Anonymous or [employer] or from treatment facilities.  If you relapse, get help " as soon as you can. Some people make a plan with another person that outlines what they want that person to do for them if they relapse. The plan usually includes how to handle the relapse and who to notify in case of relapse.  Don't give up. Remember that a relapse doesn't mean that you have failed. Use the experience to learn the triggers that lead you to drink or use drugs. Then quit again. Recovery is a lifelong process. Many people have several relapses before they are able to quit for good.  Follow-up care is a key part of your treatment and safety. Be sure to make and go to all appointments, and call your doctor if you are having problems. It's also a good idea to know your test results and keep a list of the medicines you take.  When should you call for help?   Call 911  anytime you think you may need emergency care. For example, call if you or someone else:    Has overdosed or has withdrawal signs. Be sure to tell the emergency workers that you are or someone else is using or trying to quit using drugs. Overdose or withdrawal signs may include:  Losing consciousness.  Seizure.  Seeing or hearing things that aren't there (hallucinations).     Is thinking or talking about suicide or harming others.   Where to get help 24 hours a day, 7 days a week   If you or someone you know talks about suicide, self-harm, a mental health crisis, a substance use crisis, or any other kind of emotional distress, get help right away. You can:    Call the Suicide and Crisis Lifeline at 985.     Call 4-623-709-TALK (1-951.297.1650).     Text HOME to 430158 to access the Crisis Text Line.   Consider saving these numbers in your phone.  Go to [employer].org for more information or to chat online.  Call your doctor now or seek immediate medical care if:    You are having withdrawal symptoms. These may include nausea or vomiting, sweating, shakiness, and anxiety.   Watch closely for changes in your health, and be sure to contact  "your doctor if:    You have a relapse.     You need more help or support to stop.   Where can you learn more?  Go to https://www.[employer]..net/patiented  Enter H573 in the search box to learn more about \"Substance Use Disorder: Care Instructions.\"  Current as of: March 21, 2023               Content Version: 13.8    0388-2754 [employer].   Care instructions adapted under license by your healthcare professional. If you have questions about a medical condition or this instruction, always ask your healthcare professional. Healthwise, [employer] disclaims any warranty or liability for your use of this information.      "

## 2024-04-05 ENCOUNTER — DOCUMENTATION ONLY (OUTPATIENT)
Dept: NEUROSURGERY | Facility: CLINIC | Age: 54
End: 2024-04-05
Payer: COMMERCIAL

## 2024-04-05 NOTE — PROGRESS NOTES
Faxed [facility] Claim April 5, 2024 to fax number  [phone] and HIM    Right Fax confirmed at 9:20 AM    Juan Dobbs

## 2024-05-29 ASSESSMENT — ANXIETY QUESTIONNAIRES
GAD7 TOTAL SCORE: 1
5. BEING SO RESTLESS THAT IT IS HARD TO SIT STILL: NOT AT ALL
6. BECOMING EASILY ANNOYED OR IRRITABLE: SEVERAL DAYS
8. IF YOU CHECKED OFF ANY PROBLEMS, HOW DIFFICULT HAVE THESE MADE IT FOR YOU TO DO YOUR WORK, TAKE CARE OF THINGS AT HOME, OR GET ALONG WITH OTHER PEOPLE?: SOMEWHAT DIFFICULT
4. TROUBLE RELAXING: NOT AT ALL
GAD7 TOTAL SCORE: 1
7. FEELING AFRAID AS IF SOMETHING AWFUL MIGHT HAPPEN: NOT AT ALL
1. FEELING NERVOUS, ANXIOUS, OR ON EDGE: NOT AT ALL
3. WORRYING TOO MUCH ABOUT DIFFERENT THINGS: NOT AT ALL
7. FEELING AFRAID AS IF SOMETHING AWFUL MIGHT HAPPEN: NOT AT ALL
2. NOT BEING ABLE TO STOP OR CONTROL WORRYING: NOT AT ALL
GAD7 TOTAL SCORE: 1
IF YOU CHECKED OFF ANY PROBLEMS ON THIS QUESTIONNAIRE, HOW DIFFICULT HAVE THESE PROBLEMS MADE IT FOR YOU TO DO YOUR WORK, TAKE CARE OF THINGS AT HOME, OR GET ALONG WITH OTHER PEOPLE: SOMEWHAT DIFFICULT

## 2024-05-30 ENCOUNTER — OFFICE VISIT (OUTPATIENT)
Dept: FAMILY MEDICINE | Facility: OTHER | Age: 54
End: 2024-05-30
Payer: COMMERCIAL

## 2024-05-30 VITALS
OXYGEN SATURATION: 100 % | BODY MASS INDEX: 38.62 KG/M2 | DIASTOLIC BLOOD PRESSURE: 78 MMHG | SYSTOLIC BLOOD PRESSURE: 116 MMHG | HEIGHT: 63 IN | TEMPERATURE: 97.8 F | WEIGHT: 218 LBS | RESPIRATION RATE: 18 BRPM | HEART RATE: 75 BPM

## 2024-05-30 DIAGNOSIS — Z12.31 VISIT FOR SCREENING MAMMOGRAM: ICD-10-CM

## 2024-05-30 DIAGNOSIS — E66.01 SEVERE OBESITY (BMI 35.0-39.9) WITH COMORBIDITY (H): ICD-10-CM

## 2024-05-30 DIAGNOSIS — L30.9 DERMATITIS: ICD-10-CM

## 2024-05-30 DIAGNOSIS — L50.9 HIVES: Primary | ICD-10-CM

## 2024-05-30 DIAGNOSIS — I10 BENIGN ESSENTIAL HYPERTENSION: ICD-10-CM

## 2024-05-30 PROCEDURE — 99214 OFFICE O/P EST MOD 30 MIN: CPT | Performed by: FAMILY MEDICINE

## 2024-05-30 RX ORDER — BUPROPION HYDROCHLORIDE 300 MG/1
300 TABLET ORAL EVERY MORNING
COMMUNITY
Start: 2024-04-06

## 2024-05-30 RX ORDER — BUPROPION HYDROCHLORIDE 150 MG/1
150 TABLET ORAL EVERY MORNING
COMMUNITY
Start: 2024-04-30

## 2024-05-30 RX ORDER — TRIAMCINOLONE ACETONIDE 1 MG/G
CREAM TOPICAL 2 TIMES DAILY
Qty: 15 G | Refills: 1 | Status: SHIPPED | OUTPATIENT
Start: 2024-05-30

## 2024-05-30 RX ORDER — METOPROLOL SUCCINATE 25 MG/1
25 TABLET, EXTENDED RELEASE ORAL DAILY
Qty: 90 TABLET | Refills: 3 | Status: SHIPPED | OUTPATIENT
Start: 2024-05-30

## 2024-05-30 ASSESSMENT — PAIN SCALES - GENERAL: PAINLEVEL: NO PAIN (0)

## 2024-05-30 NOTE — PROGRESS NOTES
Assessment & Plan     Hives  I suspect she is having intermittent hives.  Recommend that when she gets it to use some Zyrtec.  She could always try keeping a diary of when it happens to see if she can find any association but did discuss that sometimes it can be very difficult to figure out the etiology of hives.    Dermatitis  One of the picture she showed me had more of a vague reddish macule that was not clearly hives.  She could have some intermittent dermatitis.  Did give her some triamcinolone to use as needed for those.    - triamcinolone (KENALOG) 0.1 % external cream; Apply topically 2 times daily    Benign essential hypertension  Well-controlled.  Refills given.    - metoprolol succinate ER (TOPROL XL) 25 MG 24 hr tablet; Take 1 tablet (25 mg) by mouth daily    Visit for screening mammogram  - MA Screen Bilateral w/Kennedy; Future    Severe obesity (BMI 35.0-39.9) with comorbidity (H)  She is very frustrated with her weight.  She feels she is eating healthy and not eating that much.  We did discuss that diet is the biggest help for weight loss.  She currently is on bupropion.  She is concerned about the cost of some of the newer weight loss medications.  Will refer her to weight loss specialty.    - Adult Comprehensive Weight Management  Referral; Future      Subjective   Hannah is a 54 year old, presenting for the following health issues:  Rash        5/30/2024     8:02 AM   Additional Questions   Roomed by Anjali VACA   Accompanied by n/a         5/30/2024     8:02 AM   Patient Reported Additional Medications   Patient reports taking the following new medications n/a     History of Present Illness       Reason for visit:  Rash on my back  Symptom onset:  More than a month  Symptoms include:  Pink/ Red color, itches sometimes but not to often  Symptom intensity:  Mild  Symptom progression:  Worsening  Had these symptoms before:  No  What makes it worse:  No  What makes it better:  No    She eats 0-1  "servings of fruits and vegetables daily.She consumes 0 sweetened beverage(s) daily.She exercises with enough effort to increase her heart rate 20 to 29 minutes per day.  She exercises with enough effort to increase her heart rate 4 days per week.   She is taking medications regularly.     She has noticed intermittent rash on her back.  She states it is light pink and around her shoulders.  Sometimes it itches.  It tends to come and go without any known association.  She thought it might have been avocados but it still comes when she is not eating them.  She had a friend take pictures of the rash when they saw it.  Did take a look at these pictures and they appear to be hives.    Hypertension Follow-up    Do you check your blood pressure regularly outside of the clinic? No   Are you following a low salt diet? No  Are your blood pressures ever more than 140 on the top number (systolic) OR more   than 90 on the bottom number (diastolic), for example 140/90? No            Objective    /78 (Cuff Size: Adult Large)   Pulse 75   Temp 97.8  F (36.6  C) (Temporal)   Resp 18   Ht 1.6 m (5' 3\")   Wt 98.9 kg (218 lb)   SpO2 100%   BMI 38.62 kg/m    Body mass index is 38.62 kg/m .  Physical Exam   Gen: no apparent distress  Skin: No obvious rash.  Cannot find anything similar to the picture she was showing me.            Signed Electronically by: Merlene Paz MD    "

## 2024-06-12 ENCOUNTER — MYC MEDICAL ADVICE (OUTPATIENT)
Dept: FAMILY MEDICINE | Facility: OTHER | Age: 54
End: 2024-06-12
Payer: COMMERCIAL

## 2024-06-12 NOTE — TELEPHONE ENCOUNTER
Yvonne does not have fax. Unable to send referral to location. Sent myc to see what patient would like

## 2024-08-08 ENCOUNTER — OFFICE VISIT (OUTPATIENT)
Dept: URGENT CARE | Facility: CLINIC | Age: 54
End: 2024-08-08
Payer: COMMERCIAL

## 2024-08-08 VITALS
RESPIRATION RATE: 18 BRPM | WEIGHT: 200.7 LBS | HEART RATE: 77 BPM | SYSTOLIC BLOOD PRESSURE: 140 MMHG | TEMPERATURE: 98.7 F | OXYGEN SATURATION: 98 % | DIASTOLIC BLOOD PRESSURE: 86 MMHG

## 2024-08-08 DIAGNOSIS — U07.1 COVID-19: ICD-10-CM

## 2024-08-08 DIAGNOSIS — J06.9 UPPER RESPIRATORY TRACT INFECTION, UNSPECIFIED TYPE: Primary | ICD-10-CM

## 2024-08-08 PROBLEM — N20.0 NEPHROLITHIASIS: Status: ACTIVE | Noted: 2019-09-12

## 2024-08-08 PROBLEM — M43.22 FUSION OF SPINE OF CERVICAL REGION: Status: ACTIVE | Noted: 2020-01-10

## 2024-08-08 PROBLEM — G89.29 CHRONIC RIGHT-SIDED THORACIC BACK PAIN: Status: ACTIVE | Noted: 2021-01-26

## 2024-08-08 PROBLEM — M54.6 CHRONIC RIGHT-SIDED THORACIC BACK PAIN: Status: ACTIVE | Noted: 2021-01-26

## 2024-08-08 LAB — SARS-COV-2 RNA RESP QL NAA+PROBE: POSITIVE

## 2024-08-08 PROCEDURE — 99203 OFFICE O/P NEW LOW 30 MIN: CPT | Performed by: PHYSICIAN ASSISTANT

## 2024-08-08 PROCEDURE — 87635 SARS-COV-2 COVID-19 AMP PRB: CPT | Performed by: PHYSICIAN ASSISTANT

## 2024-08-08 RX ORDER — BUPROPION HYDROCHLORIDE 300 MG/1
300 TABLET ORAL DAILY
COMMUNITY

## 2024-08-08 RX ORDER — BUPROPION HYDROCHLORIDE 150 MG/1
150 TABLET ORAL DAILY
COMMUNITY

## 2024-08-08 RX ORDER — ESCITALOPRAM OXALATE 10 MG/1
TABLET ORAL
COMMUNITY

## 2024-08-08 RX ORDER — METOPROLOL SUCCINATE 25 MG/1
25 TABLET, EXTENDED RELEASE ORAL DAILY
COMMUNITY
Start: 2024-05-30

## 2024-08-08 RX ORDER — NIRMATRELVIR AND RITONAVIR 300-100 MG
3 KIT ORAL 2 TIMES DAILY
Qty: 30 TABLET | Refills: 0 | Status: SHIPPED | OUTPATIENT
Start: 2024-08-08 | End: 2024-08-13

## 2024-08-08 RX ORDER — ALBUTEROL SULFATE 90 UG/1
2 INHALANT RESPIRATORY (INHALATION) EVERY 4 HOURS PRN
COMMUNITY
Start: 2023-11-25

## 2024-08-08 RX ORDER — NEOMYCIN/BACITRACIN/POLYMYXINB 3.5-400-5K
OINTMENT (GRAM) TOPICAL
COMMUNITY

## 2024-08-08 RX ORDER — LANOLIN ALCOHOL/MO/W.PET/CERES
50 CREAM (GRAM) TOPICAL DAILY
COMMUNITY

## 2024-08-08 ASSESSMENT — ENCOUNTER SYMPTOMS
COLOR CHANGE: 0
HEMATURIA: 0
FEVER: 1
MYALGIAS: 1
ARTHRALGIAS: 0
FATIGUE: 1
SHORTNESS OF BREATH: 0
SEIZURES: 0
SORE THROAT: 1
PALPITATIONS: 0
DYSURIA: 0
ABDOMINAL PAIN: 0
EYE PAIN: 0
BACK PAIN: 0
COUGH: 1
VOMITING: 0
CHILLS: 0

## 2024-08-08 ASSESSMENT — PAIN SCALES - GENERAL: PAINLEVEL: 2

## 2024-08-08 NOTE — PATIENT INSTRUCTIONS
Quarantine for COVID-19 and other febrile illnesses is until you are fever free for 24 hours without the use of Tylenol or ibuprofen.  Viral illnesses can last 7 to 10 days.  If you are improving but still have symptoms you should wear a mask when you are out in public    You can alternate Tylenol and Motrin every 4 hours for fever, headache and bodyaches.  Maximum dose in a 24-hour period of Tylenol is 3000 mg.  Maximum dose of ibuprofen is 600 mg every 6 hours.  Best alternating doses is 500 mg of Tylenol alternating with 600 mg of ibuprofen every 4 hours.    Is important to stay well-hydrated.    You can use over-the-counter Mucinex to help thin the nasal mucus.  You will have more drainage but it will be thinner.  Use over-the-counter Nasacort or Flonase to help decrease nasal congestion, runny nose and to help facilitate the passage of the thick nasal mucus.  You can use Delsym for cough over-the-counter along with cough drops and honey.  It is important to use a humidifier to help with the airway and minimize cough.  Make sure this is in the bedroom.  You can use Chloraseptic spray or Cepacol lozenges for very sore throat.  Behind the counter pseudoephedrine is the most effective decongestant    Bacterial infections usually present for 7 to 10 days after viral illness.  If you are worsening please return for evaluation in that time.    We discussed use of Paxlovid.  We will call in prescription for Paxlovid 3 tablets twice a day for 5 days if you are positive for COVID-19.

## 2024-08-08 NOTE — PROGRESS NOTES
Subjective      Yasmeen Gibson is a 54 y.o. female who presents for upper respiratory symptoms.    History of Present Illness              Patient is on day 3 of upper respiratory symptoms.  She has fatigue, chills, body aches, sore throat, cough.  She is here for the motorcycle [facility].  She did ride her motorcycle here.  Has to ride home a couple days.  Has a lot of nasal congestion also.  Has been taking Sudafed over-the-counter  The following have been reviewed and updated as appropriate in this visit:          No Known Allergies  Current Outpatient Medications   Medication Sig Dispense Refill    cyanocobalamin, vitamin B-12, 1,000 mcg/mL drops Take by mouth      melatonin-pyridoxine HCl, B6, 3-1 mg tablet Take by mouth      escitalopram (LEXAPRO) 10 mg tablet SMARTSIG:Tablet(s) By Mouth      metoprolol succinate XL (TOPROL-XL) 25 mg 24 hr tablet Take 1 tablet (25 mg total) by mouth daily      pyridoxine, vitamin B6, 50 mg tablet Take 1 tablet (50 mg total) by mouth daily      buPROPion XL (WELLBUTRIN XL) 300 mg 24 hr tablet Take 1 tablet (300 mg total) by mouth daily      buPROPion XL (WELLBUTRIN XL) 150 mg 24 hr tablet Take 1 tablet (150 mg total) by mouth daily      albuterol HFA 90 mcg/actuation inhaler Inhale 2 puffs every 4 (four) hours as needed      SEMAGLUTIDE SUBQ Inject under the skin      nirmatrelvir-ritonavir (Paxlovid) 300 mg (150 mg x 2)-100 mg tablets Take 3 tablets by mouth 2 (two) times a day for 5 days 30 tablet 0     No current facility-administered medications for this visit.     No past medical history on file.  No past surgical history on file.  No family history on file.  Social History     Socioeconomic History    Marital status:      Social Determinants of Health     Tobacco Use: Medium Risk (5/30/2024)    Received from [facility]    Patient History     Smoking Tobacco Use: Former     Smokeless Tobacco Use: Never     Passive Exposure: Never   Financial Resource Strain: Low Risk   (3/8/2024)    Received from [facility]    Financial Resource Strain     Within the past 12 months, have you or your family members you live with been unable to get utilities (heat, electricity) when it was really needed?: No   Food Insecurity: Low Risk  (3/8/2024)    Received from [facility]    Food Insecurity     Within the past 12 months, did you worry that your food would run out before you got money to buy more?: No     Within the past 12 months, did the food you bought just not last and you didn’t have money to get more?: No   Transportation Needs: Low Risk  (3/8/2024)    Received from [facility]    Transportation Needs     Within the past 12 months, has lack of transportation kept you from medical appointments, getting your medicines, non-medical meetings or appointments, work, or from getting things that you need?: No   Physical Activity: Insufficiently Active (3/8/2024)    Received from [facility]    Exercise Vital Sign     Days of Exercise per Week: 3 days     Minutes of Exercise per Session: 10 min   Stress: No Stress Concern Present (3/8/2024)    Received from [facility]    [employer] [place] - Occupational Stress Questionnaire     Feeling of Stress : Only a little   Social Connections: Unknown (3/8/2024)    Received from [facility]    Social Connection and Isolation Panel [NHANES]     Frequency of Social Gatherings with Friends and Family: Twice a week   Depression: Not at risk (5/30/2024)    Received from [facility]    PHQ-2     PHQ-2 Score: 0   Housing Stability: Low Risk  (3/8/2024)    Received from [facility]    Housing Stability     Do you have housing? (Housing is defined as stable permanent housing and does not include staying ouside in a car, in a tent, in an abandoned building, in an overnight shelter, or couch-surfing.): Yes     Are you worried about losing your housing?: No       Review of Systems   Constitutional:  Positive for fatigue and fever. Negative for chills.   HENT:  Positive  for congestion and sore throat. Negative for ear pain.    Eyes:  Negative for pain and visual disturbance.   Respiratory:  Positive for cough. Negative for shortness of breath.    Cardiovascular:  Negative for chest pain and palpitations.   Gastrointestinal:  Negative for abdominal pain and vomiting.   Genitourinary:  Negative for dysuria and hematuria.   Musculoskeletal:  Positive for myalgias. Negative for arthralgias and back pain.   Skin:  Negative for color change and rash.   Neurological:  Negative for seizures and syncope.   All other systems reviewed and are negative.      Objective   /86   Pulse 77   Temp 37.1 °C (98.7 °F)   Resp 18   Wt 91 kg (200 lb 11.2 oz)   SpO2 98%     Physical Exam  Vitals and nursing note reviewed.   Constitutional:       Appearance: Normal appearance.   HENT:      Head: Normocephalic.      Right Ear: Tympanic membrane, ear canal and external ear normal.      Left Ear: Tympanic membrane, ear canal and external ear normal.      Nose: Congestion and rhinorrhea present.      Mouth/Throat:      Pharynx: Posterior oropharyngeal erythema present.   Eyes:      Extraocular Movements: Extraocular movements intact.      Pupils: Pupils are equal, round, and reactive to light.   Cardiovascular:      Rate and Rhythm: Normal rate and regular rhythm.      Heart sounds: Normal heart sounds.   Pulmonary:      Breath sounds: Normal breath sounds.   Neurological:      General: No focal deficit present.      Mental Status: She is alert.         Recent Results (from the past 24 hour(s))   COVID-19 PCR    Collection Time: 08/08/24 12:11 PM    Specimen: Nasopharynx; Swab   Result Value Ref Range    COVID-19 PCR Positive (A) Negative       Assessment/Plan   Assessment/Plan          Will test patient for COVID.  Will give her a call with results.  She was given symptomatic treatment for over-the-counter and understands quarantine guidelines.    Patient's positive COVID-19.  Will prescribe  Paxlovid.  Last GFR was greater than 60.  She is within 3 days of onset of symptoms.     Diagnosis Plan   1. Upper respiratory tract infection, unspecified type  COVID-19 PCR    COVID-19 PCR      2. COVID-19  nirmatrelvir-ritonavir (Paxlovid) 300 mg (150 mg x 2)-100 mg tablets        Patient Instructions   Quarantine for COVID-19 and other febrile illnesses is until you are fever free for 24 hours without the use of Tylenol or ibuprofen.  Viral illnesses can last 7 to 10 days.  If you are improving but still have symptoms you should wear a mask when you are out in public    You can alternate Tylenol and Motrin every 4 hours for fever, headache and bodyaches.  Maximum dose in a 24-hour period of Tylenol is 3000 mg.  Maximum dose of ibuprofen is 600 mg every 6 hours.  Best alternating doses is 500 mg of Tylenol alternating with 600 mg of ibuprofen every 4 hours.    Is important to stay well-hydrated.    You can use over-the-counter Mucinex to help thin the nasal mucus.  You will have more drainage but it will be thinner.  Use over-the-counter Nasacort or Flonase to help decrease nasal congestion, runny nose and to help facilitate the passage of the thick nasal mucus.  You can use Delsym for cough over-the-counter along with cough drops and honey.  It is important to use a humidifier to help with the airway and minimize cough.  Make sure this is in the bedroom.  You can use Chloraseptic spray or Cepacol lozenges for very sore throat.  Behind the counter pseudoephedrine is the most effective decongestant    Bacterial infections usually present for 7 to 10 days after viral illness.  If you are worsening please return for evaluation in that time.    We discussed use of Paxlovid.  We will call in prescription for Paxlovid 3 tablets twice a day for 5 days if you are positive for COVID-19.    NICKI Monroe

## 2024-08-12 ENCOUNTER — NURSE TRIAGE (OUTPATIENT)
Dept: FAMILY MEDICINE | Facility: OTHER | Age: 54
End: 2024-08-12

## 2024-08-12 ENCOUNTER — OFFICE VISIT (OUTPATIENT)
Dept: FAMILY MEDICINE | Facility: OTHER | Age: 54
End: 2024-08-12
Payer: COMMERCIAL

## 2024-08-12 VITALS
BODY MASS INDEX: 34.55 KG/M2 | WEIGHT: 195 LBS | RESPIRATION RATE: 18 BRPM | HEART RATE: 65 BPM | OXYGEN SATURATION: 98 % | DIASTOLIC BLOOD PRESSURE: 82 MMHG | SYSTOLIC BLOOD PRESSURE: 138 MMHG | TEMPERATURE: 98.3 F | HEIGHT: 63 IN

## 2024-08-12 DIAGNOSIS — H92.03 EAR DISCOMFORT, BILATERAL: ICD-10-CM

## 2024-08-12 DIAGNOSIS — U07.1 INFECTION DUE TO 2019 NOVEL CORONAVIRUS: Primary | ICD-10-CM

## 2024-08-12 PROCEDURE — 99213 OFFICE O/P EST LOW 20 MIN: CPT | Performed by: STUDENT IN AN ORGANIZED HEALTH CARE EDUCATION/TRAINING PROGRAM

## 2024-08-12 RX ORDER — NIRMATRELVIR AND RITONAVIR 300-100 MG
3 KIT ORAL
COMMUNITY
Start: 2024-08-08 | End: 2024-08-14

## 2024-08-12 ASSESSMENT — PAIN SCALES - GENERAL: PAINLEVEL: NO PAIN (0)

## 2024-08-12 NOTE — TELEPHONE ENCOUNTER
Nurse Triage SBAR    Is this a 2nd Level Triage? NO    Situation: patient calling with ear drainage.    Background: COVID started 8/6 tested 8/8 started paxlovid. She beata been congested, headache, body aches and ear pain. Right ear pain for a few days now she has yellow/green drainage coming out of her ear.    Assessment: afebrile. Yellow/green drainage out of ears. headache    Protocol Recommended Disposition:   See in Office Today    Recommendation: patient should be seen. Assisted patient in scheduling an appt for today in clinic. Reviewed red flag symptoms and when to be concerned. Patient expressed understanding.         Does the patient meet one of the following criteria for ADS visit consideration? No    Reason for Disposition   Yellow or green discharge    Additional Information   Negative: Clear or white discharge and swimmer's ear suspected (e.g., recent swimming, ear pain occurs or increases when the earlobe is pulled up and down)   Negative: Bloody ear discharge and after an ear injury   Negative: Earwax is main concern   Negative: Bloody or clear ear discharge and after a head or face injury   Negative: Unexplained bleeding and lasts > 10 minutes or large amount  (Exception: If a few drops of blood, continue with triage.)   Negative: Fever > 103 F (39.4 C)   Negative: Patient sounds very sick or weak to the triager   Negative: Foul-smelling discharge   Negative: Ear pain   Negative: Fever > 100.4 F (38.0 C)   Negative: Cloudy - white discharge    Protocols used: Ear - [id number]-KC

## 2024-08-12 NOTE — LETTER
August 12, 2024      Hannah Krishna  [address] [address] [place] [phone]        To Whom It May Concern:    Hannah Krishna was seen in our clinic. She may return to work on 8/17/24 without restrictions.      Sincerely,        LILO MELGAR MD

## 2024-08-12 NOTE — PROGRESS NOTES
"  Assessment & Plan     Infection due to 2019 novel coronavirus  Ear discomfort, bilateral  Patient with known infection of COVID-19, diagnosed 4 days ago, symptoms started 6 days ago, symptoms have only subtly improved.  Currently on Paxlovid.  I would encourage her to isolate for total of 10 days from her 8/6 symptom onset.  She has been taking over-the-counter and symptomatic measures which I encouraged her to do as well as finish out her Paxlovid prescription.  Continue to hydrate.  In regards to her ears, she does have some bilateral clear/serous drainage, noninfection appearing, no concerns for otitis media at this point in time.  Slightly erythematous TM.  Overall do not think it is bacterial in origin, related to recurrent COVID-19.  Worrisome signs and symptoms for otitis media as well as complications of COVID-19 were discussed and she understands for more urgent/emergent follow-up if needed          Subjective   Hannah is a 54 year old, presenting for the following health issues:  URI      8/12/2024     3:06 PM   Additional Questions   Roomed by cynthia   Accompanied by          8/12/2024     3:06 PM   Patient Reported Additional Medications   Patient reports taking the following new medications semigutide     History of Present Illness       Reason for visit:  Covid    She eats 0-1 servings of fruits and vegetables daily.She consumes 0 sweetened beverage(s) daily.She exercises with enough effort to increase her heart rate 30 to 60 minutes per day.  She exercises with enough effort to increase her heart rate 4 days per week.   She is taking medications regularly.             Review of Systems  Constitutional, HEENT, cardiovascular, pulmonary, gi and gu systems are negative, except as otherwise noted.      Objective    /82   Pulse 65   Temp 98.3  F (36.8  C) (Oral)   Resp 18   Ht 1.6 m (5' 3\")   Wt 88.5 kg (195 lb)   LMP  (LMP Unknown)   SpO2 98%   BMI 34.54 kg/m    Body mass index is " 34.54 kg/m .  Physical Exam  Vitals and nursing note reviewed.   Constitutional:       General: She is not in acute distress.     Appearance: Normal appearance. She is not ill-appearing, toxic-appearing or diaphoretic.   HENT:      Head: Normocephalic and atraumatic.      Right Ear: Ear canal and external ear normal. There is no impacted cerumen.      Left Ear: Ear canal and external ear normal. There is no impacted cerumen.      Ears:      Comments: Slightly erythematous TM bilaterally, clear/serous fluid within the ear canals bilaterally     Nose: Nose normal. No congestion or rhinorrhea.      Mouth/Throat:      Mouth: Mucous membranes are moist.      Pharynx: Oropharynx is clear. No oropharyngeal exudate or posterior oropharyngeal erythema.   Eyes:      General:         Right eye: No discharge.         Left eye: No discharge.      Extraocular Movements: Extraocular movements intact.      Conjunctiva/sclera: Conjunctivae normal.      Pupils: Pupils are equal, round, and reactive to light.   Cardiovascular:      Rate and Rhythm: Normal rate and regular rhythm.      Heart sounds: No murmur heard.  Pulmonary:      Effort: Pulmonary effort is normal. No respiratory distress.      Breath sounds: Normal breath sounds.   Musculoskeletal:         General: Normal range of motion.      Cervical back: Normal range of motion.   Lymphadenopathy:      Cervical: No cervical adenopathy.   Neurological:      Mental Status: She is alert.   Psychiatric:         Mood and Affect: Mood normal.         Behavior: Behavior normal.         Thought Content: Thought content normal.            Signed Electronically by: LILO MELGAR MD

## 2024-08-12 NOTE — LETTER
August 12, 2024      Hannah Krishna  [address] [address] [place] [id number]        To Whom It May Concern:    Hannah Krishna was seen in our clinic. She may return to work on 8/19/24 without restrictions.      Sincerely,        LILO MELGAR MD

## 2024-08-16 ENCOUNTER — NURSE TRIAGE (OUTPATIENT)
Dept: FAMILY MEDICINE | Facility: OTHER | Age: 54
End: 2024-08-16

## 2024-08-16 ENCOUNTER — ALLIED HEALTH/NURSE VISIT (OUTPATIENT)
Dept: FAMILY MEDICINE | Facility: OTHER | Age: 54
End: 2024-08-16
Payer: COMMERCIAL

## 2024-08-16 ENCOUNTER — LAB (OUTPATIENT)
Dept: LAB | Facility: OTHER | Age: 54
End: 2024-08-16
Attending: STUDENT IN AN ORGANIZED HEALTH CARE EDUCATION/TRAINING PROGRAM
Payer: COMMERCIAL

## 2024-08-16 ENCOUNTER — E-VISIT (OUTPATIENT)
Dept: FAMILY MEDICINE | Facility: OTHER | Age: 54
End: 2024-08-16
Payer: COMMERCIAL

## 2024-08-16 DIAGNOSIS — J06.9 VIRAL URI WITH COUGH: ICD-10-CM

## 2024-08-16 DIAGNOSIS — Z78.9 TRIAGE ASSESSMENT CLASS 3, NONURGENT: Primary | ICD-10-CM

## 2024-08-16 DIAGNOSIS — Z13.220 SCREENING FOR HYPERLIPIDEMIA: Primary | ICD-10-CM

## 2024-08-16 DIAGNOSIS — J06.9 VIRAL URI: Primary | ICD-10-CM

## 2024-08-16 LAB
CHOLEST SERPL-MCNC: 200 MG/DL
FASTING STATUS PATIENT QL REPORTED: ABNORMAL
HDLC SERPL-MCNC: 37 MG/DL
LDLC SERPL CALC-MCNC: 127 MG/DL
NONHDLC SERPL-MCNC: 163 MG/DL
TRIGL SERPL-MCNC: 181 MG/DL

## 2024-08-16 PROCEDURE — 99421 OL DIG E/M SVC 5-10 MIN: CPT | Performed by: STUDENT IN AN ORGANIZED HEALTH CARE EDUCATION/TRAINING PROGRAM

## 2024-08-16 PROCEDURE — 36415 COLL VENOUS BLD VENIPUNCTURE: CPT

## 2024-08-16 PROCEDURE — 87635 SARS-COV-2 COVID-19 AMP PRB: CPT | Performed by: STUDENT IN AN ORGANIZED HEALTH CARE EDUCATION/TRAINING PROGRAM

## 2024-08-16 PROCEDURE — 99207 PR NO CHARGE NURSE ONLY: CPT

## 2024-08-16 PROCEDURE — 80061 LIPID PANEL: CPT

## 2024-08-16 NOTE — TELEPHONE ENCOUNTER
Nurse Triage SBAR    Is this a 2nd Level Triage? NO    Situation: Patient complaining of continuing ear congestion and balance issues due to covid.     Background: patient diagnosed with covid 8/8, Sx started 8/6. Patient was seen 8/12 with Dr. Evans with the same symptoms. Also submitted an evisit with him today 8/16.     Assessment: Patient stated she is overall improving with symptoms, however she noted that ear drainage is resolved, but now ears are more plugged. Patient was visibly unsteady with balance, but was able to walk with minimal issues and expressed that she did not feel like she would fall. She is aware this is from the covid and did note this is improving slowly.     Patient noted that provider on 8/12 thought she may or may not have an ear infection. RN did look in patients ears and noted L eardrum did look red and cloudy. However, RN advised patient that in order to confirm any infection she would need to see a provider.     Protocol Recommended Disposition:   No disposition on file.    Recommendation: Patient felt comfortable waiting to see if symptoms continue to improve, but she is wanting to know if provider can extend note for into next week since symptoms are still present. RN advised I would get a message to provider, but instructed patient to go to  if symptoms worsen at all.      AMEENA Alan, RN     Routed to provider    Reason for Disposition   Ear congestion present > 48 hours   Typical COVID-19 symptoms and lasting less than 3 weeks    Additional Information   Negative: Ear pain is main symptom   Negative: Hearing loss (complete or partial) is main symptom   Negative: Earwax is main concern   Negative: Has nasal allergies and they are acting up   Negative: Earache lasts > 1 hour   Negative: Pus or cloudy discharge from ear canal   Negative: Patient wants to be seen   Negative: SEVERE difficulty breathing (e.g., struggling for each breath, speaks in single words)    Negative: SEVERE weakness (e.g., can't stand or can barely walk) and new-onset or WORSE   Negative: Difficult to awaken or acting confused (e.g., disoriented, slurred speech)   Negative: Bluish (or gray) lips or face now   Negative: Sounds like a life-threatening emergency to the triager    Protocols used: Ear - Congestion-A-OH, Coronavirus (COVID-19) Persisting Symptoms Follow-up Call-A-OH

## 2024-08-16 NOTE — TELEPHONE ENCOUNTER
Provider responded to secure chat and would be able to provide patient with a note on Monday. RN will route to provider's basket to address on Monday.     Patient is informed and aware of this. RN reviewed red flag symptoms with patient and when to see emergency care. They agreed and understood.       AMANUEL AlanN, RN

## 2024-08-16 NOTE — PATIENT INSTRUCTIONS
Hannah,    Thank you for choosing us for your care. I have placed an order for a lab test(s). View your full visit summary for details by clicking on the link below. You can schedule a lab only appointment right here in [facility], or by calling [phone].    You will receive your lab results and next steps via [facility] when the lab results return.    Sincerely,  LILO MELGAR MD

## 2024-08-17 ENCOUNTER — TELEPHONE (OUTPATIENT)
Dept: NURSING | Facility: CLINIC | Age: 54
End: 2024-08-17
Payer: COMMERCIAL

## 2024-08-17 LAB — SARS-COV-2 RNA RESP QL NAA+PROBE: POSITIVE

## 2024-08-17 NOTE — TELEPHONE ENCOUNTER
Patient classified as COVID treatment eligible by Epic high risk algorithm:  Yes    Coronavirus (COVID-19) Notification    Reason for call  Notify of POSITIVE COVID-19 lab result, assess symptoms,  review [facility] recommendations    Lab Result   Lab test for 2019-nCoV rRt-PCR or SARS-COV-2 PCR  Oropharyngeal AND/OR nasopharyngeal swabs were POSITIVE for 2019-nCoV RNA [OR] SARS-COV-2 RNA (COVID-19) RNA     We have been unable to reach patient by phone at this time to notify of their Positive COVID-19 result.    Left voicemail message requesting a call back to [phone] [facility] for results.        A Positive COVID-19 letter will be sent via [facility] or the mail.    Kassidy Whitehead

## 2024-08-19 NOTE — TELEPHONE ENCOUNTER
Discussed this with the patient today when she walked into clinic, work note no longer needed, no antibiotics needed at this point in time

## 2024-10-24 ENCOUNTER — LAB REQUISITION (OUTPATIENT)
Dept: LAB | Facility: CLINIC | Age: 54
End: 2024-10-24

## 2024-10-24 DIAGNOSIS — N95.1 MENOPAUSAL AND FEMALE CLIMACTERIC STATES: ICD-10-CM

## 2024-10-24 LAB
ERYTHROCYTE [DISTWIDTH] IN BLOOD BY AUTOMATED COUNT: 12.7 % (ref 10–15)
ESTRADIOL SERPL-MCNC: 92 PG/ML
FSH SERPL IRP2-ACNC: 9.9 MIU/ML
HCT VFR BLD AUTO: 38.5 % (ref 35–47)
HGB BLD-MCNC: 12.3 G/DL (ref 11.7–15.7)
MCH RBC QN AUTO: 28.7 PG (ref 26.5–33)
MCHC RBC AUTO-ENTMCNC: 31.9 G/DL (ref 31.5–36.5)
MCV RBC AUTO: 90 FL (ref 78–100)
PLATELET # BLD AUTO: 315 10E3/UL (ref 150–450)
RBC # BLD AUTO: 4.28 10E6/UL (ref 3.8–5.2)
TSH SERPL DL<=0.005 MIU/L-ACNC: <0.01 UIU/ML (ref 0.3–4.2)
WBC # BLD AUTO: 3.6 10E3/UL (ref 4–11)

## 2024-10-24 PROCEDURE — 82670 ASSAY OF TOTAL ESTRADIOL: CPT | Performed by: OBSTETRICS & GYNECOLOGY

## 2024-10-24 PROCEDURE — 85027 COMPLETE CBC AUTOMATED: CPT | Performed by: OBSTETRICS & GYNECOLOGY

## 2024-10-24 PROCEDURE — 84439 ASSAY OF FREE THYROXINE: CPT | Performed by: OBSTETRICS & GYNECOLOGY

## 2024-10-24 PROCEDURE — 84443 ASSAY THYROID STIM HORMONE: CPT | Performed by: OBSTETRICS & GYNECOLOGY

## 2024-10-24 PROCEDURE — 83001 ASSAY OF GONADOTROPIN (FSH): CPT | Performed by: OBSTETRICS & GYNECOLOGY

## 2024-10-25 LAB — T4 FREE SERPL-MCNC: 1.31 NG/DL (ref 0.9–1.7)

## 2024-11-01 ENCOUNTER — NURSE TRIAGE (OUTPATIENT)
Dept: NURSING | Facility: CLINIC | Age: 54
End: 2024-11-01
Payer: COMMERCIAL

## 2024-11-01 NOTE — TELEPHONE ENCOUNTER
Nurse Triage SBAR    Is this a 2nd Level Triage? YES, LICENSED PRACTITIONER REVIEW IS REQUIRED    Situation: low white blood cell count    Background: Patient calling after receiving lab results from an OBGYN appointment and received a call stating she should follow up with PCP about her white blood cell count and is unsure of time frame she should be seen within.  Reports she has an appointment on 11/14/24.     Assessment: low white blood cell count     Protocol Recommended Disposition:   Call PCP Now    Recommendation: Advised patient to be seen at previously scheduled appointment on 11/14/24.      Paged to provider    Provider consult indicated.     Reason for page: low white blood cell count    -Page sent to Dr. Mcdermott by RN at 1723.   -No call back by 1753.  -Call to answering service to have Dr. Mcdermott cell called at [phone].   -Connected via cell with Dr. Mcdermott at [phone].    Provider recommendation: Keep scheduled appointment on 11/14/24.  No need to be seen sooner unless ill or other symptoms arise.     Provider Recommendation Follow Up:   Unable to reach patient/caregiver. Left message to return call to [phone].  Upon return call please notify caller of provider's recommendations.          Does the patient meet one of the following criteria for ADS visit consideration? 16+ years old, with an [facility] PCP     TIP  Providers, please consider if this condition is appropriate for management at one of our Acute and Diagnostic Services sites.     If patient is a good candidate, please use dotphrase <dot>triageresponse and select Refer to ADS to document.    Joy Silverman RN on 11/1/2024 at 5:25 PM    Reason for Disposition   [1] Follow-up call from patient regarding patient's clinical status AND [2] information urgent    Protocols used: PCP Call - No Triage-A-

## 2024-11-14 ENCOUNTER — OFFICE VISIT (OUTPATIENT)
Dept: FAMILY MEDICINE | Facility: OTHER | Age: 54
End: 2024-11-14
Payer: COMMERCIAL

## 2024-11-14 VITALS
HEART RATE: 63 BPM | OXYGEN SATURATION: 99 % | SYSTOLIC BLOOD PRESSURE: 128 MMHG | DIASTOLIC BLOOD PRESSURE: 78 MMHG | BODY MASS INDEX: 32.96 KG/M2 | WEIGHT: 186 LBS | HEIGHT: 63 IN | TEMPERATURE: 97.1 F | RESPIRATION RATE: 17 BRPM

## 2024-11-14 DIAGNOSIS — F41.9 ANXIETY: ICD-10-CM

## 2024-11-14 DIAGNOSIS — I10 BENIGN ESSENTIAL HYPERTENSION: ICD-10-CM

## 2024-11-14 DIAGNOSIS — E05.90 SUBCLINICAL HYPERTHYROIDISM: ICD-10-CM

## 2024-11-14 DIAGNOSIS — D72.819 LEUKOPENIA, UNSPECIFIED TYPE: Primary | ICD-10-CM

## 2024-11-14 PROBLEM — J00 ACUTE RHINITIS: Status: RESOLVED | Noted: 2023-09-29 | Resolved: 2024-11-14

## 2024-11-14 PROBLEM — Z97.5 IUD (INTRAUTERINE DEVICE) IN PLACE: Status: RESOLVED | Noted: 2018-04-09 | Resolved: 2024-11-14

## 2024-11-14 PROBLEM — R06.83 SNORING: Status: RESOLVED | Noted: 2023-09-29 | Resolved: 2024-11-14

## 2024-11-14 LAB
ANION GAP SERPL CALCULATED.3IONS-SCNC: 12 MMOL/L (ref 7–15)
BASOPHILS # BLD AUTO: 0 10E3/UL (ref 0–0.2)
BASOPHILS NFR BLD AUTO: 0 %
BUN SERPL-MCNC: 17.7 MG/DL (ref 6–20)
CALCIUM SERPL-MCNC: 9.2 MG/DL (ref 8.8–10.4)
CHLORIDE SERPL-SCNC: 104 MMOL/L (ref 98–107)
CREAT SERPL-MCNC: 1.03 MG/DL (ref 0.51–0.95)
EGFRCR SERPLBLD CKD-EPI 2021: 65 ML/MIN/1.73M2
EOSINOPHIL # BLD AUTO: 0.1 10E3/UL (ref 0–0.7)
EOSINOPHIL NFR BLD AUTO: 2 %
ERYTHROCYTE [DISTWIDTH] IN BLOOD BY AUTOMATED COUNT: 12.8 % (ref 10–15)
GLUCOSE SERPL-MCNC: 87 MG/DL (ref 70–99)
HCO3 SERPL-SCNC: 22 MMOL/L (ref 22–29)
HCT VFR BLD AUTO: 37.6 % (ref 35–47)
HGB BLD-MCNC: 12.6 G/DL (ref 11.7–15.7)
IMM GRANULOCYTES # BLD: 0 10E3/UL
IMM GRANULOCYTES NFR BLD: 0 %
LYMPHOCYTES # BLD AUTO: 1.6 10E3/UL (ref 0.8–5.3)
LYMPHOCYTES NFR BLD AUTO: 32 %
MCH RBC QN AUTO: 29.5 PG (ref 26.5–33)
MCHC RBC AUTO-ENTMCNC: 33.5 G/DL (ref 31.5–36.5)
MCV RBC AUTO: 88 FL (ref 78–100)
MONOCYTES # BLD AUTO: 0.3 10E3/UL (ref 0–1.3)
MONOCYTES NFR BLD AUTO: 5 %
NEUTROPHILS # BLD AUTO: 3 10E3/UL (ref 1.6–8.3)
NEUTROPHILS NFR BLD AUTO: 61 %
PLATELET # BLD AUTO: 347 10E3/UL (ref 150–450)
POTASSIUM SERPL-SCNC: 4.2 MMOL/L (ref 3.4–5.3)
RBC # BLD AUTO: 4.27 10E6/UL (ref 3.8–5.2)
SODIUM SERPL-SCNC: 138 MMOL/L (ref 135–145)
TSH SERPL DL<=0.005 MIU/L-ACNC: 4.07 UIU/ML (ref 0.3–4.2)
WBC # BLD AUTO: 5 10E3/UL (ref 4–11)

## 2024-11-14 PROCEDURE — 83520 IMMUNOASSAY QUANT NOS NONAB: CPT | Mod: 90 | Performed by: FAMILY MEDICINE

## 2024-11-14 PROCEDURE — 36415 COLL VENOUS BLD VENIPUNCTURE: CPT | Performed by: FAMILY MEDICINE

## 2024-11-14 PROCEDURE — 86376 MICROSOMAL ANTIBODY EACH: CPT | Performed by: FAMILY MEDICINE

## 2024-11-14 PROCEDURE — 84443 ASSAY THYROID STIM HORMONE: CPT | Performed by: FAMILY MEDICINE

## 2024-11-14 PROCEDURE — 99214 OFFICE O/P EST MOD 30 MIN: CPT | Performed by: FAMILY MEDICINE

## 2024-11-14 PROCEDURE — 84481 FREE ASSAY (FT-3): CPT | Performed by: FAMILY MEDICINE

## 2024-11-14 PROCEDURE — 85025 COMPLETE CBC W/AUTO DIFF WBC: CPT | Performed by: FAMILY MEDICINE

## 2024-11-14 PROCEDURE — G2211 COMPLEX E/M VISIT ADD ON: HCPCS | Performed by: FAMILY MEDICINE

## 2024-11-14 PROCEDURE — 80048 BASIC METABOLIC PNL TOTAL CA: CPT | Performed by: FAMILY MEDICINE

## 2024-11-14 PROCEDURE — 99000 SPECIMEN HANDLING OFFICE-LAB: CPT | Performed by: FAMILY MEDICINE

## 2024-11-14 RX ORDER — PROGESTERONE 100 MG/1
CAPSULE ORAL
COMMUNITY
Start: 2024-10-24

## 2024-11-14 RX ORDER — HYDROXYZINE HYDROCHLORIDE 25 MG/1
25 TABLET, FILM COATED ORAL 3 TIMES DAILY PRN
Qty: 90 TABLET | Refills: 0 | Status: SHIPPED | OUTPATIENT
Start: 2024-11-14

## 2024-11-14 RX ORDER — ESTRADIOL 0.05 MG/D
PATCH, EXTENDED RELEASE TRANSDERMAL
COMMUNITY
Start: 2024-10-24

## 2024-11-14 ASSESSMENT — PAIN SCALES - GENERAL: PAINLEVEL_OUTOF10: NO PAIN (0)

## 2024-11-14 NOTE — PROGRESS NOTES
"  Assessment & Plan     Leukopenia, unspecified type  She had a minimally low white blood count of 3.6.  She has not had issues with this in the past.  She does not have recurrent infections.  Will simply recheck her CBC at this time.  If it remains low would then consider hematology consult.    - CBC with platelets and differential    Benign essential hypertension  Well-controlled on metoprolol.  Will recheck basic metabolic panel.    - BASIC METABOLIC PANEL    Subclinical hyperthyroidism  I did place an endocrinology referral but did discuss that this may take several months to get in.  In the meantime I am going to recheck some thyroid labs.  I have asked her to go home and take a picture of the ingredients of her thyroid supplement as I am unclear if she has been taking any exogenous thyroid hormone that could have been contributing.  I agree with her not continuing to take that medication.    - Adult Endocrinology  Referral; Future  - T3, Free  - Thyroid peroxidase antibody  - Thyrotropin Receptor Antibody  - TSH with free T4 reflex    Anxiety  She felt hydroxyzine has been helpful to take as needed.  She has not had this filled in 2 years and would like to have some on hand.    - hydrOXYzine HCl (ATARAX) 25 MG tablet; Take 1 tablet (25 mg) by mouth 3 times daily as needed for anxiety.    BMI  Estimated body mass index is 33.16 kg/m  as calculated from the following:    Height as of this encounter: 1.595 m (5' 2.8\").    Weight as of this encounter: 84.4 kg (186 lb).   Weight management plan: Following elsewhere with the weight loss specialist      Subjective   Hannah is a 54 year old, presenting for the following health issues:  Results        11/14/2024     2:15 PM   Additional Questions   Roomed by sunday   Accompanied by self     History of Present Illness       Reason for visit:  Low wbc  Symptom onset:  1-2 weeks ago   She is taking medications regularly.     Patient saw gynecology a few weeks ago.  " "Her white blood count was low and they told her to follow-up with her PCP.  She has not had this in the past.    She also had a low TSH and a normal T4.  She was told to see endocrinology but a referral was not provided.  When she called her insurance to see where she could go she called the endocrinology clinic who stated that she went to be seen without an referral.    Patient states that she saw a chiropractor and was altering her diet to lose weight.  She states she was able to lose 15 pounds.  They also did a laboratory workup and she has been taking a thyroid supplement, adrenal and cortisol supplement and kidney health supplement.  She is not sure what are in the supplements.  She states her gynecologist told her to stop the thyroid supplement and she did this 3 weeks ago.  Patient is seeing another provider for weight loss and is on semaglutide.  She has been losing weight.  She does complain of hot flashes but states this is longstanding and her gynecologist started her on hormone replacement therapy.  She denies palpitations, anxiety, or diarrhea.  Her weight loss is intentional as she states she has changed her diet and started on semaglutide.      Objective    /78   Pulse 63   Temp 97.1  F (36.2  C) (Temporal)   Resp 17   Ht 1.595 m (5' 2.8\")   Wt 84.4 kg (186 lb)   LMP 11/14/2024 (Exact Date)   SpO2 99%   BMI 33.16 kg/m    Body mass index is 33.16 kg/m .  Physical Exam   Gen: no apparent distress  NECK: no adenopathy, no asymmetry, no masses  Chest: clear to auscultation without wheeze, rale or rhonchi  Cor: regular rate and rhythm without murmur  ABDOMEN: soft, nontender, no masses and bowel sounds normal  Ext: warm and dry without edema  Psych: Alert and oriented times 3; coherent speech, normal   rate and volume, able to articulate logical thoughts, able   to abstract reason, no tangential thoughts, no hallucinations   or delusions  Her affect is neutral          Signed Electronically " by: Merlene Paz MD

## 2024-11-15 LAB
T3FREE SERPL-MCNC: 2 PG/ML (ref 2–4.4)
THYROPEROXIDASE AB SERPL-ACNC: <10 IU/ML

## 2024-11-16 LAB — TSH RECEP AB SER-ACNC: <1.1 IU/L (ref 0–1.75)

## 2024-12-26 ENCOUNTER — PATIENT OUTREACH (OUTPATIENT)
Dept: CARE COORDINATION | Facility: CLINIC | Age: 54
End: 2024-12-26
Payer: COMMERCIAL

## 2025-02-06 ENCOUNTER — PATIENT OUTREACH (OUTPATIENT)
Dept: CARE COORDINATION | Facility: CLINIC | Age: 55
End: 2025-02-06
Payer: COMMERCIAL

## 2025-02-20 ENCOUNTER — PATIENT OUTREACH (OUTPATIENT)
Dept: CARE COORDINATION | Facility: CLINIC | Age: 55
End: 2025-02-20
Payer: COMMERCIAL

## 2025-03-17 ENCOUNTER — OFFICE VISIT (OUTPATIENT)
Dept: FAMILY MEDICINE | Facility: OTHER | Age: 55
End: 2025-03-17
Payer: COMMERCIAL

## 2025-03-17 VITALS
RESPIRATION RATE: 15 BRPM | TEMPERATURE: 97.9 F | SYSTOLIC BLOOD PRESSURE: 128 MMHG | OXYGEN SATURATION: 99 % | BODY MASS INDEX: 35.13 KG/M2 | DIASTOLIC BLOOD PRESSURE: 72 MMHG | WEIGHT: 197 LBS

## 2025-03-17 DIAGNOSIS — R07.0 THROAT PAIN: ICD-10-CM

## 2025-03-17 DIAGNOSIS — R09.89 CHEST CONGESTION: ICD-10-CM

## 2025-03-17 DIAGNOSIS — H65.92 OME (OTITIS MEDIA WITH EFFUSION), LEFT: Primary | ICD-10-CM

## 2025-03-17 DIAGNOSIS — H65.92 FLUID LEVEL BEHIND TYMPANIC MEMBRANE, LEFT: ICD-10-CM

## 2025-03-17 DIAGNOSIS — J34.89 SINUS PAIN: ICD-10-CM

## 2025-03-17 DIAGNOSIS — F33.1 MAJOR DEPRESSIVE DISORDER, RECURRENT EPISODE, MODERATE (H): ICD-10-CM

## 2025-03-17 DIAGNOSIS — R49.0 HOARSE VOICE QUALITY: ICD-10-CM

## 2025-03-17 DIAGNOSIS — H92.09 OTALGIA, UNSPECIFIED LATERALITY: ICD-10-CM

## 2025-03-17 DIAGNOSIS — H72.92 RUPTURED TYMPANIC MEMBRANE, LEFT: ICD-10-CM

## 2025-03-17 PROCEDURE — 3078F DIAST BP <80 MM HG: CPT | Performed by: PHYSICIAN ASSISTANT

## 2025-03-17 PROCEDURE — 3074F SYST BP LT 130 MM HG: CPT | Performed by: PHYSICIAN ASSISTANT

## 2025-03-17 PROCEDURE — 99213 OFFICE O/P EST LOW 20 MIN: CPT | Performed by: PHYSICIAN ASSISTANT

## 2025-03-17 RX ORDER — FLUCONAZOLE 150 MG/1
150 TABLET ORAL
Qty: 3 TABLET | Refills: 0 | Status: SHIPPED | OUTPATIENT
Start: 2025-03-17 | End: 2025-03-24

## 2025-03-17 NOTE — PROGRESS NOTES
Assessment & Plan     OME (otitis media with effusion), left  Ruptured tympanic membrane, left  Fluid level behind tympanic membrane, left  Otalgia, unspecified laterality  Sinus pain  Throat pain  Mucopurulent drainage from the left ear is noted upon exam.  Mild tenderness of the external ear is noted as well.  Treat with medications and give prophylactic for vaginal candidiasis.  Follow-up with primary care provider to resolution in 2 to 3 weeks.  - amoxicillin-clavulanate (AUGMENTIN) 875-125 MG tablet; Take 1 tablet by mouth 2 times daily for 10 days.  - fluconazole (DIFLUCAN) 150 MG tablet; Take 1 tablet (150 mg) by mouth every 3 days for 3 doses.    Hoarse voice quality  Chest congestion  More than likely due to postnasal drip related to the above.    Major depressive disorder, recurrent episode, moderate (H)  Recommended follow-up with her primary care provider no new concerns with respect to this today.    Filemon Young is a 55 year old, presenting for the following health issues:  Sinus Problem      3/17/2025    11:06 AM   Additional Questions   Roomed by bety     Sinus Problem     History of Present Illness       Reason for visit:  Swollen glands in neck, fluid coming out of left ear  Symptom onset:  1-3 days ago  Symptoms include:  Ears hurt also throat  Symptom intensity:  Severe  Symptom progression:  Worsening  Had these symptoms before:  Yes  Has tried/received treatment for these symptoms:  Yes  What makes it worse:  No   She is taking medications regularly.            Review of Systems  Constitutional, HEENT, cardiovascular, pulmonary, GI, , musculoskeletal, neuro, skin, endocrine and psych systems are negative, except as otherwise noted.      Objective    /72 (BP Location: Right arm, Cuff Size: Adult Regular)   Temp 97.9  F (36.6  C) (Temporal)   Resp 15   Wt 89.4 kg (197 lb)   SpO2 99%   BMI 35.13 kg/m    Body mass index is 35.13 kg/m .  Physical Exam   GENERAL: alert and no  distress  EYES: Eyes grossly normal to inspection, PERRL and conjunctivae and sclerae normal  HENT: normal cephalic/atraumatic, right ear: normal: no effusions, no erythema, normal landmarks, left ear: erythematous, mucopurulent effusion, perforation centrally in the tympanic membrane on the left, and purulent drainage in canal, nose and mouth without ulcers or lesions, rhinorrhea clear, oropharynx clear, oral mucous membranes moist, sinuses: maxillary, frontal tenderness on the sides  NECK: bilateral anterior cervical adenopathy, no asymmetry, masses, or scars, and trachea midline and normal to palpation  RESP: lungs clear to auscultation - no rales, rhonchi or wheezes  CV: regular rate and rhythm, normal S1 S2, no S3 or S4, no murmur, click or rub, no peripheral edema  ABDOMEN: soft, nontender, no hepatosplenomegaly, no masses and bowel sounds normal  MS: no gross musculoskeletal defects noted, no edema  PSYCH: affect flat and fatigued  LYMPH: anterior cervical: shotty nodes            Signed Electronically by: Abiel Reyna PA-C

## 2025-03-20 ENCOUNTER — E-VISIT (OUTPATIENT)
Dept: URGENT CARE | Facility: CLINIC | Age: 55
End: 2025-03-20
Payer: COMMERCIAL

## 2025-03-20 DIAGNOSIS — J06.9 VIRAL URI WITH COUGH: Primary | ICD-10-CM

## 2025-03-20 NOTE — PATIENT INSTRUCTIONS
I'm sorry to hear you are not feeling well!   For adults or children over 100 pounds, try Mucinex for congestion, Robitussin DM maximum strength for cough, and tylenol or advil for your fever and/or pain.  For children, try Zarbees for cough and tylenol or advil for their fever and/or discomfort.  Make sure you are using medications like these to help your symptoms. Your immune system is hard at work trying to get you better. The average virus lasts about 7-10 days.  Hope you feel better soon!    Viral Respiratory Infection: Care Instructions  Overview     A viral respiratory infection is an infection of the nose, sinuses, or throat caused by a virus. Colds and the flu are common types of viral respiratory infections.  The symptoms of a viral respiratory infection often start quickly. They include a fever, sore throat, and runny nose. You may also just not feel well. Or you may not want to eat much.  Most viral infections can be treated with home care. This may include drinking lots of fluids and taking over-the-counter pain medicine. You will probably feel better in 4 to 10 days.  Antibiotics are not used to treat a viral infection. Antibiotics don't kill viruses, so they won't help cure a viral illness.  In some cases, a doctor may prescribe antiviral medicine to help your body fight a serious viral infection.  Follow-up care is a key part of your treatment and safety. Be sure to make and go to all appointments, and call your doctor if you are having problems. It's also a good idea to know your test results and keep a list of the medicines you take.  How can you care for yourself at home?  To prevent dehydration, drink plenty of fluids. Choose water and other clear liquids until you feel better. If you have kidney, heart, or liver disease and have to limit fluids, talk with your doctor before you increase the amount of fluids you drink.  Ask your doctor if you can take an over-the-counter pain medicine, such as  acetaminophen (Tylenol), ibuprofen (Advil, Motrin), or naproxen (Aleve). Be safe with medicines. Read and follow all instructions on the label. No one younger than 20 should take aspirin. It has been linked to Reye syndrome, a serious illness.  Be careful when taking over-the-counter cold or flu medicines and Tylenol at the same time. Many of these medicines have acetaminophen, which is Tylenol. Read the labels to make sure that you are not taking more than the recommended dose. Too much acetaminophen (Tylenol) can be harmful.  Get plenty of rest.  Use saline (saltwater) nasal washes to help keep your nasal passages open and wash out mucus and allergens. You can buy saline nose sprays at a grocery store or drugstore. Follow the instructions on the package. Or you can make your own at home. Add 1 teaspoon of non-iodized salt and 1 teaspoon of baking soda to 2 cups of distilled or boiled and cooled water. Fill a squeeze bottle or neti pot with the nasal wash. Then put the tip into your nostril, and lean over the sink. With your mouth open, gently squirt the liquid. Repeat on the other side.  Use a vaporizer or humidifier to add moisture to your bedroom. Follow the instructions for cleaning the machine.  Do not smoke or allow others to smoke around you. If you need help quitting, talk to your doctor about stop-smoking programs and medicines. These can increase your chances of quitting for good.  When should you call for help?   Call 911 anytime you think you may need emergency care. For example, call if:    You have severe trouble breathing.   Call your doctor now or seek immediate medical care if:    You have a new or higher fever.     Your fever lasts more than 48 hours.     You have trouble breathing.     You have a fever with a stiff neck or a severe headache.     You are sensitive to light.     You feel very sleepy or confused.   Watch closely for changes in your health, and be sure to contact your doctor if:     "You do not get better as expected.   Where can you learn more?  Go to https://www.[employer].net/patiented  Enter Q795 in the search box to learn more about \"Viral Respiratory Infection: Care Instructions.\"  Current as of: April 30, 2024  Content Version: 14.4 2024-2025 [employer].   Care instructions adapted under license by your healthcare professional. If you have questions about a medical condition or this instruction, always ask your healthcare professional. [employer] disclaims any warranty or liability for your use of this information.    "

## 2025-03-25 ENCOUNTER — OFFICE VISIT (OUTPATIENT)
Dept: FAMILY MEDICINE | Facility: OTHER | Age: 55
End: 2025-03-25
Payer: COMMERCIAL

## 2025-03-25 VITALS
HEART RATE: 55 BPM | TEMPERATURE: 96.5 F | SYSTOLIC BLOOD PRESSURE: 119 MMHG | WEIGHT: 194.5 LBS | OXYGEN SATURATION: 99 % | HEIGHT: 64 IN | RESPIRATION RATE: 16 BRPM | DIASTOLIC BLOOD PRESSURE: 74 MMHG | BODY MASS INDEX: 33.2 KG/M2

## 2025-03-25 DIAGNOSIS — J01.90 ACUTE SINUSITIS WITH SYMPTOMS > 10 DAYS: Primary | ICD-10-CM

## 2025-03-25 DIAGNOSIS — H72.92 RUPTURED TYMPANIC MEMBRANE, LEFT: ICD-10-CM

## 2025-03-25 PROCEDURE — 99214 OFFICE O/P EST MOD 30 MIN: CPT

## 2025-03-25 PROCEDURE — 3078F DIAST BP <80 MM HG: CPT

## 2025-03-25 PROCEDURE — 3074F SYST BP LT 130 MM HG: CPT

## 2025-03-25 PROCEDURE — 1125F AMNT PAIN NOTED PAIN PRSNT: CPT

## 2025-03-25 RX ORDER — FLUTICASONE PROPIONATE 50 MCG
1 SPRAY, SUSPENSION (ML) NASAL DAILY
Qty: 9.9 ML | Refills: 0 | Status: SHIPPED | OUTPATIENT
Start: 2025-03-25

## 2025-03-25 RX ORDER — DOXYCYCLINE 100 MG/1
100 CAPSULE ORAL 2 TIMES DAILY
Qty: 14 CAPSULE | Refills: 0 | Status: SHIPPED | OUTPATIENT
Start: 2025-03-25 | End: 2025-04-01

## 2025-03-25 ASSESSMENT — PAIN SCALES - GENERAL: PAINLEVEL_OUTOF10: MILD PAIN (2)

## 2025-03-25 NOTE — PATIENT INSTRUCTIONS
Please stop the amoxicillin-clavulanic acid (Augmentin) as symptoms have worsened and we are changing antibiotics.  I have prescribed an antibiotic called doxycycline to treat suspected lingering sinus infection.  Please take as prescribed until course is complete.  Take with food to prevent upset stomach.  Please be aware that acid reflux symptoms may be exacerbated while taking this antibiotic, okay to take omeprazole or famotidine as needed for acid reflux symptoms.  I would recommend eating yogurt or cottage cheese while taking the antibiotic to replenish good gut bacteria.  Please use the previously prescribed fluconazole (Diflucan) if needed for vaginal yeast symptoms.    I would like you to start using a over-the-counter daily allergy medication for further symptom relief.  This will help dry up nasal tissue and ears to help with leaking and congestion.  Common antihistamines include Zyrtec, Claritin, Allegra, or Xyzal.  Generic formulations are adequate.    - Pain Relief: Over-the-counter pain relievers such as acetaminophen (Tylenol) or ibuprofen (Advil, Motrin) can help alleviate headache or facial pain associated with sinusitis.  You can take 1000 mg of tylenol up to three times daily, as long as another provider has not restricted you from taking this type of medication.  You can take 400-600 mg of Ibuprofen up to three times daily with food, as long as another provider has restricted you from taking this type of medication.    - Warm Compresses: Apply warm compresses to your face to help ease sinus pain and pressure.  Simply soak a clean towel in warm water, wring out the excess water, and place it over your face for a few minutes at a time.    - Nasal Saline Irrigation: Use a saline nasal rinse, or nasal spray to help clear out mucus and soothe your nasal passages.  Follow the instructions provided with the saline rinse or spray for proper use. Make sure to use distilled water from the store, not tap  "water.            - Nasal Corticosteroids: I recommend using a nasal corticosteroid spray, such as Flonase or Nasacort, to reduce inflammation in your nasal passages and improve breathing and drainage.  Use the nasal spray once daily (1-2 sprays into each nostril).   It is best to do a saline rinse just prior to using the nasal spray.  Shake and \"prime\" the bottle first making sure a nice spray comes out prior to use.  Aim back into the sinuses, not just straight up your nose. Also aim a little away from the center (septum) of your nose.   Breath in slightly (but not excessively) with each spray. When completed breath out through the mouth  Repeat on the other side.  Wipe of the nozzle with a clean tissue when complete and cover with the manufactures cap.    Store in a room temperature area out of the light.  Don't share sprays with friends and family.    A side effect of most nasal sprays includes nose bleeds.  Be patient as it may take a few days to start working.                     "

## 2025-03-25 NOTE — PROGRESS NOTES
Assessment & Plan     Acute sinusitis with symptoms > 10 days  Patient is a 55-year-old female with hypertension, hyperthyroidism, and anxiety presenting with concerns of ongoing nasal/sinus symptoms for nearly 2 weeks.  Has been treated with Augmentin in the past 2 weeks but symptoms are persisting.  Plan to discontinue Augmentin and changed to doxycycline for broader coverage.  Discussed supportive management including use of oral antihistamines, topical nasal corticosteroid sprays, and sterile saline rinses. Discussed proper use of medication(s) and potential side effects. Follow-up if symptoms fail to improve despite above interventions. Patient understands and is agreeable to plan as discussed in clinic.  - doxycycline hyclate (VIBRAMYCIN) 100 MG capsule; Take 1 capsule (100 mg) by mouth 2 times daily for 7 days.  - fluticasone (FLONASE) 50 MCG/ACT nasal spray; Spray 1 spray into both nostrils daily.    Ruptured tympanic membrane, left  Left TM is intact slightly erythematous without bulging.  Denies otalgia and otorrhea.  Has received sufficient treatment for AOM.        Filemon Young is a 55 year old, presenting for the following health issues:  Sinus Problem and Ear Problem      3/25/2025    12:46 PM   Additional Questions   Roomed by vidhi   Accompanied by self     History of Present Illness       Reason for visit:  Fallow up still not feeling well    She eats 2-3 servings of fruits and vegetables daily.She consumes 0 sweetened beverage(s) daily.She exercises with enough effort to increase her heart rate 20 to 29 minutes per day.  She exercises with enough effort to increase her heart rate 3 or less days per week.   She is taking medications regularly.      Acute Illness  Acute illness concerns: sinus and ear pressure  Onset/Duration: 3/13  Symptoms:  Fever: No  Chills/Sweats: No  Headache (location?): slight  Sinus Pressure: YES  Conjunctivitis:  No  Ear Pain: YES: left  Rhinorrhea:  "YES  Congestion: YES  Sore Throat: comes and goes  Cough: no  Wheeze: No  Decreased Appetite: No  Nausea: No  Vomiting: No  Diarrhea: No  Dysuria/Freq.: No  Dysuria or Hematuria: No  Fatigue/Achiness: YES  Sick/Strep Exposure: YES- works at [employer]  Therapies tried and outcome: Amoxicillin- day 9- feels like going backwards    Presents with concerns of ongoing URI symptoms for the past 2 weeks.  Was seen on 3/17 for similar symptoms but at that time was diagnosed with left AOM with tympanic membrane rupture.  Treated with 10-day course of Augmentin.  Has 1 more day left of antibiotic but states that symptoms are regressing.  Sinus congestion and facial pain had improved initially while on Augmentin but represented over the past few days.  Not currently using OTC antihistamines or topical nasal corticosteroid sprays.  Had been taking oral decongestants but ran out and has not reinitiated.    Denies fever, chills, cough, otalgia, or other systemic symptoms.      Objective    /74   Pulse 55   Temp (!) 96.5  F (35.8  C) (Temporal)   Resp 16   Ht 1.617 m (5' 3.66\")   Wt 88.2 kg (194 lb 8 oz)   LMP 03/13/2025 (Exact Date)   SpO2 99%   BMI 33.74 kg/m    Body mass index is 33.74 kg/m .  Physical Exam  Vitals reviewed.   Constitutional:       General: She is not in acute distress.     Appearance: Normal appearance. She is not ill-appearing.   HENT:      Head: Normocephalic and atraumatic.      Right Ear: Tympanic membrane, ear canal and external ear normal. No decreased hearing noted. Tympanic membrane is not perforated, erythematous, retracted or bulging.      Left Ear: Ear canal and external ear normal. No decreased hearing noted. Tympanic membrane is erythematous. Tympanic membrane is not perforated, retracted or bulging.      Nose: Congestion and rhinorrhea present. Rhinorrhea is clear and purulent.      Right Turbinates: Swollen and pale.      Left Turbinates: Swollen and pale.      Right Sinus: " Maxillary sinus tenderness and frontal sinus tenderness present.      Left Sinus: Maxillary sinus tenderness and frontal sinus tenderness present.      Mouth/Throat:      Mouth: Mucous membranes are moist. No oral lesions.      Pharynx: Oropharynx is clear. No oropharyngeal exudate, posterior oropharyngeal erythema or postnasal drip.   Eyes:      Conjunctiva/sclera: Conjunctivae normal.      Right eye: Right conjunctiva is not injected. No exudate.     Left eye: Left conjunctiva is not injected. No exudate.     Pupils: Pupils are equal, round, and reactive to light.   Cardiovascular:      Rate and Rhythm: Normal rate and regular rhythm.      Heart sounds: Normal heart sounds, S1 normal and S2 normal. No murmur heard.  Pulmonary:      Effort: Pulmonary effort is normal. No respiratory distress.      Breath sounds: Normal breath sounds. No wheezing, rhonchi or rales.   Lymphadenopathy:      Cervical: No cervical adenopathy.   Skin:     General: Skin is warm and dry.      Capillary Refill: Capillary refill takes less than 2 seconds.      Findings: No lesion or rash.   Neurological:      Mental Status: She is alert.   Psychiatric:         Attention and Perception: Attention and perception normal.         Mood and Affect: Mood and affect normal.              Signed Electronically by: VEENA Bui CNP

## 2025-04-06 DIAGNOSIS — F41.9 ANXIETY: ICD-10-CM

## 2025-04-06 DIAGNOSIS — F33.1 MAJOR DEPRESSIVE DISORDER, RECURRENT EPISODE, MODERATE (H): Primary | ICD-10-CM

## 2025-04-07 NOTE — TELEPHONE ENCOUNTER
Patient Contact    Attempt # 1    Was call answered?  No.  Left message on voicemail with information to call [phone] and ask to speak with any RN on her care team regarding medication refill request.  Alisa AARON RN

## 2025-04-07 NOTE — TELEPHONE ENCOUNTER
Patient is still on medication and needs this refilled. Please refill.    Ilia Fernandez RN on 4/7/2025 at 4:41 PM

## 2025-04-08 RX ORDER — BUPROPION HYDROCHLORIDE 300 MG/1
TABLET ORAL
Qty: 90 TABLET | Refills: 0 | Status: SHIPPED | OUTPATIENT
Start: 2025-04-08

## 2025-04-12 ENCOUNTER — HEALTH MAINTENANCE LETTER (OUTPATIENT)
Age: 55
End: 2025-04-12

## 2025-04-16 NOTE — PROGRESS NOTES
Subjective     Hannah Krishna is a 49 year old female who presents to clinic today for the following health issues:    HPI   Concern - high blood pressure readings at home  Onset: 2 months    Description:   Blood pressure readings 135/92 143/92 142/82 145/97 155/109    Intensity:     Progression of Symptoms:      Accompanying Signs & Symptoms:  Chest feels heavy and heart fluttering, fatigue    Previous history of similar problem:   yes    Precipitating factors:   Worsened by: none    Alleviating factors:  Improved by: none    Therapies Tried and outcome: none    Patient states her neck surgery went well and she will be returning to work.  She states that the last couple of months she has noticed an increase in her stress level.  She states that she has been coping with her mother's increased memory issues and to having some confrontations with her.  She states she needs to talk with her brother about long-term plans.  At this point she feels her mother is able to live on her own.  Hannah notes that when she gets stressed she feels some tightness in her chest and sometimes feels a fluttering in her chest.  Sometimes when she touches her chest it is tender.  When she is relaxed the symptoms go away.  She has a wrist blood pressure monitor at home and has had some mildly elevated readings.  She has not checked her rest monitor compared to our blood pressure monitors.  She had been on metoprolol in the past but has stopped this a couple years ago as her blood pressures were looking normal.  She has been on multiple antidepressants including Effexor, Zoloft, Celexa, Prozac.  She states that she usually takes these for several years and then they seem not to work and so she tries more medication.  She is looking forward to getting back to work and hoping that this will help.  She states that she wakes up in the morning and then feels tired for several hours.  She does not have the motivation to do things in the morning.   She also has found herself very irritable and her  is also commented on this.    Patient Active Problem List   Diagnosis     Major depressive disorder, recurrent episode, moderate (H)     Anxiety     IUD (intrauterine device) in place     Fusion of spine of cervical region     Past Surgical History:   Procedure Laterality Date     ARTHROSCOPY SHOULDER BICEPS TENODESIS REPAIR Right 4/8/2016    Procedure: ARTHROSCOPY SHOULDER BICEPS TENODESIS REPAIR;  Surgeon: Gilbert Cortes DO;  Location: [facility]     ARTHROSCOPY SHOULDER DECOMPRESSION Right 4/8/2016    Procedure: ARTHROSCOPY SHOULDER DECOMPRESSION;  Surgeon: Gilbert Cortes DO;  Location: [facility]     ARTHROSCOPY SHOULDER DISTAL CLAVICLE REPAIR Right 4/8/2016    Procedure: ARTHROSCOPY SHOULDER DISTAL CLAVICLE RESECTION;  Surgeon: Gilbert Cortes DO;  Location: [facility]     ARTHROSCOPY SHOULDER, OPEN ROTATOR CUFF REPAIR, COMBINED Right 4/8/2016    Procedure: COMBINED ARTHROSCOPY SHOULDER, OPEN ROTATOR CUFF REPAIR;  Surgeon: Gilbert Cortes DO;  Location: [facility]     BUNIONECTOMY  10/24/2013    Procedure: BUNIONECTOMY;;  Surgeon: Ariel Mcdermott DPM;  Location:  OR     C LIGATE FALLOPIAN TUBE,POSTPARTUM  1998    Tubal Ligation     C NONSPECIFIC PROCEDURE  2002    kidney stones removed     C NONSPECIFIC PROCEDURE  677178    lasik surgery     C STRESS ECHO TEST NL  11/2005    neg (low probability of ischemic disease)     EXCISE EXOSTOSIS FOOT  10/24/2013    Procedure: EXCISE EXOSTOSIS FOOT;  left excision of accessory navicular, bunion repair with osteotomy, and navicular remodeling;  Surgeon: Ariel Mcdermott DPM;  Location: [facility]     EYE SURGERY       [facility] RAD RX IODINE 123 SODIUM IODIDE  [facility], UP  [facility]  10/2005    I 123 thryoid -WNL     HEAD & NECK SURGERY      Why is this one highlighted on yes already??     OSTEOTOMY FOOT  10/24/2013    Procedure: OSTEOTOMY FOOT;;  Surgeon: Ariel Mcdermott DPM;   "Location:  OR       Social History     Tobacco Use     Smoking status: Former Smoker     Packs/day: 0.10     Years: 18.00     Pack years: 1.80     Types: Cigarettes     Last attempt to quit: 2014     Years since quittin.1     Smokeless tobacco: Never Used     Tobacco comment: since    Substance Use Topics     Alcohol use: Yes     Comment: weekends <2-4 drinks     Family History   Adopted: Yes   Problem Relation Age of Onset     Unknown/Adopted Mother      Unknown/Adopted Father      Unknown/Adopted Maternal Grandmother      Unknown/Adopted Maternal Grandfather      Unknown/Adopted Paternal Grandmother      Unknown/Adopted Paternal Grandfather            Reviewed and updated as needed this visit by Provider  Tobacco  Allergies  Meds  Problems  Med Hx  Surg Hx  Fam Hx         Review of Systems   ROS COMP: CONSTITUTIONAL: NEGATIVE for fever, chills, change in weight  RESP: NEGATIVE for significant cough or SOB      Objective    /74   Pulse 70   Temp 97  F (36.1  C) (Temporal)   Resp 16   Ht 1.607 m (5' 3.25\")   Wt 98 kg (216 lb)   SpO2 98%   BMI 37.96 kg/m    Body mass index is 37.96 kg/m .  Physical Exam   Gen: no apparent distress  NECK: no adenopathy, no asymmetry, no masses  Chest: clear to auscultation without wheeze, rale or rhonchi  Cor: regular rate and rhythm without murmur  Ext: warm and dry without edema  Psych: Alert and oriented times 3; coherent speech, normal   rate and volume, able to articulate logical thoughts, able   to abstract reason, no tangential thoughts, no hallucinations   or delusions  Her affect is neutral        Assessment & Plan       ICD-10-CM    1. Anxiety F41.9 escitalopram (LEXAPRO) 20 MG tablet     sertraline (ZOLOFT) 50 MG tablet   2. Major depressive disorder, recurrent episode, moderate (H) F33.1 escitalopram (LEXAPRO) 20 MG tablet     sertraline (ZOLOFT) 50 MG tablet     I suspect her symptoms are related to anxiety.  She does not feel her chest " symptoms until she is feeling stressed.  Her blood pressure here is normal and I wonder if her blood pressure is related to stress or related to her wrist monitor.  Have asked her to bring her wrist monitor into the clinic so we can compare her readings to our readings.  We discussed counseling however she states she does not feel she has the time to do this.  We discussed increasing her bupropion but is concerned about worsening side effects.  We discussed an alternative medication than the Lexapro or adding Abilify.  After a lot of discussion we decided to stop the Lexapro for which she will take a half a tablet daily for 8 days and then stop and start sertraline.  She will follow-up in 1 month.    Return in about 4 weeks (around 2/7/2020) for anxiety.    Merlene Paz MD  [facility]     23.8

## 2025-05-03 DIAGNOSIS — F33.1 MAJOR DEPRESSIVE DISORDER, RECURRENT EPISODE, MODERATE (H): Primary | ICD-10-CM

## 2025-05-05 NOTE — TELEPHONE ENCOUNTER
Patient is taking 1 tablet daily with 300 mg tablet.    She is taking this regularly.    Geni Teran RN on 5/5/2025 at 3:42 PM

## 2025-05-05 NOTE — TELEPHONE ENCOUNTER
Clinic RN: Please investigate patient's chart or contact patient if the information cannot be found because the medication is listed as historical or discontinued. Confirm patient is taking this medication. Document findings and route refill encounter to provider for approval or denial.    Joy Conn, AMANUELN, RN

## 2025-05-05 NOTE — TELEPHONE ENCOUNTER
RN Triage    Patient Contact    Attempt # 1    RN did attempt to reach patient. No answer. Message left for patient  to call the clinic back and ask to speak to a member of the care team. Wanting to clarify below with patient.       Arnold Lozada RN on 5/5/2025 at 3:31 PM

## 2025-05-06 RX ORDER — BUPROPION HYDROCHLORIDE 150 MG/1
TABLET ORAL
Qty: 90 TABLET | Refills: 1 | Status: SHIPPED | OUTPATIENT
Start: 2025-05-06

## 2025-05-17 DIAGNOSIS — F33.1 MAJOR DEPRESSIVE DISORDER, RECURRENT EPISODE, MODERATE (H): ICD-10-CM

## 2025-05-19 RX ORDER — ESCITALOPRAM OXALATE 10 MG/1
15 TABLET ORAL DAILY
Qty: 135 TABLET | Refills: 1 | Status: SHIPPED | OUTPATIENT
Start: 2025-05-19

## 2025-05-27 DIAGNOSIS — I10 BENIGN ESSENTIAL HYPERTENSION: ICD-10-CM

## 2025-05-27 RX ORDER — METOPROLOL SUCCINATE 25 MG/1
25 TABLET, EXTENDED RELEASE ORAL DAILY
Qty: 90 TABLET | Refills: 2 | Status: SHIPPED | OUTPATIENT
Start: 2025-05-27

## 2025-06-18 ENCOUNTER — HOSPITAL ENCOUNTER (OUTPATIENT)
Dept: MRI IMAGING | Facility: CLINIC | Age: 55
Discharge: HOME | End: 2025-06-18
Attending: FAMILY MEDICINE
Payer: COMMERCIAL

## 2025-06-18 DIAGNOSIS — M54.12 CERVICAL RADICULOPATHY: ICD-10-CM

## 2025-06-18 PROCEDURE — 72141 MRI NECK SPINE W/O DYE: CPT

## 2025-06-20 ENCOUNTER — RESULTS FOLLOW-UP (OUTPATIENT)
Dept: FAMILY MEDICINE | Facility: OTHER | Age: 55
End: 2025-06-20

## 2025-06-20 DIAGNOSIS — M54.12 CERVICAL RADICULOPATHY: Primary | ICD-10-CM

## 2025-06-23 ENCOUNTER — PATIENT OUTREACH (OUTPATIENT)
Dept: CARE COORDINATION | Facility: CLINIC | Age: 55
End: 2025-06-23
Payer: COMMERCIAL

## 2025-07-06 DIAGNOSIS — F33.1 MAJOR DEPRESSIVE DISORDER, RECURRENT EPISODE, MODERATE (H): ICD-10-CM

## 2025-07-06 DIAGNOSIS — F41.9 ANXIETY: ICD-10-CM

## 2025-07-07 RX ORDER — BUPROPION HYDROCHLORIDE 300 MG/1
TABLET ORAL
Qty: 90 TABLET | Refills: 3 | Status: SHIPPED | OUTPATIENT
Start: 2025-07-07

## 2025-07-23 ENCOUNTER — THERAPY VISIT (OUTPATIENT)
Dept: PHYSICAL THERAPY | Facility: CLINIC | Age: 55
End: 2025-07-23
Attending: FAMILY MEDICINE
Payer: COMMERCIAL

## 2025-07-23 DIAGNOSIS — M54.12 CERVICAL RADICULOPATHY: ICD-10-CM

## 2025-07-23 PROCEDURE — 97110 THERAPEUTIC EXERCISES: CPT | Mod: GP | Performed by: PHYSICAL THERAPIST

## 2025-07-23 PROCEDURE — 97161 PT EVAL LOW COMPLEX 20 MIN: CPT | Mod: GP | Performed by: PHYSICAL THERAPIST

## 2025-07-23 PROCEDURE — 97530 THERAPEUTIC ACTIVITIES: CPT | Mod: GP | Performed by: PHYSICAL THERAPIST

## 2025-07-23 NOTE — PROGRESS NOTES
PHYSICAL THERAPY EVALUATION  Type of Visit: Evaluation    Patient presents with signs and symptoms consistent with C6 radiculopathy secondary to arthritis that does have some stenosis which could be causing her symptom - patient doesn't really have any neck pain symptoms. Patient demonstrates impairments including decreased cervical AROM, with poor postural awareness and + neural tension. Patient with functional limitations including waking up in the morning without P, looking down and doing daily chores without increased symptoms or difficulty. Patient would benefit from skilled PT to progress and improve impairments and concerns.       Fall Risk Screen:  Have you fallen 2 or more times in the past year?: No  Have you fallen and had an injury in the past year?: No    Subjective         Presenting condition or subjective complaint: numbness and pain in neck and left arm  Date of onset: 06/06/25    Relevant medical history: Arthritis; Depression; High blood pressure; Menopause; Migraines or headaches   Past Medical History:   Diagnosis Date    Arthritis 7/28/2015    ??    Chest pain, unspecified 11/2005    atypical - stress echo neg    Chronic depressive personality disorder     Depressive disorder     Esophageal reflux 12/11/2013    Headache 07/31/2013    Hypertension     Lateral epicondylitis 07/28/2015    Malaise and fatigue 08/01/2005    Obesity, unspecified     Other malaise and fatigue 08/2005    Polycystic ovaries     per pt    Sleep disturbance, unspecified 10/2005    see sleep study    Unspecified disorder of thyroid 09/2005    thyroid nodule. I-123 WNL.       Dates & types of surgery: rotator cuff rightand left shoulders 2016-17, neck surgery 2019  Past Surgical History:   Procedure Laterality Date    ARTHROSCOPY SHOULDER BICEPS TENODESIS REPAIR Right 04/08/2016    Procedure: ARTHROSCOPY SHOULDER BICEPS TENODESIS REPAIR;  Surgeon: Gilbert Cortes DO;  Location[facility]    ARTHROSCOPY SHOULDER  DECOMPRESSION Right 04/08/2016    Procedure: ARTHROSCOPY SHOULDER DECOMPRESSION;  Surgeon: Gilbert Cortes DO;  [facility]    ARTHROSCOPY SHOULDER DISTAL CLAVICLE REPAIR Right 04/08/2016    Procedure: ARTHROSCOPY SHOULDER DISTAL CLAVICLE RESECTION;  Surgeon: Gilbert Cortes DO;  [facility]    ARTHROSCOPY SHOULDER, OPEN ROTATOR CUFF REPAIR, COMBINED Right 04/08/2016    Procedure: COMBINED ARTHROSCOPY SHOULDER, OPEN ROTATOR CUFF REPAIR;  Surgeon: Gilbert Cortes DO;  [facility]    BUNIONECTOMY  10/24/2013    Procedure: BUNIONECTOMY;;  Surgeon: Ariel Mcdermott DPM;  [facility]    EXCISE EXOSTOSIS FOOT  10/24/2013    Procedure: EXCISE EXOSTOSIS FOOT;  left excision of accessory navicular, bunion repair with osteotomy, and navicular remodeling;  Surgeon: Ariel Mcdermott DPM;  Location[facility]    EYE SURGERY      FORAMINOTOMY THORACIC POSTERIOR MINIMALLY INVASIVE ONE LEVEL Right 03/02/2022    Procedure: and transpedicular approach for foraminotomy and discectomy;  Surgeon: Red Burnette MD;  [facility]    HC RAD RX IODINE 123 SODIUM IODIDE  UCI, UP  UCI  10/01/2005    I 123 thryoid -WNL    INJECT BLOCK MEDIAL BRANCH CERVICAL/THORACIC/LUMBAR Right 12/04/2020    Procedure: 10,11,12 Medial Branch Block Thoracic and Lumbar-1, Right;  Surgeon: González Gibbons MD;  [facility]    INJECT BLOCK MEDIAL BRANCH CERVICAL/THORACIC/LUMBAR Right 12/29/2020    Procedure: Right thoracic 10,11,12 and lumbar 1 medial branch blocks;  Surgeon: González Gibbons MD;  [facility]    INJECT EPIDURAL THORACIC N/A 10/28/2021    Procedure: Right paramedian T9-10 interlaminar epidural steroid injection using fluoroscopic guidance with contrast dye;  Surgeon: González Gibbons MD;  [facility]    INJECT EPIDURAL TRANSFORAMINAL Right 01/21/2022    Procedure: Low volume diagnostic and therapeutic right Thoracic 9 nerve root(transforaminal) Injection with  fluoroscopic guidance and contrast.;  Surgeon: González Gibbons MD;  [facility]    INJECT TRIGGER POINT N/A 10/30/2020    Procedure: thoracic epidural injection T8-9 and trigger point injections thoracic;  Surgeon: González Gibbons MD;  [facility]    LAMINECTOMY THORACIC ONE LEVEL Right 03/02/2022    Procedure: Right T9-10 laminotomies, medial facetectomy;  Surgeon: Red Burnette MD;  [facility]    NECK SURGERY  09/19/2019    ANTERIOR C5-6 DISCECTOMY AND ANTERIOR FORAMINOTOMY AND FUSION WITH INSTRUMENTATION    OSTEOTOMY FOOT  10/24/2013    Procedure: OSTEOTOMY FOOT;;  Surgeon: Ariel Mcdermott DPM;  [facility]    RADIO FREQUENCY ABLATION PULSED CERVICAL N/A 01/28/2021    Procedure: radiofrequency ablation thoracic 10, 11, 12 and lumabr 1 medial branches;  Surgeon: González Gibbons MD;  [facility]    SOFT TISSUE SURGERY      [facility] LIGATE FALLOPIAN TUBE,POSTPARTUM  01/01/1998    Tubal Ligation    Z NONSPECIFIC PROCEDURE  01/01/2002    kidney stones removed    [facility] NONSPECIFIC PROCEDURE  [id number]    lasik surgery    [facility] STRESS ECHO TEST NL  11/01/2005    neg (low probability of ischemic disease)       Prior diagnostic imaging/testing results: MRI; X-ray     Prior therapy history for the same diagnosis, illness or injury: No   - chiropractor she went to before MD, one time the pain immediately went away    Living Environment  Social support: With a significant other or spouse   Type of home: House; 2-story   Stairs to enter the home: Yes 2 Is there a railing: No     Ramp: No   Stairs inside the home: Yes 16 Is there a railing: Yes     Help at home: Self Cares (home health aide/personal care attendant, family, etc); Home management tasks (cooking, cleaning); Medication and/or finances; Home and Yard maintenance tasks  Equipment owned:       Employment: Yes  - constantly moving  Hobbies/Interests: riding [employer] boating fishing    Patient goals for  therapy: numbness to go away    Pain assessment: Pain present  See objective evaluation for additional pain details     Objective   Pain Location: L arm used to be painful and sensitive, but now it is just numbness/tingling - goes into the first 3 fingers on the LUE  Pain Quality: numbness and tingling from back of L arm into the hand/forearm  Pain Frequency: daily but occasionally and random   Pain is Worst: tingling and numbness is worst at night and wakes her up too - sleeps on her stomach with arm above head  Pain is Exacerbated By: can't pin point when it is going to come on, used to be with looking up but now comes and goes, getting it right away when she gets out of bed  Pain is Relieved By: gabapentin has helped some - she is taking 1 but MD wants her to take 3, feeling too sleepy with it   Pain Progression: gotten better since meds      CERVICAL SPINE EVALUATION    POSTURE: Standing Posture: Rounded shoulders, Forward head  Sitting Posture: Rounded shoulders, Forward head    ROM: SB L 37, SB R 37 inc symptoms, Flexion 42 inc symptoms, extension 44, Rot R 52, Rot L 48 tightness     MYOTOMES: WFL no inc of P    FLEXIBILITY: decreased UT, levators, pectorals   PALPATION: TTP at L>R UT, levators, cervical paraspinals and suboccipitals      Assessment & Plan   CLINICAL IMPRESSIONS  Medical Diagnosis: Cervical radiculopathy    Treatment Diagnosis: Cervical Radiculopathy   Impression/Assessment: Patient is a 55 year old female with LUE pain complaints.  The following significant findings have been identified: Pain, Decreased ROM/flexibility, Decreased joint mobility, and Decreased activity tolerance. These impairments interfere with their ability to perform self care tasks, work tasks, recreational activities, household chores, and driving  as compared to previous level of function.     Clinical Decision Making (Complexity):  Clinical Presentation: Stable/Uncomplicated  Clinical Presentation Rationale: based on  medical and personal factors listed in PT evaluation  Clinical Decision Making (Complexity): Low complexity    PLAN OF CARE  Treatment Interventions:  Interventions: Manual Therapy, Neuromuscular Re-education, Therapeutic Activity, Therapeutic Exercise, Self-Care/Home Management    Long Term Goals     PT Goal 1  Goal Identifier: waking up in the morning  Goal Description: Patient will be able to wake up at least 5/7 days of the week without increased arm symptoms in order to start her day without difficulty and return to normal daily living.  Goal Progress: P every day when she wakes up - sleeps on her stomach mostly with arm above head  Target Date: 09/06/25  PT Goal 2  Goal Identifier: Cervical AROM  Goal Description: Patient will be able to demonstrate full and equal cervical AROM without any increased LUE symptoms in order to return to daily function without restriction or difficulty  Goal Progress: inc symptoms with SB R and flexion at eval  Target Date: 09/21/25      Frequency of Treatment: 1x/week  Duration of Treatment: 10 weeks      Risks and benefits of evaluation/treatment have been explained.   Patient/Family/caregiver agrees with Plan of Care.     Evaluation Time:     PT Eval, Low Complexity Minutes (90563): 12     Signing Clinician: CHAYITO LIMA PT

## 2025-07-28 ENCOUNTER — THERAPY VISIT (OUTPATIENT)
Dept: PHYSICAL THERAPY | Facility: CLINIC | Age: 55
End: 2025-07-28
Attending: FAMILY MEDICINE
Payer: COMMERCIAL

## 2025-07-28 DIAGNOSIS — M54.12 CERVICAL RADICULOPATHY: Primary | ICD-10-CM

## 2025-07-28 PROCEDURE — 97110 THERAPEUTIC EXERCISES: CPT | Mod: GP | Performed by: PHYSICAL THERAPIST

## 2025-07-28 PROCEDURE — 97140 MANUAL THERAPY 1/> REGIONS: CPT | Mod: GP | Performed by: PHYSICAL THERAPIST

## 2025-08-04 ENCOUNTER — THERAPY VISIT (OUTPATIENT)
Dept: PHYSICAL THERAPY | Facility: CLINIC | Age: 55
End: 2025-08-04
Attending: FAMILY MEDICINE
Payer: COMMERCIAL

## 2025-08-04 DIAGNOSIS — M54.12 CERVICAL RADICULOPATHY: Primary | ICD-10-CM

## 2025-08-04 PROCEDURE — 97140 MANUAL THERAPY 1/> REGIONS: CPT | Mod: GP | Performed by: PHYSICAL THERAPIST

## 2025-08-04 PROCEDURE — 97110 THERAPEUTIC EXERCISES: CPT | Mod: GP | Performed by: PHYSICAL THERAPIST

## 2025-08-04 PROCEDURE — 97530 THERAPEUTIC ACTIVITIES: CPT | Mod: GP | Performed by: PHYSICAL THERAPIST

## 2025-08-05 ENCOUNTER — TELEPHONE (OUTPATIENT)
Dept: PHYSICAL MEDICINE AND REHAB | Facility: CLINIC | Age: 55
End: 2025-08-05

## 2025-08-05 ENCOUNTER — OFFICE VISIT (OUTPATIENT)
Dept: PHYSICAL MEDICINE AND REHAB | Facility: CLINIC | Age: 55
End: 2025-08-05
Payer: COMMERCIAL

## 2025-08-05 VITALS
WEIGHT: 191 LBS | HEART RATE: 72 BPM | OXYGEN SATURATION: 96 % | DIASTOLIC BLOOD PRESSURE: 73 MMHG | HEIGHT: 63 IN | BODY MASS INDEX: 33.84 KG/M2 | SYSTOLIC BLOOD PRESSURE: 132 MMHG

## 2025-08-05 DIAGNOSIS — M54.12 CERVICAL RADICULOPATHY: Primary | ICD-10-CM

## 2025-08-05 DIAGNOSIS — Z98.1 HISTORY OF FUSION OF CERVICAL SPINE: ICD-10-CM

## 2025-08-05 PROCEDURE — 3075F SYST BP GE 130 - 139MM HG: CPT | Performed by: PAIN MEDICINE

## 2025-08-05 PROCEDURE — 1125F AMNT PAIN NOTED PAIN PRSNT: CPT | Performed by: PAIN MEDICINE

## 2025-08-05 PROCEDURE — 99204 OFFICE O/P NEW MOD 45 MIN: CPT | Performed by: PAIN MEDICINE

## 2025-08-05 PROCEDURE — 3078F DIAST BP <80 MM HG: CPT | Performed by: PAIN MEDICINE

## 2025-08-05 RX ORDER — PREGABALIN 50 MG/1
50 CAPSULE ORAL 2 TIMES DAILY
Qty: 60 CAPSULE | Refills: 0 | Status: SHIPPED | OUTPATIENT
Start: 2025-08-05 | End: 2025-09-04

## 2025-08-05 ASSESSMENT — PAIN SCALES - GENERAL: PAINLEVEL_OUTOF10: MILD PAIN (3)

## 2025-08-13 ENCOUNTER — THERAPY VISIT (OUTPATIENT)
Dept: PHYSICAL THERAPY | Facility: CLINIC | Age: 55
End: 2025-08-13
Attending: FAMILY MEDICINE
Payer: COMMERCIAL

## 2025-08-13 DIAGNOSIS — M54.12 CERVICAL RADICULOPATHY: Primary | ICD-10-CM

## 2025-08-13 PROCEDURE — 97110 THERAPEUTIC EXERCISES: CPT | Mod: GP | Performed by: PHYSICAL THERAPIST

## 2025-08-13 PROCEDURE — 97140 MANUAL THERAPY 1/> REGIONS: CPT | Mod: GP | Performed by: PHYSICAL THERAPIST

## 2025-08-20 ENCOUNTER — THERAPY VISIT (OUTPATIENT)
Dept: PHYSICAL THERAPY | Facility: CLINIC | Age: 55
End: 2025-08-20
Attending: FAMILY MEDICINE
Payer: COMMERCIAL

## 2025-08-20 DIAGNOSIS — M54.12 CERVICAL RADICULOPATHY: Primary | ICD-10-CM

## 2025-08-20 PROCEDURE — 97140 MANUAL THERAPY 1/> REGIONS: CPT | Mod: GP | Performed by: PHYSICAL THERAPIST

## 2025-08-20 PROCEDURE — 97110 THERAPEUTIC EXERCISES: CPT | Mod: GP | Performed by: PHYSICAL THERAPIST

## 2025-08-27 ENCOUNTER — RADIOLOGY INJECTION OFFICE VISIT (OUTPATIENT)
Dept: PHYSICAL MEDICINE AND REHAB | Facility: CLINIC | Age: 55
End: 2025-08-27
Attending: PAIN MEDICINE
Payer: COMMERCIAL

## 2025-08-27 VITALS
OXYGEN SATURATION: 99 % | RESPIRATION RATE: 16 BRPM | SYSTOLIC BLOOD PRESSURE: 114 MMHG | DIASTOLIC BLOOD PRESSURE: 68 MMHG | TEMPERATURE: 98.2 F | HEART RATE: 57 BPM

## 2025-08-27 DIAGNOSIS — Z98.1 HISTORY OF FUSION OF CERVICAL SPINE: ICD-10-CM

## 2025-08-27 DIAGNOSIS — M54.12 CERVICAL RADICULOPATHY: ICD-10-CM

## 2025-08-27 PROCEDURE — 64479 NJX AA&/STRD TFRM EPI C/T 1: CPT | Mod: LT | Performed by: PAIN MEDICINE

## 2025-08-27 RX ORDER — DEXAMETHASONE SODIUM PHOSPHATE 10 MG/ML
INJECTION, SOLUTION INTRAMUSCULAR; INTRAVENOUS
Status: COMPLETED | OUTPATIENT
Start: 2025-08-27 | End: 2025-08-27

## 2025-08-27 RX ORDER — LIDOCAINE HYDROCHLORIDE 10 MG/ML
INJECTION, SOLUTION EPIDURAL; INFILTRATION; INTRACAUDAL; PERINEURAL
Status: COMPLETED | OUTPATIENT
Start: 2025-08-27 | End: 2025-08-27

## 2025-08-27 RX ADMIN — DEXAMETHASONE SODIUM PHOSPHATE 10 MG: 10 INJECTION, SOLUTION INTRAMUSCULAR; INTRAVENOUS at 14:58

## 2025-08-27 RX ADMIN — LIDOCAINE HYDROCHLORIDE 2 ML: 10 INJECTION, SOLUTION EPIDURAL; INFILTRATION; INTRACAUDAL; PERINEURAL at 14:58

## 2025-08-27 ASSESSMENT — PAIN SCALES - GENERAL
PAINLEVEL_OUTOF10: MODERATE PAIN (6)
PAINLEVEL_OUTOF10: MODERATE PAIN (6)

## 2025-09-03 ENCOUNTER — THERAPY VISIT (OUTPATIENT)
Dept: PHYSICAL THERAPY | Facility: CLINIC | Age: 55
End: 2025-09-03
Attending: FAMILY MEDICINE
Payer: COMMERCIAL

## 2025-09-03 DIAGNOSIS — M54.12 CERVICAL RADICULOPATHY: Primary | ICD-10-CM

## 2025-09-03 PROCEDURE — 97140 MANUAL THERAPY 1/> REGIONS: CPT | Mod: GP | Performed by: PHYSICAL THERAPIST

## 2025-09-04 ENCOUNTER — PATIENT OUTREACH (OUTPATIENT)
Dept: CARE COORDINATION | Facility: CLINIC | Age: 55
End: 2025-09-04
Payer: COMMERCIAL

## (undated) DEVICE — DRSG STERI STRIP 1/2X4" R1547

## (undated) DEVICE — NDL COUNTER 20CT 31142493

## (undated) DEVICE — TUBING EXTENSION SET MICROBORE 21CM LL 6N8374

## (undated) DEVICE — ESU CANNULA VENOM RF 18GA 10X150MM LF 0406-860-225

## (undated) DEVICE — SYR 10ML LL W/O NDL

## (undated) DEVICE — Device

## (undated) DEVICE — SU VICRYL 2-0 CT-2 CR 8X18" J726D

## (undated) DEVICE — SYR 05ML LL W/O NDL

## (undated) DEVICE — NDL SPINAL 25GA 3.5" QUINCKE 405180

## (undated) DEVICE — NDL SPINAL 18GA 3.5" 405184

## (undated) DEVICE — GLOVE PROTEXIS W/NEU-THERA 7.0  2D73TE70

## (undated) DEVICE — SU VICRYL 0 CT-2 CR 8X18" J727D

## (undated) DEVICE — SYR 03ML LL W/O NDL

## (undated) DEVICE — DRAPE SHEET REV FOLD 3/4 9349

## (undated) DEVICE — NDL ANGIOCATH 14GA 1.25" 3068

## (undated) DEVICE — NDL SPINAL 20GA 3.5" 405182

## (undated) DEVICE — GLOVE PROTEXIS W/NEU-THERA 7.5  2D73TE75

## (undated) DEVICE — ADH FLOSEAL W/HUMAN THROMBIN 5ML 1501825

## (undated) DEVICE — NDL ECLIPSE 18GA 1.5"

## (undated) DEVICE — DRAPE MAYO STAND 23X54 8337

## (undated) DEVICE — TRAY PROCEDURE SUPPORT PAIN MANAGEMENT 332114

## (undated) DEVICE — PREP CHLORAPREP 26ML TINTED ORANGE  260815

## (undated) DEVICE — DRAPE STERI TOWEL SM 1000

## (undated) DEVICE — PREP CHLORAPREP ORANGE 3ML  260415

## (undated) DEVICE — SOL WATER IRRIG 1000ML BOTTLE 07139-09

## (undated) DEVICE — SYR 20ML LL W/O NDL

## (undated) DEVICE — GLOVE ESTEEM BLUE W/NEU-THERA 8.0  2D73PB80

## (undated) DEVICE — ESU ELEC BLADE 2.75" COATED/INSULATED E1455

## (undated) DEVICE — TRAY SINGLE DOSE EPIDURAL ANESTHESIA

## (undated) DEVICE — DRAPE C-ARM

## (undated) DEVICE — GLOVE ESTEEM POWDER FREE 8.5  2D72PL85

## (undated) DEVICE — SYR BULB IRRIG DOVER 60 ML LATEX FREE 67000

## (undated) DEVICE — TUBING IV EXTENSION SET 34"

## (undated) DEVICE — GLOVE ESTEEM BLUE W/NEU-THERA 8.5  2D73PB85

## (undated) DEVICE — SU VICRYL 0 CT-1 CR 8X27" JJ31G

## (undated) DEVICE — DRAPE MICRO ZEISS OPMI 137X381CM 306070-0000-000

## (undated) DEVICE — GOWN XXL 9575

## (undated) DEVICE — BONE WAX 2.5GM W31G

## (undated) DEVICE — GLOVE ESTEEM POWDER FREE 7.5  2D72PL75

## (undated) DEVICE — DRAPE POUCH INSTRUMENT 1018

## (undated) DEVICE — TUBING SUCTION 12"X1/4" N612

## (undated) DEVICE — DRSG GAUZE 4X4" 2187

## (undated) DEVICE — ADH LIQUID MASTISOL TOPICAL VIAL 2-3ML 0523-48

## (undated) DEVICE — SU MONOCRYL 4-0 PS-2 18" UND Y496G

## (undated) DEVICE — ESU ELEC BLADE 6" COATED E1450-6

## (undated) RX ORDER — METHYLPREDNISOLONE ACETATE 40 MG/ML
INJECTION, SUSPENSION INTRA-ARTICULAR; INTRALESIONAL; INTRAMUSCULAR; SOFT TISSUE
Status: DISPENSED
Start: 2022-03-02

## (undated) RX ORDER — LIDOCAINE HYDROCHLORIDE 20 MG/ML
INJECTION, SOLUTION EPIDURAL; INFILTRATION; INTRACAUDAL; PERINEURAL
Status: DISPENSED
Start: 2022-03-02

## (undated) RX ORDER — FENTANYL CITRATE 50 UG/ML
INJECTION, SOLUTION INTRAMUSCULAR; INTRAVENOUS
Status: DISPENSED
Start: 2021-01-28

## (undated) RX ORDER — LIDOCAINE HYDROCHLORIDE 10 MG/ML
INJECTION, SOLUTION INFILTRATION; PERINEURAL
Status: DISPENSED
Start: 2022-03-02

## (undated) RX ORDER — FENTANYL CITRATE 50 UG/ML
INJECTION, SOLUTION INTRAMUSCULAR; INTRAVENOUS
Status: DISPENSED
Start: 2022-03-02

## (undated) RX ORDER — EPINEPHRINE 1 MG/ML
INJECTION, SOLUTION INTRAMUSCULAR; SUBCUTANEOUS
Status: DISPENSED
Start: 2022-03-02

## (undated) RX ORDER — EPHEDRINE SULFATE 50 MG/ML
INJECTION, SOLUTION INTRAMUSCULAR; INTRAVENOUS; SUBCUTANEOUS
Status: DISPENSED
Start: 2022-03-02

## (undated) RX ORDER — PROPOFOL 10 MG/ML
INJECTION, EMULSION INTRAVENOUS
Status: DISPENSED
Start: 2022-03-02

## (undated) RX ORDER — FENTANYL CITRATE 50 UG/ML
INJECTION, SOLUTION INTRAMUSCULAR; INTRAVENOUS
Status: DISPENSED
Start: 2021-10-28